# Patient Record
Sex: FEMALE | Race: WHITE | NOT HISPANIC OR LATINO | Employment: FULL TIME | URBAN - METROPOLITAN AREA
[De-identification: names, ages, dates, MRNs, and addresses within clinical notes are randomized per-mention and may not be internally consistent; named-entity substitution may affect disease eponyms.]

---

## 2017-01-30 DIAGNOSIS — I10 ESSENTIAL HYPERTENSION: ICD-10-CM

## 2017-01-30 RX ORDER — HYDROCHLOROTHIAZIDE 25 MG/1
25 TABLET ORAL DAILY
Qty: 90 TABLET | Refills: 1 | Status: SHIPPED | OUTPATIENT
Start: 2017-01-30 | End: 2017-03-06 | Stop reason: SDUPTHER

## 2017-01-30 RX ORDER — RAMIPRIL 10 MG/1
CAPSULE ORAL
Qty: 90 CAPSULE | Refills: 1 | Status: SHIPPED | OUTPATIENT
Start: 2017-01-30 | End: 2017-08-18 | Stop reason: SDUPTHER

## 2017-01-31 RX ORDER — LEVONORGESTREL AND ETHINYL ESTRADIOL 0.15-0.03
KIT ORAL
Qty: 28 TABLET | Refills: 1 | Status: SHIPPED | OUTPATIENT
Start: 2017-01-31 | End: 2018-05-17

## 2017-02-24 DIAGNOSIS — I10 ESSENTIAL HYPERTENSION: ICD-10-CM

## 2017-02-24 RX ORDER — HYDROCHLOROTHIAZIDE 25 MG/1
TABLET ORAL
Qty: 90 TABLET | Refills: 0 | OUTPATIENT
Start: 2017-02-24

## 2017-03-05 DIAGNOSIS — I10 ESSENTIAL HYPERTENSION: ICD-10-CM

## 2017-03-06 ENCOUNTER — OFFICE VISIT (OUTPATIENT)
Dept: INTERNAL MEDICINE | Age: 52
End: 2017-03-06

## 2017-03-06 VITALS
OXYGEN SATURATION: 99 % | TEMPERATURE: 98.7 F | SYSTOLIC BLOOD PRESSURE: 142 MMHG | WEIGHT: 186 LBS | BODY MASS INDEX: 30.99 KG/M2 | DIASTOLIC BLOOD PRESSURE: 90 MMHG | HEIGHT: 65 IN | HEART RATE: 118 BPM

## 2017-03-06 DIAGNOSIS — S96.912A LEFT ANKLE STRAIN, INITIAL ENCOUNTER: Primary | ICD-10-CM

## 2017-03-06 DIAGNOSIS — I10 ESSENTIAL HYPERTENSION: ICD-10-CM

## 2017-03-06 PROCEDURE — 99213 OFFICE O/P EST LOW 20 MIN: CPT | Performed by: NURSE PRACTITIONER

## 2017-03-06 RX ORDER — HYDROCHLOROTHIAZIDE 25 MG/1
TABLET ORAL
Qty: 90 TABLET | Refills: 0 | Status: SHIPPED | OUTPATIENT
Start: 2017-03-06 | End: 2017-07-25 | Stop reason: SDUPTHER

## 2017-03-06 RX ORDER — HYDROCHLOROTHIAZIDE 25 MG/1
25 TABLET ORAL DAILY
Qty: 90 TABLET | Refills: 1 | Status: SHIPPED | OUTPATIENT
Start: 2017-03-06 | End: 2017-07-25 | Stop reason: SDUPTHER

## 2017-03-06 NOTE — PROGRESS NOTES
Niesha Bui / 51 y.o. / female  03/06/2017      CC:  Main reason(s) for today's visit: Joint Swelling (left ankle swelling / sore )      HPI:   Patient presents to the office with complaints of left ankle swelling.  Patient states that she has been exercising a lot more recently and has been walking approximately 5 miles a day.  Patient states she can bear weight on the foot but does have pain in the outer portion near the left ankle.    The following portions of the patient's history were reviewed and updated as appropriate: past medical history and past surgical history.    ASSESSMENT & PLAN:    Problem List Items Addressed This Visit        Cardiovascular and Mediastinum    Essential hypertension    Relevant Medications    ramipril (ALTACE) 10 MG capsule    hydrochlorothiazide (HYDRODIURIL) 25 MG tablet      Other Visit Diagnoses     Left ankle strain, initial encounter    -  Primary        No orders of the defined types were placed in this encounter.      · Summary/Discussion:     1.  Left ankle strain: Patient with C/O pain, swelling and tenderness in the left ankle.  Upon physical inspection patient does have mild swelling at the left foot.  No redness, heat noted.  Patient does not have any pain upon flexion and extension of the left foot.  Patient was advised to continue ibuprofen to help with the inflammation, she was also advised to elevate and ice her left foot after activities.  Patient was instructed that for periods of activity she can wear a soft ankle brace to have additional support.  She was instructed to monitor for worsening of symptoms and if symptoms do not improve to contact the office for further evaluation.    2.  Essential hypertension: Patient with history of essential hypertension, reports to the office requesting medication refill.  I reviewed progress notes from previous visit as well as labs and medication.  Patient medication for HCTZ will be reordered at today's visit.  Follow-up:  "No Follow-up on file.     Future Appointments  Date Time Provider Department Center   3/21/2017 8:00 AM Niranjan Edwards MD [facility] None     ____________________________________________________________________    REVIEW OF SYSTEMS    Review of Systems   Musculoskeletal:        Left ankle pain and swelling.   All other systems reviewed and are negative.    Other: As noted per HPI      VITALS    Visit Vitals   • /90   • Pulse 118   • Temp 98.7 °F (37.1 °C)   • Ht 65\" (165.1 cm)   • Wt 186 lb (84.4 kg)   • SpO2 99%   • BMI 30.95 kg/m2     BP Readings from Last 3 Encounters:   03/06/17 142/90   11/23/16 160/99   10/21/16 140/80     Wt Readings from Last 3 Encounters:   03/06/17 186 lb (84.4 kg)   11/23/16 185 lb 6.4 oz (84.1 kg)   10/21/16 185 lb 12.8 oz (84.3 kg)      Body mass index is 30.95 kg/(m^2).    PHYSICAL EXAMINATION    Physical Exam   Constitutional: She is oriented to person, place, and time. She appears well-developed and well-nourished.   HENT:   Head: Normocephalic.   Eyes: Conjunctivae are normal. Pupils are equal, round, and reactive to light.   Neck: Normal range of motion.   Cardiovascular: Normal rate and regular rhythm.    Pulmonary/Chest: Effort normal and breath sounds normal.   Musculoskeletal: Normal range of motion. She exhibits edema and tenderness (Left ankle. ).   Patient has full, active range of motion.  Patient does have mild swelling on her left foot.  No bruising, redness, heat noted.  Patient did not have any difficulty with flexion and extension of her left foot.  Patient does have mild tenderness upon palpation of the left ankle.   Neurological: She is alert and oriented to person, place, and time.   Skin: Skin is warm and dry.   Psychiatric: She has a normal mood and affect. Her behavior is normal.   Vitals reviewed.      REVIEWED DATA:    Labs:   Lab Results   Component Value Date     08/09/2016    K 4.3 08/09/2016    AST 28 08/09/2016    ALT 21 08/09/2016    BUN 16 " 08/09/2016    CREATININE 0.77 08/09/2016    CREATININE 0.85 04/30/2015    CREATININE 0.74 05/16/2014    EGFRIFNONA 79 08/09/2016    EGFRIFAFRI 96 08/09/2016       Lab Results   Component Value Date    GLU 95 08/09/2016    GLU 92 04/30/2015       Lab Results   Component Value Date    LDL 77 08/09/2016    LDL 92 04/30/2015     (H) 05/16/2014    HDL 88 (H) 08/09/2016    TRIG 107 08/09/2016       No results found for: TSH, FREET4       Lab Results   Component Value Date    WBC 4.5 04/30/2015    HGB 14.5 04/30/2015     04/30/2015        Imaging:        Medical Tests:        Summary of old records / correspondence / consultant report:        Request outside records:          ALLERGIES:    Sulfa antibiotics    MEDICATIONS:  Current Outpatient Prescriptions   Medication Sig Dispense Refill   • hydrochlorothiazide (HYDRODIURIL) 25 MG tablet Take 1 tablet by mouth Daily. 90 tablet 1   • MARLISSA 0.15-30 MG-MCG per tablet TAKE 1 TABLET DAILY 28 tablet 1   • pravastatin (PRAVACHOL) 40 MG tablet Take 1 tablet by mouth Every Night. 90 tablet 1   • ramipril (ALTACE) 10 MG capsule TAKE 1 CAPSULE DAILY 90 capsule 1     No current facility-administered medications for this visit.        [facility]    The following portions of the patient's history were reviewed and updated as appropriate: Allergies / Current Medications / Past Medical History / Surgical History / Social History / Family History    PROBLEM LIST:    Patient Active Problem List   Diagnosis   • Essential hypertension   • Hyperlipidemia   • Obesity (BMI 30-39.9)   • Routine health maintenance       PAST MEDICAL HX:    Past Medical History   Diagnosis Date   • Essential hypertension    • Hyperlipidemia    • UTI (urinary tract infection)        PAST SURGICAL HX:    History reviewed. No pertinent past surgical history.    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

## 2017-05-18 DIAGNOSIS — E78.5 HYPERLIPIDEMIA, UNSPECIFIED HYPERLIPIDEMIA TYPE: ICD-10-CM

## 2017-05-18 RX ORDER — PRAVASTATIN SODIUM 40 MG
TABLET ORAL
Qty: 90 TABLET | Refills: 1 | Status: SHIPPED | OUTPATIENT
Start: 2017-05-18 | End: 2017-11-13 | Stop reason: SDUPTHER

## 2017-07-25 DIAGNOSIS — I10 ESSENTIAL HYPERTENSION: ICD-10-CM

## 2017-07-25 RX ORDER — HYDROCHLOROTHIAZIDE 25 MG/1
25 TABLET ORAL DAILY
Qty: 90 TABLET | Refills: 1 | Status: SHIPPED | OUTPATIENT
Start: 2017-07-25 | End: 2017-11-13 | Stop reason: SDUPTHER

## 2017-08-18 RX ORDER — RAMIPRIL 10 MG/1
CAPSULE ORAL
Qty: 90 CAPSULE | Refills: 1 | Status: SHIPPED | OUTPATIENT
Start: 2017-08-18 | End: 2017-11-13 | Stop reason: SDUPTHER

## 2017-11-13 ENCOUNTER — OFFICE VISIT (OUTPATIENT)
Dept: INTERNAL MEDICINE | Age: 52
End: 2017-11-13

## 2017-11-13 VITALS
DIASTOLIC BLOOD PRESSURE: 80 MMHG | HEART RATE: 120 BPM | BODY MASS INDEX: 28.99 KG/M2 | HEIGHT: 65 IN | TEMPERATURE: 98.5 F | WEIGHT: 174 LBS | SYSTOLIC BLOOD PRESSURE: 132 MMHG | OXYGEN SATURATION: 100 %

## 2017-11-13 DIAGNOSIS — Z00.00 ROUTINE HEALTH MAINTENANCE: Primary | ICD-10-CM

## 2017-11-13 DIAGNOSIS — I10 ESSENTIAL HYPERTENSION: ICD-10-CM

## 2017-11-13 DIAGNOSIS — E66.9 OBESITY (BMI 30-39.9): ICD-10-CM

## 2017-11-13 DIAGNOSIS — E78.5 HYPERLIPIDEMIA, UNSPECIFIED HYPERLIPIDEMIA TYPE: ICD-10-CM

## 2017-11-13 PROCEDURE — 99396 PREV VISIT EST AGE 40-64: CPT | Performed by: INTERNAL MEDICINE

## 2017-11-13 RX ORDER — PRAVASTATIN SODIUM 40 MG
40 TABLET ORAL DAILY
Qty: 90 TABLET | Refills: 3 | Status: SHIPPED | OUTPATIENT
Start: 2017-11-13 | End: 2018-10-18 | Stop reason: SDUPTHER

## 2017-11-13 RX ORDER — PRAVASTATIN SODIUM 40 MG
TABLET ORAL
Qty: 90 TABLET | Refills: 1 | Status: CANCELLED | OUTPATIENT
Start: 2017-11-13

## 2017-11-13 RX ORDER — HYDROCHLOROTHIAZIDE 25 MG/1
25 TABLET ORAL DAILY
Qty: 90 TABLET | Refills: 3 | Status: SHIPPED | OUTPATIENT
Start: 2017-11-13 | End: 2018-02-06 | Stop reason: SDUPTHER

## 2017-11-13 RX ORDER — RAMIPRIL 10 MG/1
10 CAPSULE ORAL DAILY
Qty: 90 CAPSULE | Refills: 3 | Status: SHIPPED | OUTPATIENT
Start: 2017-11-13 | End: 2018-02-06 | Stop reason: SDUPTHER

## 2017-11-13 RX ORDER — DIAPER,BRIEF,INFANT-TODD,DISP
EACH MISCELLANEOUS
COMMUNITY

## 2017-11-13 RX ORDER — FAMOTIDINE 20 MG
TABLET ORAL
COMMUNITY

## 2017-11-13 NOTE — PROGRESS NOTES
"      Niesha Bui / 52 y.o. / female  11/13/2017  VITALS    /80  Pulse 120  Temp 98.5 °F (36.9 °C)  Ht 65\" (165.1 cm)  Wt 174 lb (78.9 kg)  SpO2 100%  Breastfeeding? No  BMI 28.96 kg/m2    BP Readings from Last 3 Encounters:   11/13/17 132/80   03/06/17 142/90   11/23/16 160/99     Wt Readings from Last 3 Encounters:   11/13/17 174 lb (78.9 kg)   03/06/17 186 lb (84.4 kg)   11/23/16 185 lb 6.4 oz (84.1 kg)      Body mass index is 28.96 kg/(m^2).    CC:  Main reason(s) for today's visit: Annual Exam      HPI:     She presents today for annual physical.    Health maintenance: Ophthalmology August 2017, dentist May 2017.  Exercise daily walking and steps at home.  She has lost 11 pounds since last November.  Her goal weight is 155 pounds, I concur.    Patient Care Team:  Niranjan Edwards MD as PCP - General  ____________________________________________________________________    ASSESSMENT & PLAN:    Problem List Items Addressed This Visit        Unprioritized    Essential hypertension    Relevant Medications    ramipril (ALTACE) 10 MG capsule    hydrochlorothiazide (HYDRODIURIL) 25 MG tablet    Hyperlipidemia    Relevant Medications    pravastatin (PRAVACHOL) 40 MG tablet    Obesity (BMI 30-39.9)    Routine health maintenance - Primary    Relevant Orders    CBC & Differential    Comprehensive Metabolic Panel    Lipid Panel    Urinalysis With / Microscopic If Indicated - Urine, Clean Catch    Vitamin D 25 Hydroxy        Orders Placed This Encounter   Procedures   • Comprehensive Metabolic Panel   • Lipid Panel   • Urinalysis With / Microscopic If Indicated - Urine, Clean Catch   • Vitamin D 25 Hydroxy       Summary/Discussion:  · Number-one routine health maintenance, clinically stable female.  See comments above and below.  Lab as above, follow-up results online.  Recommend repeat exam one year.  Refill all meds ×1 year.  ·   · #2 hypertension stable on current medication.  Plan: Continue meds, blood " pressure check 4 months.  ·   · #3 weight control: Patient is actively working on weight loss and is doing good job over the past year.  Encourage her to continue same.  I agree with her goal weight loss target.  Right    Return in about 4 months (around 3/13/2018) for Blood pressure check.    No future appointments.    ______________________________________________________    REVIEW OF SYSTEMS    Review of Systems   Constitutional: Negative.    HENT: Negative.    Eyes: Negative.    Respiratory: Negative.    Cardiovascular: Negative.    Gastrointestinal: Negative.    Endocrine: Negative.    Genitourinary: Negative.    Musculoskeletal: Negative.    Skin: Negative.    Neurological: Negative.    Hematological: Negative.    Psychiatric/Behavioral: Negative.          PHYSICAL EXAMINATION    Physical Exam   Constitutional: She is oriented to person, place, and time. She appears well-developed. No distress.   o/w   HENT:   Head: Normocephalic and atraumatic.   Right Ear: Tympanic membrane, external ear and ear canal normal.   Left Ear: Tympanic membrane, external ear and ear canal normal.   Mouth/Throat: Uvula is midline, oropharynx is clear and moist and mucous membranes are normal.   Eyes: Conjunctivae and EOM are normal. Pupils are equal, round, and reactive to light.   Neck: Normal range of motion. Neck supple. No JVD present. Carotid bruit is not present. No thyromegaly present.   Cardiovascular: Normal rate, regular rhythm, normal heart sounds and intact distal pulses.  Exam reveals no gallop and no friction rub.    No murmur heard.  Pulmonary/Chest: Effort normal and breath sounds normal. She has no wheezes. She has no rales.   Abdominal: Soft. Bowel sounds are normal. There is no hepatosplenomegaly. There is no tenderness.   Genitourinary:   Genitourinary Comments: Patient has follow-up with gynecology schedule for later this month.   Musculoskeletal: Normal range of motion. She exhibits no edema or deformity.    Lymphadenopathy:     She has no cervical adenopathy.   Neurological: She is alert and oriented to person, place, and time. She has normal strength and normal reflexes. No cranial nerve deficit or sensory deficit. Coordination normal.   Skin: Skin is warm and dry. No rash noted.   Psychiatric: She has a normal mood and affect. Her speech is normal and behavior is normal. Judgment and thought content normal. Cognition and memory are normal.   Nursing note and vitals reviewed.      REVIEWED DATA:    Labs:     Lab Results   Component Value Date     08/09/2016    K 4.3 08/09/2016    AST 28 08/09/2016    ALT 21 08/09/2016    BUN 16 08/09/2016    CREATININE 0.77 08/09/2016    CREATININE 0.85 04/30/2015    CREATININE 0.74 05/16/2014    EGFRIFNONA 79 08/09/2016    EGFRIFAFRI 96 08/09/2016       Lab Results   Component Value Date    GLU 95 08/09/2016    GLU 92 04/30/2015       Lab Results   Component Value Date    LDL 77 08/09/2016    LDL 92 04/30/2015     (H) 05/16/2014    HDL 88 (H) 08/09/2016    TRIG 107 08/09/2016       No results found for: TSH, FREET4    Lab Results   Component Value Date    WBC 4.5 04/30/2015    HGB 14.5 04/30/2015     04/30/2015       Imaging:         Medical Tests:         Summary of old records / correspondence / consultant report:         Request outside records:         ALLERGIES  Allergies   Allergen Reactions   • Sulfa Antibiotics         MEDICATIONS  Current Outpatient Prescriptions   Medication Sig Dispense Refill   • aspirin 81 MG tablet Take 81 mg by mouth.     • hydrochlorothiazide (HYDRODIURIL) 25 MG tablet Take 1 tablet by mouth Daily. 90 tablet 3   • MARLISSA 0.15-30 MG-MCG per tablet TAKE 1 TABLET DAILY 28 tablet 1   • Multiple Minerals-Vitamins (CALCIUM CITRATE PLUS/MAGNESIUM) tablet Take  by mouth.     • pravastatin (PRAVACHOL) 40 MG tablet Take 1 tablet by mouth Daily. 90 tablet 3   • ramipril (ALTACE) 10 MG capsule Take 1 capsule by mouth Daily. 90 capsule 3    • Vitamin D, Cholecalciferol, 1000 units capsule Take  by mouth.       No current facility-administered medications for this visit.        PFSH:     The following portions of the patient's history were reviewed and updated as appropriate: Allergies / Current Medications / Past Medical History / Surgical History / Social History / Family History    PROBLEM LIST   Patient Active Problem List   Diagnosis   • Essential hypertension   • Hyperlipidemia   • Obesity (BMI 30-39.9)   • Routine health maintenance       PAST MEDICAL HISTORY  Past Medical History:   Diagnosis Date   • Essential hypertension    • Hyperlipidemia    • UTI (urinary tract infection)        SURGICAL HISTORY  History reviewed. No pertinent surgical history.    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 The electronic translation of spoken language may permit erroneous, or at times, nonsensical words or phrases to be inadvertently transcribed. Although I have reviewed the note for such errors, some may still exist.

## 2017-11-14 PROBLEM — R73.09 ELEVATED GLUCOSE: Status: ACTIVE | Noted: 2017-11-14

## 2017-11-14 LAB
25(OH)D3+25(OH)D2 SERPL-MCNC: 84.6 NG/ML (ref 30–100)
ALBUMIN SERPL-MCNC: 4.4 G/DL (ref 3.5–5.2)
ALBUMIN/GLOB SERPL: 1.5 G/DL
ALP SERPL-CCNC: 51 U/L (ref 39–117)
ALT SERPL-CCNC: 16 U/L (ref 1–33)
APPEARANCE UR: CLEAR
AST SERPL-CCNC: 21 U/L (ref 1–32)
BASOPHILS # BLD AUTO: 0.02 10*3/MM3 (ref 0–0.2)
BASOPHILS NFR BLD AUTO: 0.4 % (ref 0–1.5)
BILIRUB SERPL-MCNC: 0.3 MG/DL (ref 0.1–1.2)
BILIRUB UR QL STRIP: NEGATIVE
BUN SERPL-MCNC: 17 MG/DL (ref 6–20)
BUN/CREAT SERPL: 18.9 (ref 7–25)
CALCIUM SERPL-MCNC: 9.5 MG/DL (ref 8.6–10.5)
CHLORIDE SERPL-SCNC: 101 MMOL/L (ref 98–107)
CHOLEST SERPL-MCNC: 195 MG/DL (ref 0–200)
CO2 SERPL-SCNC: 24.9 MMOL/L (ref 22–29)
COLOR UR: YELLOW
CREAT SERPL-MCNC: 0.9 MG/DL (ref 0.57–1)
EOSINOPHIL # BLD AUTO: 0.11 10*3/MM3 (ref 0–0.7)
EOSINOPHIL NFR BLD AUTO: 2.1 % (ref 0.3–6.2)
ERYTHROCYTE [DISTWIDTH] IN BLOOD BY AUTOMATED COUNT: 14.2 % (ref 11.7–13)
GFR SERPLBLD CREATININE-BSD FMLA CKD-EPI: 66 ML/MIN/1.73
GFR SERPLBLD CREATININE-BSD FMLA CKD-EPI: 80 ML/MIN/1.73
GLOBULIN SER CALC-MCNC: 3 GM/DL
GLUCOSE SERPL-MCNC: 114 MG/DL (ref 65–99)
GLUCOSE UR QL: NEGATIVE
HCT VFR BLD AUTO: 45.9 % (ref 35.6–45.5)
HDLC SERPL-MCNC: 83 MG/DL (ref 40–60)
HGB BLD-MCNC: 14.8 G/DL (ref 11.9–15.5)
HGB UR QL STRIP: NEGATIVE
IMM GRANULOCYTES # BLD: 0 10*3/MM3 (ref 0–0.03)
IMM GRANULOCYTES NFR BLD: 0 % (ref 0–0.5)
KETONES UR QL STRIP: NEGATIVE
LDLC SERPL CALC-MCNC: 93 MG/DL (ref 0–100)
LEUKOCYTE ESTERASE UR QL STRIP: NEGATIVE
LYMPHOCYTES # BLD AUTO: 1.29 10*3/MM3 (ref 0.9–4.8)
LYMPHOCYTES NFR BLD AUTO: 24.7 % (ref 19.6–45.3)
MCH RBC QN AUTO: 32.1 PG (ref 26.9–32)
MCHC RBC AUTO-ENTMCNC: 32.2 G/DL (ref 32.4–36.3)
MCV RBC AUTO: 99.6 FL (ref 80.5–98.2)
MONOCYTES # BLD AUTO: 0.28 10*3/MM3 (ref 0.2–1.2)
MONOCYTES NFR BLD AUTO: 5.4 % (ref 5–12)
NEUTROPHILS # BLD AUTO: 3.53 10*3/MM3 (ref 1.9–8.1)
NEUTROPHILS NFR BLD AUTO: 67.4 % (ref 42.7–76)
NITRITE UR QL STRIP: NEGATIVE
PH UR STRIP: 7 [PH] (ref 5–8)
PLATELET # BLD AUTO: 312 10*3/MM3 (ref 140–500)
POTASSIUM SERPL-SCNC: 4.6 MMOL/L (ref 3.5–5.2)
PROT SERPL-MCNC: 7.4 G/DL (ref 6–8.5)
PROT UR QL STRIP: NEGATIVE
RBC # BLD AUTO: 4.61 10*6/MM3 (ref 3.9–5.2)
SODIUM SERPL-SCNC: 141 MMOL/L (ref 136–145)
SP GR UR: 1.02 (ref 1–1.03)
TRIGL SERPL-MCNC: 95 MG/DL (ref 0–150)
UROBILINOGEN UR STRIP-MCNC: NORMAL MG/DL
VLDLC SERPL CALC-MCNC: 19 MG/DL (ref 5–40)
WBC # BLD AUTO: 5.23 10*3/MM3 (ref 4.5–10.7)

## 2017-11-14 NOTE — PROGRESS NOTES
All laboratory values are acceptable with the exception of glucose 114 mg percent.  For your reference, the cut off for diabetes is a value greater than 126 mg percent on 2 separate occasions.  Weight loss is the treatment of choice for this issue, I recommend that you continue your excellent efforts.  We will recheck this glucose value at your next blood pressure appointment, please come fasting.

## 2018-02-05 ENCOUNTER — TELEPHONE (OUTPATIENT)
Dept: OBSTETRICS AND GYNECOLOGY | Facility: CLINIC | Age: 53
End: 2018-02-05

## 2018-02-05 NOTE — TELEPHONE ENCOUNTER
Dakota.    Let her know that we refilled her birth control pills but she will need to be seen to get more.  Please schedule.    Thanks    Jesse

## 2018-02-06 DIAGNOSIS — I10 ESSENTIAL HYPERTENSION: ICD-10-CM

## 2018-02-06 RX ORDER — RAMIPRIL 10 MG/1
10 CAPSULE ORAL DAILY
Qty: 90 CAPSULE | Refills: 3 | Status: SHIPPED | OUTPATIENT
Start: 2018-02-06 | End: 2018-03-13 | Stop reason: SDUPTHER

## 2018-02-06 RX ORDER — HYDROCHLOROTHIAZIDE 25 MG/1
25 TABLET ORAL DAILY
Qty: 90 TABLET | Refills: 3 | Status: SHIPPED | OUTPATIENT
Start: 2018-02-06 | End: 2018-12-27 | Stop reason: SDUPTHER

## 2018-03-13 ENCOUNTER — OFFICE VISIT (OUTPATIENT)
Dept: INTERNAL MEDICINE | Age: 53
End: 2018-03-13

## 2018-03-13 VITALS
DIASTOLIC BLOOD PRESSURE: 104 MMHG | TEMPERATURE: 97.9 F | SYSTOLIC BLOOD PRESSURE: 162 MMHG | WEIGHT: 177 LBS | OXYGEN SATURATION: 100 % | HEART RATE: 102 BPM | HEIGHT: 65 IN | BODY MASS INDEX: 29.49 KG/M2

## 2018-03-13 DIAGNOSIS — E78.5 HYPERLIPIDEMIA, UNSPECIFIED HYPERLIPIDEMIA TYPE: ICD-10-CM

## 2018-03-13 DIAGNOSIS — I10 ESSENTIAL HYPERTENSION: Primary | ICD-10-CM

## 2018-03-13 DIAGNOSIS — R73.09 ELEVATED GLUCOSE: ICD-10-CM

## 2018-03-13 DIAGNOSIS — E66.9 OBESITY (BMI 30-39.9): ICD-10-CM

## 2018-03-13 LAB — GLUCOSE SERPL-MCNC: 103 MG/DL (ref 65–99)

## 2018-03-13 PROCEDURE — 99214 OFFICE O/P EST MOD 30 MIN: CPT | Performed by: INTERNAL MEDICINE

## 2018-03-13 RX ORDER — RAMIPRIL 10 MG/1
20 CAPSULE ORAL DAILY
Qty: 180 CAPSULE | Refills: 3 | Status: SHIPPED | OUTPATIENT
Start: 2018-03-13 | End: 2018-12-26 | Stop reason: SDUPTHER

## 2018-03-13 RX ORDER — RAMIPRIL 10 MG/1
20 CAPSULE ORAL DAILY
Qty: 90 CAPSULE | Refills: 3
Start: 2018-03-13 | End: 2018-03-13 | Stop reason: SDUPTHER

## 2018-03-13 NOTE — PROGRESS NOTES
"    Niesha Bui / 52 y.o. / female  03/13/2018  VITALS    Visit Vitals  BP (!) 162/104   Pulse 102   Temp 97.9 °F (36.6 °C)   Ht 165.1 cm (65\")   Wt 80.3 kg (177 lb)   SpO2 100%   BMI 29.45 kg/m²       BP Readings from Last 3 Encounters:   03/13/18 (!) 162/104   11/13/17 132/80   03/06/17 142/90     Wt Readings from Last 3 Encounters:   03/13/18 80.3 kg (177 lb)   11/13/17 78.9 kg (174 lb)   03/06/17 84.4 kg (186 lb)      Body mass index is 29.45 kg/m².    CC:  Main reason(s) for today's visit: Hypertension      HPI:     Day for follow-up of hypertension.  When last seen by me, blood pressure is well-controlled 132/80.  Today she is compliant with medication, dietary salt restriction, walking 2 miles for exercise on a daily basis blood pressure at home in the 130s over 80s.    Weight control: Patient has gained approximately 3 pounds since our last visit but she is still down 9 pounds from last year at this time.    Hyperlipidemia on medication, we'll control see above for details.        Return review November 2017, CMP lipid, vitamin D, CBC normal except for glucose of 114..  See below for specific values.    Patient Care Team:  Niranjan Edwards MD as PCP - General  ____________________________________________________________________    ASSESSMENT & PLAN:    Problem List Items Addressed This Visit        Unprioritized    Essential hypertension - Primary    Relevant Medications    hydrochlorothiazide (HYDRODIURIL) 25 MG tablet    ramipril (ALTACE) 10 MG capsule    Hyperlipidemia    Relevant Medications    pravastatin (PRAVACHOL) 40 MG tablet    Obesity (BMI 30-39.9)      Other Visit Diagnoses    None.       No orders of the defined types were placed in this encounter.      Summary/Discussion:  · Number-one hypertension needs improved control.  Increase ramipril 20 mg per day, continue hydrochlorothiazide, blood pressure check in 6 weeks.  Patient is advised that if she becomes lightheaded or dizzy discontinue the " hydrochlorothiazide and let me know.  Refill her medication ×1 year.  ·   · #2 weight control, encouraged patient continue her walking efforts.  ·   · #3 cholesterol well-controlled on medication, continue same.  ·   · #4 history of elevated glucose, as no family history diabetes.  Recheck fasting blood sugar today, patient advised that weight loss would be helpful to control glucose.    No Follow-up on file.    Future Appointments  Date Time Provider Department Center   3/15/2018 2:40 PM MAMMOGRAM CATHERINE OSMAN [facility] None   3/15/2018 3:15 PM Kesha Ellison MD [facility] None       ______________________________________________________    REVIEW OF SYSTEMS    Review of Systems   Respiratory: Negative for chest tightness and shortness of breath.    Cardiovascular: Negative for chest pain and palpitations.   Neurological: Negative for dizziness, syncope, speech difficulty, weakness, light-headedness and headaches.         PHYSICAL EXAMINATION    Physical Exam   Constitutional: She is oriented to person, place, and time. She appears well-developed and well-nourished. No distress.   Cardiovascular: Normal rate, regular rhythm, normal heart sounds and intact distal pulses.  Exam reveals no gallop and no friction rub.    No murmur heard.  Pulmonary/Chest: Effort normal and breath sounds normal. She has no wheezes. She has no rales.   Musculoskeletal: She exhibits no edema.   Neurological: She is alert and oriented to person, place, and time.   Skin: Skin is warm and dry. No rash noted.   Psychiatric: She has a normal mood and affect. Her behavior is normal. Judgment and thought content normal.   Nursing note and vitals reviewed.      REVIEWED DATA:    Labs:     Lab Results   Component Value Date     11/13/2017    K 4.6 11/13/2017    AST 21 11/13/2017    ALT 16 11/13/2017    BUN 17 11/13/2017    CREATININE 0.90 11/13/2017    CREATININE 0.77 08/09/2016    CREATININE 0.85 04/30/2015    EGFRIFNONA 66 11/13/2017     EGFRIFAFRI 80 11/13/2017       Lab Results   Component Value Date    GLUCOSE 98 05/16/2014       Lab Results   Component Value Date    LDL 93 11/13/2017    LDL 77 08/09/2016    LDL 92 04/30/2015    HDL 83 (H) 11/13/2017    TRIG 95 11/13/2017       No results found for: TSH, FREET4    Lab Results   Component Value Date    WBC 5.23 11/13/2017    HGB 14.8 11/13/2017    HGB 14.5 04/30/2015     11/13/2017       Imaging:         Medical Tests:         Summary of old records / correspondence / consultant report:         Request outside records:         ALLERGIES  Allergies   Allergen Reactions   • Sulfa Antibiotics         MEDICATIONS  Current Outpatient Prescriptions   Medication Sig Dispense Refill   • aspirin 81 MG tablet Take 81 mg by mouth.     • hydrochlorothiazide (HYDRODIURIL) 25 MG tablet Take 1 tablet by mouth Daily. 90 tablet 3   • MARLISSA 0.15-30 MG-MCG per tablet TAKE 1 TABLET DAILY 28 tablet 1   • Multiple Minerals-Vitamins (CALCIUM CITRATE PLUS/MAGNESIUM) tablet Take  by mouth.     • pravastatin (PRAVACHOL) 40 MG tablet Take 1 tablet by mouth Daily. 90 tablet 3   • ramipril (ALTACE) 10 MG capsule Take 1 capsule by mouth Daily. 90 capsule 3   • Vitamin D, Cholecalciferol, 1000 units capsule Take  by mouth.       No current facility-administered medications for this visit.        PFSH:     The following portions of the patient's history were reviewed and updated as appropriate: Allergies / Current Medications / Past Medical History / Surgical History / Social History / Family History    PROBLEM LIST   Patient Active Problem List   Diagnosis   • Essential hypertension   • Hyperlipidemia   • Obesity (BMI 30-39.9)   • Routine health maintenance   • Elevated glucose       PAST MEDICAL HISTORY  Past Medical History:   Diagnosis Date   • Adiposity    • Allergic Sulfa   • Essential hypertension    • Hyperlipidemia    • Obesity (BMI 30-39.9)    • UTI (urinary tract infection)        SURGICAL HISTORY  Past  Surgical History:   Procedure Laterality Date   • COLONOSCOPY  2017    Good   • TONSILLECTOMY  1972       SOCIAL HISTORY  Social History     Social History   • Marital status:    • Number of children: 0     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[employer]      Social History Main Topics   • Smoking status: Never Smoker   • Smokeless tobacco: Never Used   • Alcohol use Yes     2 - 4 Glasses of wine per week      Comment: Occasional social drinks   • Drug use: No   • Sexual activity: Yes     Partners: Male     Birth control/ protection: OCP     Other Topics Concern   • Not on file     Social History Narrative     March 2016       FAMILY HISTORY  Family History   Problem Relation Age of Onset   • Coronary artery disease Other      MI   • Early death Sister    • Hypertension Mother          **Dragon Disclaimer:   Much of this encounter note is an electronic transcription/translation of spoken language to printed text. The electronic translation of spoken language may permit erroneous, or at times, nonsensical words or phrases to be inadvertently transcribed. Although I have reviewed the note for such errors, some may still exist.

## 2018-03-15 ENCOUNTER — PROCEDURE VISIT (OUTPATIENT)
Dept: OBSTETRICS AND GYNECOLOGY | Facility: CLINIC | Age: 53
End: 2018-03-15

## 2018-03-15 ENCOUNTER — OFFICE VISIT (OUTPATIENT)
Dept: OBSTETRICS AND GYNECOLOGY | Facility: CLINIC | Age: 53
End: 2018-03-15

## 2018-03-15 ENCOUNTER — APPOINTMENT (OUTPATIENT)
Dept: WOMENS IMAGING | Facility: HOSPITAL | Age: 53
End: 2018-03-15

## 2018-03-15 VITALS
HEART RATE: 106 BPM | BODY MASS INDEX: 29.32 KG/M2 | HEIGHT: 65 IN | DIASTOLIC BLOOD PRESSURE: 104 MMHG | WEIGHT: 176 LBS | SYSTOLIC BLOOD PRESSURE: 146 MMHG

## 2018-03-15 DIAGNOSIS — Z12.31 VISIT FOR SCREENING MAMMOGRAM: Primary | ICD-10-CM

## 2018-03-15 DIAGNOSIS — Z01.419 WELL WOMAN EXAM WITH ROUTINE GYNECOLOGICAL EXAM: Primary | ICD-10-CM

## 2018-03-15 PROCEDURE — 77067 SCR MAMMO BI INCL CAD: CPT | Performed by: OBSTETRICS & GYNECOLOGY

## 2018-03-15 PROCEDURE — 77067 SCR MAMMO BI INCL CAD: CPT | Performed by: RADIOLOGY

## 2018-03-15 PROCEDURE — 99396 PREV VISIT EST AGE 40-64: CPT | Performed by: OBSTETRICS & GYNECOLOGY

## 2018-03-15 NOTE — PROGRESS NOTES
Subjective   Niesha Bui is a 52 y.o. female   Chief Complaint   Patient presents with   • Annual Exam     17 last mammo 3/15/18     History of Present Illness    Niesha Bui is a 52 y.o.  who presents for an annual examination     Pap history:  Last pap: 16  Prior abnormal paps: yes, years and years ago  STDs  Sexually active: yes - newly   History of STDs: no  Contraception:  OCPs, is willing to come off and see how menses go    History of physical abuse: no, History of sexual abuse: no and History of verbal/emotional abuse: no    Screening for BRCA-   Is patient's family history significant for any of the following?   no  For non-Ashkenazi [employer]ae[employer] women:   Two first-degree relatives with breast cancer, one of whom was diagnosed at age 50 or younger   A combination of three or more first or second-degree relatives with breast cancer regardless of age at diagnosis   A combination of both breast and ovarian cancer among first and second-degree relatives   A first-degree relative with bilateral breast cancer   A combination of two or more first or second degree relatives with ovarian cancer, regardless of age at diagnosis   A first or second-degree relative with both breast and ovarian cancer at any age   History of breast cancer in a male relative   For women of Ashkenazi [employer]ae[employer] descent:   Any first-degree relative (or two second degree relatives on the same side of the family) with breast or ovarian cancer   Recommendations from the [place] Preventive Services Task Force on who should be offered genetic testing for BRCA mutations*     Past Medical History:   Diagnosis Date   • Adiposity    • Allergic Sulfa   • Essential hypertension    • Hyperlipidemia    • Obesity (BMI 30-39.9)    • UTI (urinary tract infection)      Past Surgical History:   Procedure Laterality Date   • COLONOSCOPY  2017    Good   • TONSILLECTOMY  1972     Social History   Substance Use Topics   • Smoking  status: Never Smoker   • Smokeless tobacco: Never Used   • Alcohol use Yes     2 - 4 Glasses of wine per week      Comment: Occasional social drinks     Family History   Problem Relation Age of Onset   • Coronary artery disease Other      MI   • Early death Sister    • Hypertension Mother    • Breast cancer Neg Hx    • Ovarian cancer Neg Hx    • Uterine cancer Neg Hx    • Colon cancer Neg Hx      Current Outpatient Prescriptions on File Prior to Visit   Medication Sig Dispense Refill   • aspirin 81 MG tablet Take 81 mg by mouth.     • hydrochlorothiazide (HYDRODIURIL) 25 MG tablet Take 1 tablet by mouth Daily. 90 tablet 3   • MARLISSA 0.15-30 MG-MCG per tablet TAKE 1 TABLET DAILY 28 tablet 1   • Multiple Minerals-Vitamins (CALCIUM CITRATE PLUS/MAGNESIUM) tablet Take  by mouth.     • pravastatin (PRAVACHOL) 40 MG tablet Take 1 tablet by mouth Daily. 90 tablet 3   • ramipril (ALTACE) 10 MG capsule Take 2 capsules by mouth Daily. 180 capsule 3   • Vitamin D, Cholecalciferol, 1000 units capsule Take  by mouth.       No current facility-administered medications on file prior to visit.      Allergies   Allergen Reactions   • Sulfa Antibiotics Hives        Review of Systems   Constitutional: Negative for chills, fever and unexpected weight change.   HENT: Negative for congestion, nosebleeds and sore throat.    Eyes: Negative for visual disturbance.   Respiratory: Negative for cough, chest tightness and shortness of breath.    Cardiovascular: Negative for chest pain and palpitations.   Gastrointestinal: Negative for abdominal pain, constipation, diarrhea, nausea and vomiting.   Endocrine: Negative for polyuria.   Genitourinary: Negative for difficulty urinating, dyspareunia, dysuria, frequency, genital sores, hematuria, menstrual problem, pelvic pain, urgency, vaginal bleeding, vaginal discharge and vaginal pain.   Musculoskeletal: Negative for arthralgias and joint swelling.   Skin: Negative for color change and rash.  "  Neurological: Negative for dizziness, light-headedness and headaches.   Hematological: Does not bruise/bleed easily.   Psychiatric/Behavioral: Negative for dysphoric mood. The patient is not nervous/anxious.        Objective    BP (!) 146/104   Pulse 106   Ht 165.1 cm (65\")   Wt 79.8 kg (176 lb)   LMP 03/07/2018 (Exact Date)   BMI 29.29 kg/m²    Physical Exam   Constitutional: She is oriented to person, place, and time. She appears well-developed and well-nourished. No distress.   HENT:   Head: Normocephalic and atraumatic.   Neck: No tracheal deviation present. No thyromegaly present.   Cardiovascular: Normal rate, regular rhythm and normal heart sounds.  Exam reveals no gallop and no friction rub.    No murmur heard.  Pulmonary/Chest: Effort normal and breath sounds normal. No respiratory distress. She has no wheezes. She has no rales. She exhibits no tenderness. Right breast exhibits no inverted nipple, no mass, no nipple discharge, no skin change and no tenderness. Left breast exhibits no inverted nipple, no mass, no nipple discharge, no skin change and no tenderness.   Abdominal: Soft. She exhibits no distension and no mass. There is no tenderness.   Genitourinary: Uterus normal. No labial fusion. There is no rash, lesion or injury on the right labia. There is no rash, lesion or injury on the left labia. Uterus is not deviated, not enlarged, not fixed and not tender. Cervix exhibits no motion tenderness, no discharge and no friability. Right adnexum displays no mass, no tenderness and no fullness. Left adnexum displays no mass, no tenderness and no fullness. No tenderness or bleeding in the vagina. No vaginal discharge found.   Musculoskeletal: Normal range of motion. She exhibits no edema or deformity.   Lymphadenopathy:     She has no cervical adenopathy.   Neurological: She is alert and oriented to person, place, and time.   Skin: Skin is warm and dry. No rash noted. She is not diaphoretic. "   Psychiatric: She has a normal mood and affect. Her behavior is normal. Judgment and thought content normal.         Assessment/Plan   There are no diagnoses linked to this encounter.    Well woman counseling/screening:    Cervical cancer screening:    Reports remote h/o cervical dysplasia  The patient is due for a pap in 2021.    Screening guidelines discussed with patient  Breast cancer screening:    Clinical breast exam recommended for age 20-39 years every 1-3 years   Mammogram recommended starting age 40, done today   Breast self awareness encouraged  STD Screening   Declined  Contraception :   Was on oral contraceptive pill but willing to stop to see how it goes.  Will call or come back if symptoms arise  Family history    does not demonstrate need for genetics referral   Healthy lifestyle counseling:   Body mass index is 29.29 kg/m².   Colonoscopy   2017    Hypertension:  Patient has no alarm symptoms and gets white coat hypertension and anxiety. Is monitoring BP at home and it is mostly normal (130's systolic, normal diastolic this AM)    BMI counseling  Her BMI is classified as BMI 25.0-29.9        Classification: overweight  Is Niesha Bui ready to make a healthy lifestyle change today? yes  Patient has tried walking, decreasing carbs  Today, the course of action is: goal weight 155lb

## 2018-05-17 ENCOUNTER — OFFICE VISIT (OUTPATIENT)
Dept: INTERNAL MEDICINE | Age: 53
End: 2018-05-17

## 2018-05-17 VITALS
HEART RATE: 103 BPM | TEMPERATURE: 98.4 F | SYSTOLIC BLOOD PRESSURE: 144 MMHG | HEIGHT: 65 IN | BODY MASS INDEX: 30.35 KG/M2 | WEIGHT: 182.2 LBS | OXYGEN SATURATION: 99 % | DIASTOLIC BLOOD PRESSURE: 80 MMHG

## 2018-05-17 DIAGNOSIS — I10 ESSENTIAL HYPERTENSION: Primary | ICD-10-CM

## 2018-05-17 DIAGNOSIS — E66.9 OBESITY (BMI 30-39.9): ICD-10-CM

## 2018-05-17 PROCEDURE — 99213 OFFICE O/P EST LOW 20 MIN: CPT | Performed by: INTERNAL MEDICINE

## 2018-05-17 NOTE — PROGRESS NOTES
"[facility]        Niesha Bui / 52 y.o. / female  05/17/2018      ASSESSMENT & PLAN:    Problem List Items Addressed This Visit        Unprioritized    Essential hypertension - Primary    Relevant Medications    hydrochlorothiazide (HYDRODIURIL) 25 MG tablet    ramipril (ALTACE) 10 MG capsule    Obesity (BMI 30-39.9)        No orders of the defined types were placed in this encounter.      Summary/Discussion:    Number-one hypertension improved control both here in the office as well as at home.  Recommend continue present medication, and blood pressure check in 4 months.    #2 weight control, patient is exercising currently.  Continue same.  Goal weight is 155 pounds.          Return in about 4 months (around 9/17/2018) for Blood pressure check.    ____________________________________________________________________    VITALS    Visit Vitals  /80   Pulse 103   Temp 98.4 °F (36.9 °C)   Ht 165.1 cm (65\")   Wt 82.6 kg (182 lb 3.2 oz)   SpO2 99%   BMI 30.32 kg/m²       BP Readings from Last 3 Encounters:   05/17/18 144/80   03/15/18 (!) 146/104   03/13/18 (!) 162/104     Wt Readings from Last 3 Encounters:   05/17/18 82.6 kg (182 lb 3.2 oz)   03/15/18 79.8 kg (176 lb)   03/13/18 80.3 kg (177 lb)      Body mass index is 30.32 kg/m².    CC:  Main reason(s) for today's visit: Hypertension      HPI:     Patient presents this afternoon for follow-up of hypertension.  When last seen by me in March on the 13th, Avapro was increased to 20 mg per day.  For the combination caused some dysphoric sensations, patient's split the medication taking hydrochlorothiazide the morning, and ramipril and evening.  She has been exercising, and monitoring blood pressure at home.  Pressure has ranged from a low of 126/90 to a high of 135/80.  With splitting of the medication the adverse symptoms have resolved.  She is exercising 5 days a week at this time.    Laboratory review November 2017 CMP normal, except for glucose " of 114 this was repeated in March 2018 and that value was 103 mg percent lipids were normal in November as was the CBC.  Annual physical was done during November 2017.    Patient Care Team:  Niranjan Edwards MD as PCP - General  ____________________________________________________________________    REVIEW OF SYSTEMS    Review of Systems   Respiratory: Negative for chest tightness and shortness of breath.    Cardiovascular: Negative for chest pain and palpitations.   Musculoskeletal: Negative for neck pain.   Neurological: Negative for dizziness, syncope, speech difficulty, weakness, light-headedness and headaches.         PHYSICAL EXAMINATION    Physical Exam   Constitutional: She is oriented to person, place, and time. She appears well-developed. No distress.   O/w     Cardiovascular: Normal rate, regular rhythm, normal heart sounds and intact distal pulses.  Exam reveals no gallop and no friction rub.    No murmur heard.  Pulmonary/Chest: Effort normal and breath sounds normal. She has no wheezes. She has no rales.   Musculoskeletal: She exhibits no edema.   Neurological: She is alert and oriented to person, place, and time.   Skin: Skin is warm and dry. No rash noted.   Psychiatric: She has a normal mood and affect. Her behavior is normal. Judgment and thought content normal.   Nursing note and vitals reviewed.      REVIEWED DATA:    Labs:     Lab Results   Component Value Date     11/13/2017    K 4.6 11/13/2017    AST 21 11/13/2017    ALT 16 11/13/2017    BUN 17 11/13/2017    CREATININE 0.90 11/13/2017    CREATININE 0.77 08/09/2016    CREATININE 0.85 04/30/2015    EGFRIFNONA 66 11/13/2017    EGFRIFAFRI 80 11/13/2017       Lab Results   Component Value Date    GLUCOSE 98 05/16/2014       Lab Results   Component Value Date    LDL 93 11/13/2017    LDL 77 08/09/2016    LDL 92 04/30/2015    HDL 83 (H) 11/13/2017    TRIG 95 11/13/2017       No results found for: TSH, FREET4    Lab Results   Component Value Date     WBC 5.23 11/13/2017    HGB 14.8 11/13/2017    HGB 14.5 04/30/2015     11/13/2017       Imaging:         Medical Tests:         Summary of old records / correspondence / consultant report:         Request outside records:         ALLERGIES  Allergies   Allergen Reactions   • Sulfa Antibiotics Hives        MEDICATIONS  Current Outpatient Prescriptions   Medication Sig Dispense Refill   • aspirin 81 MG tablet Take 81 mg by mouth.     • hydrochlorothiazide (HYDRODIURIL) 25 MG tablet Take 1 tablet by mouth Daily. 90 tablet 3   • Multiple Minerals-Vitamins (CALCIUM CITRATE PLUS/MAGNESIUM) tablet Take  by mouth.     • pravastatin (PRAVACHOL) 40 MG tablet Take 1 tablet by mouth Daily. 90 tablet 3   • ramipril (ALTACE) 10 MG capsule Take 2 capsules by mouth Daily. 180 capsule 3   • Vitamin D, Cholecalciferol, 1000 units capsule Take  by mouth.       No current facility-administered medications for this visit.        PFSH:     The following portions of the patient's history were reviewed and updated as appropriate: Allergies / Current Medications / Past Medical History / Surgical History / Social History / Family History    PROBLEM LIST   Patient Active Problem List   Diagnosis   • Essential hypertension   • Hyperlipidemia   • Obesity (BMI 30-39.9)   • Routine health maintenance   • Elevated glucose       PAST MEDICAL HISTORY  Past Medical History:   Diagnosis Date   • Adiposity    • Allergic Sulfa   • Essential hypertension    • Hyperlipidemia    • Obesity (BMI 30-39.9)    • UTI (urinary tract infection)        SURGICAL HISTORY  Past Surgical History:   Procedure Laterality Date   • COLONOSCOPY  2017    Good   • TONSILLECTOMY  1972       SOCIAL HISTORY  Social History     Social History   • Marital status:    • Number of children: 0     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[employer]      Social History Main Topics   • Smoking status: Never Smoker   • Smokeless tobacco: Never Used   • Alcohol use Yes     2 - 4  Glasses of wine per week      Comment: Occasional social drinks   • Drug use: No   • Sexual activity: Yes     Partners: Male     Birth control/ protection: OCP     Other Topics Concern   • Not on file     Social History Narrative     March 2016       FAMILY HISTORY  Family History   Problem Relation Age of Onset   • Coronary artery disease Other         MI   • Early death Sister    • Hypertension Mother    • Breast cancer Neg Hx    • Ovarian cancer Neg Hx    • Uterine cancer Neg Hx    • Colon cancer Neg Hx          **Dragon Disclaimer:   Much of this encounter note is an electronic transcription/translation of spoken language to printed text. The electronic translation of spoken language may permit erroneous, or at times, nonsensical words or phrases to be inadvertently transcribed. Although I have reviewed the note for such errors, some may still exist.   Answers for HPI/ROS submitted by the patient on 5/10/2018   Hypertension  Chronicity: recurrent  Onset: more than 1 year ago  Progression since onset: waxing and waning  Condition status: controlled  anxiety: Yes  blurred vision: No  malaise/fatigue: No  orthopnea: No  peripheral edema: No  PND: No  sweats: Yes  CAD risks: family history  Compliance problems: no compliance problems

## 2018-06-25 ENCOUNTER — OFFICE VISIT (OUTPATIENT)
Dept: OBSTETRICS AND GYNECOLOGY | Facility: CLINIC | Age: 53
End: 2018-06-25

## 2018-06-25 ENCOUNTER — TELEPHONE (OUTPATIENT)
Dept: OBSTETRICS AND GYNECOLOGY | Facility: CLINIC | Age: 53
End: 2018-06-25

## 2018-06-25 VITALS
SYSTOLIC BLOOD PRESSURE: 155 MMHG | HEIGHT: 65 IN | WEIGHT: 183 LBS | DIASTOLIC BLOOD PRESSURE: 112 MMHG | BODY MASS INDEX: 30.49 KG/M2 | HEART RATE: 101 BPM

## 2018-06-25 DIAGNOSIS — N95.1 VASOMOTOR SYMPTOMS DUE TO MENOPAUSE: Primary | ICD-10-CM

## 2018-06-25 PROCEDURE — 99213 OFFICE O/P EST LOW 20 MIN: CPT | Performed by: OBSTETRICS & GYNECOLOGY

## 2018-06-25 RX ORDER — ESCITALOPRAM OXALATE 10 MG/1
10 TABLET ORAL DAILY
Qty: 30 TABLET | Refills: 5 | Status: SHIPPED | OUTPATIENT
Start: 2018-06-25 | End: 2018-06-25 | Stop reason: SDUPTHER

## 2018-06-25 RX ORDER — ESCITALOPRAM OXALATE 10 MG/1
10 TABLET ORAL DAILY
Qty: 30 TABLET | Refills: 5 | Status: SHIPPED | OUTPATIENT
Start: 2018-06-25 | End: 2019-01-16 | Stop reason: SDUPTHER

## 2018-06-25 NOTE — PROGRESS NOTES
Subjective   Niesha Bui is a 52 y.o. female   Chief Complaint   Patient presents with   • hormones     patient reports hot flashes and she has not had a period since she stopped her birth control (3 mo ago)      History of Present Illness  Patient comes in to discuss menopausal symptoms.  She stopped her oral contraceptive pill at her annual exam in March.  Since that time she had a little light spotting right after stopping the pill.  She's had no bleeding since.  She reports worsening hot flashes and mood swings.  She reports anxiety.  She denies a decrease in sex drive or vaginal dryness.  She is controlled on antihypertensive but reports severe white coat hypertension.  She tried black cohash briefly but stopped when she saw the potential side effects of the medication.    Past Medical History:   Diagnosis Date   • Adiposity    • Allergic Sulfa   • Essential hypertension    • Hyperlipidemia    • Obesity (BMI 30-39.9)    • UTI (urinary tract infection)      Past Surgical History:   Procedure Laterality Date   • COLONOSCOPY  2017    Good   • TONSILLECTOMY  1972     Social History   Substance Use Topics   • Smoking status: Never Smoker   • Smokeless tobacco: Never Used   • Alcohol use Yes     2 - 4 Glasses of wine per week      Comment: Occasional social drinks     Family History   Problem Relation Age of Onset   • Coronary artery disease Other         MI   • Early death Sister    • Hypertension Mother    • Breast cancer Neg Hx    • Ovarian cancer Neg Hx    • Uterine cancer Neg Hx    • Colon cancer Neg Hx          Review of Systems   Constitutional: Negative for chills and fever.   Respiratory: Negative for shortness of breath.    Cardiovascular: Negative for chest pain.   Gastrointestinal: Negative for abdominal pain.   Endocrine: Positive for heat intolerance.   Genitourinary: Negative for difficulty urinating, dyspareunia, pelvic pain, vaginal bleeding and vaginal pain.   Musculoskeletal: Negative for  "myalgias.   Neurological: Negative for dizziness and light-headedness.   Psychiatric/Behavioral: Positive for sleep disturbance.       Objective    BP (!) 155/112   Pulse 101   Ht 165.1 cm (65\")   Wt 83 kg (183 lb)   BMI 30.45 kg/m²    Physical Exam   Constitutional: She is oriented to person, place, and time. She appears well-developed and well-nourished. No distress.   HENT:   Head: Normocephalic and atraumatic.   Eyes: EOM are normal. No scleral icterus.   Pulmonary/Chest: Effort normal and breath sounds normal.   Abdominal: There is no tenderness.   Neurological: She is alert and oriented to person, place, and time.   Skin: Skin is warm and dry. She is not diaphoretic.   Psychiatric: She has a normal mood and affect. Her behavior is normal. Judgment and thought content normal.         Assessment/Plan   Problems Addressed this Visit     None      Visit Diagnoses     Vasomotor symptoms due to menopause    -  Primary        Patient was counseled on the risks and benefits of HRT  Specifically, we reviewed the FDA indications for HRT in postmenopausal women for the treatment of vasomotor symptoms.  The appropriate, often lowest, effective dose of systemic ET consistent with treatment goals that provides benefits and minimizes risks for the individual woman should be the therapeutic goal. In women who have a uterus, they should take progestin to prevent development of endometrial hyperplasia/malignancy. She is understanding that HRT can be associated with some risks including but not limited to nausea, bloating, mood swings, bleeding, headaches, breast tenderness as well as more serious side effects such as increased risk of CVD, stroke, hypertension and rare risk of breast cancer.  Relative contraindications do exist for hormone therapy and include unexplained vaginal bleeding, severe active liver disease, prior estrogen-sensitive breast or endometrial cancer, coronary heart disease (CHD), stroke, dementia, " personal history or inherited high risk of thromboembolic disease, porphyria cutanea tarda, and hypertriglyceridemia.  There are also concerns that in women with a history of endometriosis, HT could result in a reactivation.  Also, migraine headaches may worsen, or leiomyomas may grow.  We reviewed nonhormonal alternatives to treat vasomotor symptoms.  Given her risk factors including hypertension and hyperlipidemia, patient would like to try and nonhormonal treatment for her vasomotor symptoms.  Given her anxiety, we discussed the options of Paxil, Effexor, Lexapro.  She would like to start with a low dose of Lexapro.  Explained to her that this medication can take 4-6 weeks to reach maximum affect.  We'll start with the lowest dose possible.  If she experiences any side effects on starting the medication including worsening depression or anxiety she should stop the medication and contact us immediately or seek immediate medical attention if thoughts of SI/HI arise.   All questions were answered to apparent satisfaction.  Return in about 6 months (around 12/25/2018).

## 2018-06-25 NOTE — TELEPHONE ENCOUNTER
Pt requested that her Rx be sent over to the [facility] pharmacy in her chart instead of the [facility] mail service pharmacy. Can this be resent?     Call back # [phone]    Sent to local [facility]

## 2018-08-30 ENCOUNTER — TELEPHONE (OUTPATIENT)
Dept: INTERNAL MEDICINE | Age: 53
End: 2018-08-30

## 2018-08-30 NOTE — TELEPHONE ENCOUNTER
Patient herd on the news that Hydrocholothyizide was recalled. She wanted to know if this affects her and if so, what does he want to change the medication to?

## 2018-10-18 DIAGNOSIS — E78.5 HYPERLIPIDEMIA, UNSPECIFIED HYPERLIPIDEMIA TYPE: ICD-10-CM

## 2018-10-18 RX ORDER — PRAVASTATIN SODIUM 40 MG
TABLET ORAL
Qty: 30 TABLET | Refills: 0 | Status: SHIPPED | OUTPATIENT
Start: 2018-10-18 | End: 2018-11-17 | Stop reason: SDUPTHER

## 2018-11-17 DIAGNOSIS — E78.5 HYPERLIPIDEMIA, UNSPECIFIED HYPERLIPIDEMIA TYPE: ICD-10-CM

## 2018-11-19 RX ORDER — PRAVASTATIN SODIUM 40 MG
TABLET ORAL
Qty: 15 TABLET | Refills: 0 | Status: SHIPPED | OUTPATIENT
Start: 2018-11-19 | End: 2018-12-05 | Stop reason: SDUPTHER

## 2018-12-05 DIAGNOSIS — E78.5 HYPERLIPIDEMIA, UNSPECIFIED HYPERLIPIDEMIA TYPE: ICD-10-CM

## 2018-12-05 RX ORDER — PRAVASTATIN SODIUM 40 MG
TABLET ORAL
Qty: 15 TABLET | Refills: 0 | Status: SHIPPED | OUTPATIENT
Start: 2018-12-05 | End: 2018-12-27 | Stop reason: SDUPTHER

## 2018-12-23 DIAGNOSIS — E78.5 HYPERLIPIDEMIA, UNSPECIFIED HYPERLIPIDEMIA TYPE: ICD-10-CM

## 2018-12-24 RX ORDER — PRAVASTATIN SODIUM 40 MG
TABLET ORAL
Qty: 15 TABLET | Refills: 0 | OUTPATIENT
Start: 2018-12-24

## 2018-12-26 DIAGNOSIS — I10 ESSENTIAL HYPERTENSION: ICD-10-CM

## 2018-12-26 RX ORDER — RAMIPRIL 10 MG/1
20 CAPSULE ORAL DAILY
Qty: 60 CAPSULE | Refills: 0 | Status: SHIPPED | OUTPATIENT
Start: 2018-12-26 | End: 2019-01-21 | Stop reason: SDUPTHER

## 2018-12-27 DIAGNOSIS — E78.5 HYPERLIPIDEMIA, UNSPECIFIED HYPERLIPIDEMIA TYPE: ICD-10-CM

## 2018-12-27 DIAGNOSIS — I10 ESSENTIAL HYPERTENSION: ICD-10-CM

## 2018-12-27 RX ORDER — PRAVASTATIN SODIUM 40 MG
40 TABLET ORAL DAILY
Qty: 30 TABLET | Refills: 1 | Status: SHIPPED | OUTPATIENT
Start: 2018-12-27 | End: 2019-01-21 | Stop reason: SDUPTHER

## 2018-12-27 RX ORDER — HYDROCHLOROTHIAZIDE 25 MG/1
25 TABLET ORAL DAILY
Qty: 30 TABLET | Refills: 1 | Status: SHIPPED | OUTPATIENT
Start: 2018-12-27 | End: 2019-04-23 | Stop reason: SDUPTHER

## 2019-01-16 ENCOUNTER — TELEPHONE (OUTPATIENT)
Dept: INTERNAL MEDICINE | Age: 54
End: 2019-01-16

## 2019-01-16 RX ORDER — SCOLOPAMINE TRANSDERMAL SYSTEM 1 MG/1
1 PATCH, EXTENDED RELEASE TRANSDERMAL
Qty: 6 EACH | Refills: 0 | Status: SHIPPED | OUTPATIENT
Start: 2019-01-16 | End: 2019-01-18 | Stop reason: SDUPTHER

## 2019-01-16 RX ORDER — ESCITALOPRAM OXALATE 10 MG/1
10 TABLET ORAL DAILY
Qty: 30 TABLET | Refills: 1 | Status: SHIPPED | OUTPATIENT
Start: 2019-01-16 | End: 2019-02-07 | Stop reason: SDUPTHER

## 2019-01-16 NOTE — TELEPHONE ENCOUNTER
Regarding: Prescription Question  Contact: [phone]  ----- Message from Mychart, Generic sent at 1/16/2019  8:38 AM EST -----    I am going on a cruise the end of January and would like to see if I can get a prescription for a motion sickness patch?    Thank you,  Niesha

## 2019-01-16 NOTE — TELEPHONE ENCOUNTER
Find out how many days the Cruise is and then prescribe appropriate number of patches for use at 1 patch every 3 days.

## 2019-01-21 DIAGNOSIS — I10 ESSENTIAL HYPERTENSION: ICD-10-CM

## 2019-01-21 DIAGNOSIS — E78.5 HYPERLIPIDEMIA, UNSPECIFIED HYPERLIPIDEMIA TYPE: ICD-10-CM

## 2019-01-21 RX ORDER — PRAVASTATIN SODIUM 40 MG
40 TABLET ORAL DAILY
Qty: 30 TABLET | Refills: 2 | Status: SHIPPED | OUTPATIENT
Start: 2019-01-21 | End: 2019-07-30 | Stop reason: SDUPTHER

## 2019-01-21 RX ORDER — RAMIPRIL 10 MG/1
CAPSULE ORAL
Qty: 60 CAPSULE | Refills: 0 | Status: SHIPPED | OUTPATIENT
Start: 2019-01-21 | End: 2019-02-07

## 2019-01-21 RX ORDER — RAMIPRIL 10 MG/1
CAPSULE ORAL
Qty: 90 CAPSULE | Refills: 0 | Status: SHIPPED | OUTPATIENT
Start: 2019-01-21 | End: 2019-03-07 | Stop reason: SDUPTHER

## 2019-01-21 RX ORDER — HYDROCHLOROTHIAZIDE 25 MG/1
TABLET ORAL
Qty: 90 TABLET | Refills: 0 | Status: SHIPPED | OUTPATIENT
Start: 2019-01-21 | End: 2019-02-07

## 2019-02-06 NOTE — PROGRESS NOTES
SUBJECTIVE:   Chief Complaint   Patient presents with   • Medication Reaction     pt states she is to check on the lexapro reaction        Niesha Bui is a 53 y.o.  who presents for follow up of vasomotor symptoms  On Lexapro, started 2018 for VMS  Doing great  No night sweats or hot flashes, feels that her anxiety has improved.  Has Fu with PCP next month    Past Medical History:   Diagnosis Date   • Adiposity    • Allergic Sulfa   • Essential hypertension    • Hyperlipidemia    • Obesity (BMI 30-39.9)    • UTI (urinary tract infection)      Past Surgical History:   Procedure Laterality Date   • COLONOSCOPY      Good   • TONSILLECTOMY       OB History    Para Term  AB Living   0 0 0 0 0 0   SAB TAB Ectopic Molar Multiple Live Births   0 0 0   0              Social History     Tobacco Use   • Smoking status: Never Smoker   • Smokeless tobacco: Never Used   Substance Use Topics   • Alcohol use: Yes     Types: 2 - 4 Glasses of wine per week     Comment: Occasional social drinks   • Drug use: No     Family History   Problem Relation Age of Onset   • Coronary artery disease Other         MI   • Early death Sister    • Hypertension Mother    • Breast cancer Neg Hx    • Ovarian cancer Neg Hx    • Uterine cancer Neg Hx    • Colon cancer Neg Hx      Current Outpatient Medications on File Prior to Visit   Medication Sig Dispense Refill   • aspirin 81 MG tablet Take 81 mg by mouth.     • hydrochlorothiazide (HYDRODIURIL) 25 MG tablet Take 1 tablet by mouth Daily. 30 tablet 1   • Multiple Minerals-Vitamins (CALCIUM CITRATE PLUS/MAGNESIUM) tablet Take  by mouth.     • pravastatin (PRAVACHOL) 40 MG tablet Take 1 tablet by mouth Daily. 30 tablet 2   • ramipril (ALTACE) 10 MG capsule TAKE 1 CAPSULE DAILY 90 capsule 0   • TRANSDERM-SCOP, 1.5 MG, 1.5 MG/3DAYS patch Place 1 patch on the skin as directed by provider Every 72 (Seventy-Two) Hours. Brand name only 6 each 0   • Vitamin D,  "Cholecalciferol, 1000 units capsule Take  by mouth.     • [DISCONTINUED] escitalopram (LEXAPRO) 10 MG tablet Take 1 tablet by mouth Daily. 30 tablet 1   • MAGNESIUM CHLORIDE PO Take  by mouth.     • [DISCONTINUED] hydrochlorothiazide (HYDRODIURIL) 25 MG tablet TAKE 1 TABLET DAILY 90 tablet 0   • [DISCONTINUED] ramipril (ALTACE) 10 MG capsule TAKE 2 CAPSULES BY MOUTH EVERY DAY 60 capsule 0     No current facility-administered medications on file prior to visit.      Allergies   Allergen Reactions   • Sulfa Antibiotics Hives        Review of Systems   Constitutional: Negative for chills and fever.   Respiratory: Negative for shortness of breath.    Cardiovascular: Negative for chest pain.   Gastrointestinal: Negative for abdominal pain.   Genitourinary: Negative for difficulty urinating, pelvic pain and vaginal bleeding.   Musculoskeletal: Negative for myalgias.   Neurological: Negative for dizziness and light-headedness.         OBJECTIVE:   Vitals:    02/07/19 1050   BP: 128/93   Pulse: 85   Weight: 84.4 kg (186 lb)   Height: 165.1 cm (65\")      Physical Exam   Constitutional: She is oriented to person, place, and time. She appears well-developed and well-nourished. No distress.   HENT:   Head: Normocephalic and atraumatic.   Eyes: EOM are normal. No scleral icterus.   Neck: Normal range of motion.   Cardiovascular: Normal rate and regular rhythm.   Pulmonary/Chest: Effort normal. No respiratory distress.   Abdominal: Soft. She exhibits no distension.   Musculoskeletal: Normal range of motion.   Neurological: She is alert and oriented to person, place, and time.   Skin: Skin is warm and dry. No rash noted. She is not diaphoretic. No erythema.   Psychiatric: She has a normal mood and affect. Her behavior is normal. Judgment and thought content normal.       ASSESSMENT/PLAN:     ICD-10-CM ICD-9-CM   1. Menopausal symptoms N95.1 627.2       Pt would like to continue Lexapro.  Doing well  Rx. Sent  Will schedule " mammogram and annual exam.    Return in about 5 weeks (around 3/15/2019) for mammogram, annual.

## 2019-02-07 ENCOUNTER — OFFICE VISIT (OUTPATIENT)
Dept: OBSTETRICS AND GYNECOLOGY | Facility: CLINIC | Age: 54
End: 2019-02-07

## 2019-02-07 VITALS
HEIGHT: 65 IN | SYSTOLIC BLOOD PRESSURE: 128 MMHG | DIASTOLIC BLOOD PRESSURE: 93 MMHG | WEIGHT: 186 LBS | HEART RATE: 85 BPM | BODY MASS INDEX: 30.99 KG/M2

## 2019-02-07 DIAGNOSIS — N95.1 MENOPAUSAL SYMPTOMS: Primary | ICD-10-CM

## 2019-02-07 PROCEDURE — 99213 OFFICE O/P EST LOW 20 MIN: CPT | Performed by: OBSTETRICS & GYNECOLOGY

## 2019-02-07 RX ORDER — ESCITALOPRAM OXALATE 10 MG/1
10 TABLET ORAL DAILY
Qty: 90 TABLET | Refills: 4 | Status: SHIPPED | OUTPATIENT
Start: 2019-02-07 | End: 2019-03-22 | Stop reason: SDUPTHER

## 2019-03-07 DIAGNOSIS — I10 ESSENTIAL HYPERTENSION: ICD-10-CM

## 2019-03-07 RX ORDER — RAMIPRIL 10 MG/1
10 CAPSULE ORAL DAILY
Qty: 30 CAPSULE | Refills: 0 | Status: SHIPPED | OUTPATIENT
Start: 2019-03-07 | End: 2019-03-14 | Stop reason: SDUPTHER

## 2019-03-14 ENCOUNTER — OFFICE VISIT (OUTPATIENT)
Dept: INTERNAL MEDICINE | Age: 54
End: 2019-03-14

## 2019-03-14 VITALS
WEIGHT: 187.4 LBS | DIASTOLIC BLOOD PRESSURE: 86 MMHG | SYSTOLIC BLOOD PRESSURE: 142 MMHG | HEART RATE: 82 BPM | TEMPERATURE: 97.9 F | OXYGEN SATURATION: 100 % | HEIGHT: 65 IN | BODY MASS INDEX: 31.22 KG/M2

## 2019-03-14 DIAGNOSIS — E78.01 FAMILIAL HYPERCHOLESTEROLEMIA: ICD-10-CM

## 2019-03-14 DIAGNOSIS — Z00.00 ROUTINE HEALTH MAINTENANCE: ICD-10-CM

## 2019-03-14 DIAGNOSIS — I10 ESSENTIAL HYPERTENSION: Primary | ICD-10-CM

## 2019-03-14 DIAGNOSIS — R73.09 ELEVATED GLUCOSE: ICD-10-CM

## 2019-03-14 PROCEDURE — 99214 OFFICE O/P EST MOD 30 MIN: CPT | Performed by: NURSE PRACTITIONER

## 2019-03-14 RX ORDER — RAMIPRIL 10 MG/1
20 CAPSULE ORAL DAILY
Qty: 180 CAPSULE | Refills: 0 | Status: SHIPPED | OUTPATIENT
Start: 2019-03-14 | End: 2019-05-07 | Stop reason: SDUPTHER

## 2019-03-14 NOTE — PROGRESS NOTES
[facility] INTERNAL MEDICINE  Trina Bui / 53 y.o. / female  03/14/2019      ASSESSMENT & PLAN:    Problem List Items Addressed This Visit        Cardiovascular and Mediastinum    Essential hypertension - Primary    Relevant Medications    hydrochlorothiazide (HYDRODIURIL) 25 MG tablet    ramipril (ALTACE) 10 MG capsule    Other Relevant Orders    Comprehensive Metabolic Panel    Hyperlipidemia    Relevant Medications    pravastatin (PRAVACHOL) 40 MG tablet    Other Relevant Orders    Comprehensive Metabolic Panel    Lipid Panel With / Chol / HDL Ratio       Other    Routine health maintenance    Relevant Orders    CBC & Differential    TSH Rfx On Abnormal To Free T4    Lipid Panel With / Chol / HDL Ratio    Hemoglobin A1c    Elevated glucose    Overview     November 14, 2017, 114 mg percent         Relevant Orders    Comprehensive Metabolic Panel    Hemoglobin A1c        Orders Placed This Encounter   Procedures   • Comprehensive Metabolic Panel   • TSH Rfx On Abnormal To Free T4   • Lipid Panel With / Chol / HDL Ratio   • Hemoglobin A1c   • CBC & Differential     New Medications Ordered This Visit   Medications   • ramipril (ALTACE) 10 MG capsule     Sig: Take 2 capsules by mouth Daily.     Dispense:  180 capsule     Refill:  0       Summary/Discussion:    1. Essential hypertension  Blood pressure slightly elevated today in office, patient reports history of whitecoat hypertension.  Blood pressures at home of been within acceptable limits.  Continue same, patient will return to office in approximately 1 week for fasting labs.  Recommended work on increasing physical activity, continue to monitor diet.     - ramipril (ALTACE) 10 MG capsule; Take 2 capsules by mouth Daily.  Dispense: 180 capsule; Refill: 0  - Comprehensive Metabolic Panel    2. Familial hypercholesterolemia  Will check FLP next week, adjust medication as necessary.     - Comprehensive Metabolic Panel  - Lipid Panel With / Chol  "/ HDL Ratio    3. Elevated glucose  History of elevated glucose, recheck fasting glucose next week in addition hemoglobin A1c.  Further evaluation and management pending results of labs.    - Comprehensive Metabolic Panel  - Hemoglobin A1c    4. Routine health maintenance    - CBC & Differential  - TSH Rfx On Abnormal To Free T4  - Lipid Panel With / Chol / HDL Ratio  - Hemoglobin A1c        Return in about 4 months (around 7/14/2019) for Next scheduled follow up.    ____________________________________________________________________    MEDICATIONS  Current Outpatient Medications   Medication Sig Dispense Refill   • escitalopram (LEXAPRO) 10 MG tablet Take 1 tablet by mouth Daily. 90 tablet 4   • hydrochlorothiazide (HYDRODIURIL) 25 MG tablet Take 1 tablet by mouth Daily. 30 tablet 1   • MAGNESIUM CHLORIDE PO Take  by mouth.     • Multiple Minerals-Vitamins (CALCIUM CITRATE PLUS/MAGNESIUM) tablet Take  by mouth.     • pravastatin (PRAVACHOL) 40 MG tablet Take 1 tablet by mouth Daily. 30 tablet 2   • ramipril (ALTACE) 10 MG capsule Take 2 capsules by mouth Daily. 180 capsule 0   • Vitamin D, Cholecalciferol, 1000 units capsule Take  by mouth.       No current facility-administered medications for this visit.           VITALS:    Visit Vitals  /86   Pulse 82   Temp 97.9 °F (36.6 °C)   Ht 165.1 cm (65\")   Wt 85 kg (187 lb 6.4 oz)   SpO2 100%   BMI 31.18 kg/m²       BP Readings from Last 3 Encounters:   03/14/19 142/86   02/07/19 128/93   06/25/18 (!) 155/112     Wt Readings from Last 3 Encounters:   03/14/19 85 kg (187 lb 6.4 oz)   02/07/19 84.4 kg (186 lb)   06/25/18 83 kg (183 lb)      Body mass index is 31.18 kg/m².    CC:  Main reason(s) for today's visit: Hypertension (f/u BP; transfer from Dr. Edwards; pt takes Ramipril 10mg BID, although Rx has been changed in chart for unknown reason)      HPI:     Hypertension: Patient here for follow-up of elevated blood pressure. She is not exercising and is adherent to " low salt diet. She reports diet overall is pretty good, but just returned back from a recent cruise with friends. Blood pressure is well controlled at home. Cardiac symptoms none. Patient denies chest pain, dyspnea, exertional chest pressure/discomfort, fatigue and lower extremity edema.  Cardiovascular risk factors: dyslipidemia, hypertension and obesity (BMI >= 30 kg/m2). Use of agents associated with hypertension: none. History of target organ damage: none.  Home BP readings have been 130-140/70s, patient reports history of white coat syndrome.     Hyperlipidemia: Currently on pravastatin 40mg nightly, tolerating well, no c/o adverse SE. Has not had fasting labwork since 11/2017.     Patient Care Team:  Trina Pruett APRN as PCP - General (Internal Medicine)  Kesha Ellison MD as Consulting Physician (Obstetrics and Gynecology)    ____________________________________________________________________    REVIEW OF SYSTEMS    Review of Systems   Constitutional: Negative for activity change, appetite change, fatigue and unexpected weight change.   Eyes: Negative for visual disturbance.   Respiratory: Negative for cough and shortness of breath.    Cardiovascular: Negative for chest pain, palpitations and leg swelling.   Neurological: Negative for light-headedness and headaches.         PHYSICAL EXAMINATION    Physical Exam   Constitutional: She is oriented to person, place, and time. Vital signs are normal. She appears well-developed and well-nourished. She is cooperative. She does not appear ill. No distress.   Cardiovascular: Normal rate, regular rhythm, S1 normal, S2 normal and normal heart sounds.   No murmur heard.  Pulmonary/Chest: Effort normal and breath sounds normal. She has no decreased breath sounds. She has no wheezes. She has no rhonchi. She has no rales.   Neurological: She is alert and oriented to person, place, and time.   Skin: Skin is warm, dry and intact.   Psychiatric: She has a normal mood  and affect. Her speech is normal and behavior is normal. Judgment and thought content normal. Cognition and memory are normal.   Nursing note and vitals reviewed.      REVIEWED DATA:    Labs:     Lab Results   Component Value Date     11/13/2017    K 4.6 11/13/2017    AST 21 11/13/2017    ALT 16 11/13/2017    BUN 17 11/13/2017    CREATININE 0.90 11/13/2017    CREATININE 0.77 08/09/2016    CREATININE 0.85 04/30/2015    EGFRIFNONA 66 11/13/2017    EGFRIFAFRI 80 11/13/2017       Lab Results   Component Value Date    GLUCOSE 98 05/16/2014       Lab Results   Component Value Date    LDL 93 11/13/2017    LDL 77 08/09/2016    LDL 92 04/30/2015    HDL 83 (H) 11/13/2017    HDL 88 (H) 08/09/2016    HDL 72 04/30/2015    TRIG 95 11/13/2017    TRIG 107 08/09/2016    TRIG 61 04/30/2015       No results found for: TSH, FREET4    Lab Results   Component Value Date    WBC 5.23 11/13/2017    HGB 14.8 11/13/2017    HGB 14.5 04/30/2015     11/13/2017       Lab Results   Component Value Date    PROTEIN Negative 11/13/2017    GLUCOSEU Negative 11/13/2017    BLOODU Negative 11/13/2017    NITRITEU Negative 11/13/2017    LEUKOCYTESUR Negative 11/13/2017       Imaging:         Medical Tests:         Summary of old records / correspondence / consultant report:         Request outside records:         ALLERGIES  Allergies   Allergen Reactions   • Sulfa Antibiotics Hives        PFSH:     The following portions of the patient's history were reviewed and updated as appropriate: Allergies / Current Medications / Past Medical History / Surgical History / Social History / Family History    PROBLEM LIST   Patient Active Problem List   Diagnosis   • Essential hypertension   • Hyperlipidemia   • Obesity (BMI 30-39.9)   • Routine health maintenance   • Elevated glucose   • White coat syndrome with diagnosis of hypertension       PAST MEDICAL HISTORY  Past Medical History:   Diagnosis Date   • Adiposity    • Allergic Sulfa   • Essential  hypertension    • Hyperlipidemia    • Obesity (BMI 30-39.9)    • UTI (urinary tract infection)        SURGICAL HISTORY  Past Surgical History:   Procedure Laterality Date   • COLONOSCOPY  2017    Good   • TONSILLECTOMY  1972       SOCIAL HISTORY  Social History     Socioeconomic History   • Marital status:      Spouse name: Not on file   • Number of children: 0   • Years of education: Not on file   • Highest education level: Not on file   Occupational History   • 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[employer]   Tobacco Use   • Smoking status: Never Smoker   • Smokeless tobacco: Never Used   Substance and Sexual Activity   • Alcohol use: Yes     Types: 2 - 4 Glasses of wine per week     Comment: Occasional social drinks   • Drug use: No   • Sexual activity: Yes     Partners: Male     Birth control/protection: OCP   Social History Narrative     March 2016       FAMILY HISTORY  Family History   Problem Relation Age of Onset   • Coronary artery disease Other         MI   • Early death Sister    • ALS Sister    • Hypertension Mother    • Heart disease Mother    • Heart attack Mother    • Breast cancer Neg Hx    • Ovarian cancer Neg Hx    • Uterine cancer Neg Hx    • Colon cancer Neg Hx          **[place] Disclaimer:   Much of this encounter note is an electronic transcription/translation of spoken language to printed text. The electronic translation of spoken language may permit erroneous, or at times, nonsensical words or phrases to be inadvertently transcribed. Although I have reviewed the note for such errors, some may still exist.

## 2019-03-22 ENCOUNTER — PROCEDURE VISIT (OUTPATIENT)
Dept: OBSTETRICS AND GYNECOLOGY | Facility: CLINIC | Age: 54
End: 2019-03-22

## 2019-03-22 ENCOUNTER — APPOINTMENT (OUTPATIENT)
Dept: WOMENS IMAGING | Facility: HOSPITAL | Age: 54
End: 2019-03-22

## 2019-03-22 ENCOUNTER — OFFICE VISIT (OUTPATIENT)
Dept: OBSTETRICS AND GYNECOLOGY | Facility: CLINIC | Age: 54
End: 2019-03-22

## 2019-03-22 VITALS
WEIGHT: 188 LBS | HEIGHT: 65 IN | SYSTOLIC BLOOD PRESSURE: 120 MMHG | BODY MASS INDEX: 31.32 KG/M2 | DIASTOLIC BLOOD PRESSURE: 78 MMHG

## 2019-03-22 DIAGNOSIS — Z01.419 WELL WOMAN EXAM WITH ROUTINE GYNECOLOGICAL EXAM: Primary | ICD-10-CM

## 2019-03-22 DIAGNOSIS — Z12.31 VISIT FOR SCREENING MAMMOGRAM: Primary | ICD-10-CM

## 2019-03-22 LAB
ALBUMIN SERPL-MCNC: 4.4 G/DL (ref 3.5–5.2)
ALBUMIN/GLOB SERPL: 1.8 G/DL
ALP SERPL-CCNC: 72 U/L (ref 39–117)
ALT SERPL-CCNC: 18 U/L (ref 1–33)
AST SERPL-CCNC: 23 U/L (ref 1–32)
BASOPHILS # BLD AUTO: 0.03 10*3/MM3 (ref 0–0.2)
BASOPHILS NFR BLD AUTO: 0.8 % (ref 0–1.5)
BILIRUB SERPL-MCNC: 0.3 MG/DL (ref 0.2–1.2)
BUN SERPL-MCNC: 17 MG/DL (ref 6–20)
BUN/CREAT SERPL: 27 (ref 7–25)
CALCIUM SERPL-MCNC: 9.5 MG/DL (ref 8.6–10.5)
CHLORIDE SERPL-SCNC: 100 MMOL/L (ref 98–107)
CHOLEST SERPL-MCNC: 209 MG/DL (ref 0–200)
CHOLEST/HDLC SERPL: 2.25 {RATIO}
CO2 SERPL-SCNC: 25.7 MMOL/L (ref 22–29)
CREAT SERPL-MCNC: 0.63 MG/DL (ref 0.57–1)
EOSINOPHIL # BLD AUTO: 0.12 10*3/MM3 (ref 0–0.4)
EOSINOPHIL NFR BLD AUTO: 3.3 % (ref 0.3–6.2)
ERYTHROCYTE [DISTWIDTH] IN BLOOD BY AUTOMATED COUNT: 14.2 % (ref 12.3–15.4)
GLOBULIN SER CALC-MCNC: 2.5 GM/DL
GLUCOSE SERPL-MCNC: 91 MG/DL (ref 65–99)
HBA1C MFR BLD: 5.45 % (ref 4.8–5.6)
HCT VFR BLD AUTO: 44 % (ref 34–46.6)
HDLC SERPL-MCNC: 93 MG/DL (ref 40–60)
HGB BLD-MCNC: 14.2 G/DL (ref 12–15.9)
IMM GRANULOCYTES # BLD AUTO: 0.01 10*3/MM3 (ref 0–0.05)
IMM GRANULOCYTES NFR BLD AUTO: 0.3 % (ref 0–0.5)
LDLC SERPL CALC-MCNC: 105 MG/DL (ref 0–100)
LYMPHOCYTES # BLD AUTO: 1.45 10*3/MM3 (ref 0.7–3.1)
LYMPHOCYTES NFR BLD AUTO: 39.4 % (ref 19.6–45.3)
MCH RBC QN AUTO: 31.2 PG (ref 26.6–33)
MCHC RBC AUTO-ENTMCNC: 32.3 G/DL (ref 31.5–35.7)
MCV RBC AUTO: 96.7 FL (ref 79–97)
MONOCYTES # BLD AUTO: 0.29 10*3/MM3 (ref 0.1–0.9)
MONOCYTES NFR BLD AUTO: 7.9 % (ref 5–12)
NEUTROPHILS # BLD AUTO: 1.78 10*3/MM3 (ref 1.4–7)
NEUTROPHILS NFR BLD AUTO: 48.3 % (ref 42.7–76)
NRBC BLD AUTO-RTO: 0 /100 WBC (ref 0–0)
PLATELET # BLD AUTO: 263 10*3/MM3 (ref 140–450)
POTASSIUM SERPL-SCNC: 4.7 MMOL/L (ref 3.5–5.2)
PROT SERPL-MCNC: 6.9 G/DL (ref 6–8.5)
RBC # BLD AUTO: 4.55 10*6/MM3 (ref 3.77–5.28)
SODIUM SERPL-SCNC: 140 MMOL/L (ref 136–145)
T4 FREE SERPL-MCNC: NORMAL NG/DL
TRIGL SERPL-MCNC: 53 MG/DL (ref 0–150)
TSH SERPL DL<=0.005 MIU/L-ACNC: 2.5 MIU/ML (ref 0.27–4.2)
VLDLC SERPL CALC-MCNC: 10.6 MG/DL (ref 5–40)
WBC # BLD AUTO: 3.68 10*3/MM3 (ref 3.4–10.8)

## 2019-03-22 PROCEDURE — 77067 SCR MAMMO BI INCL CAD: CPT | Performed by: RADIOLOGY

## 2019-03-22 PROCEDURE — 99396 PREV VISIT EST AGE 40-64: CPT | Performed by: OBSTETRICS & GYNECOLOGY

## 2019-03-22 PROCEDURE — 77067 SCR MAMMO BI INCL CAD: CPT | Performed by: OBSTETRICS & GYNECOLOGY

## 2019-03-22 RX ORDER — ESCITALOPRAM OXALATE 10 MG/1
10 TABLET ORAL DAILY
Qty: 90 TABLET | Refills: 4 | Status: SHIPPED | OUTPATIENT
Start: 2019-03-22 | End: 2020-02-29

## 2019-03-22 NOTE — PROGRESS NOTES
Subjective   Niesha Bui is a 53 y.o. female   Chief Complaint   Patient presents with   • Gynecologic Exam     AE       Niesha Bui is a 53 y.o.  who presents for an annual examination     Pap history:  Last pap: 16 NIL, HPV negative  Prior abnormal paps: yes, years and years ago  STDs  Sexually active: yes - newly   History of STDs: no  Contraception:  No menses since coming off oral contraceptive pills in 2018  Colonoscopy: 1.5 years ago, normal  Mammogram: 20   Menopause: around 51yo, after coming off oral contraceptive pill  VMS: yes, better on lexapro    History of physical abuse: no, History of sexual abuse: no and History of verbal/emotional abuse: no    Screening for BRCA-   Is patient's family history significant for any of the following?   no  For non-[employer]azi [employer] women:   Two first-degree relatives with breast cancer, one of whom was diagnosed at age 50 or younger   A combination of three or more first or second-degree relatives with breast cancer regardless of age at diagnosis   A combination of both breast and ovarian cancer among first and second-degree relatives   A first-degree relative with bilateral breast cancer   A combination of two or more first or second degree relatives with ovarian cancer, regardless of age at diagnosis   A first or second-degree relative with both breast and ovarian cancer at any age   History of breast cancer in a male relative   For women of Ashkenazi [employer] descent:   Any first-degree relative (or two second degree relatives on the same side of the family) with breast or ovarian cancer   Recommendations from the [place] Preventive Services Task Force on who should be offered genetic testing for BRCA mutations*     Past Medical History:   Diagnosis Date   • Adiposity    • Allergic Sulfa   • Essential hypertension    • Hyperlipidemia    • Obesity (BMI 30-39.9)    • UTI (urinary tract infection)      Past Surgical History:    Procedure Laterality Date   • COLONOSCOPY  2017    Good   • TONSILLECTOMY  1972     Social History     Tobacco Use   • Smoking status: Never Smoker   • Smokeless tobacco: Never Used   Substance Use Topics   • Alcohol use: Yes     Types: 2 - 4 Glasses of wine per week     Comment: Occasional social drinks   • Drug use: No     Family History   Problem Relation Age of Onset   • Coronary artery disease Other         MI   • Early death Sister    • ALS Sister    • Hypertension Mother    • Heart disease Mother    • Heart attack Mother    • Breast cancer Neg Hx    • Ovarian cancer Neg Hx    • Uterine cancer Neg Hx    • Colon cancer Neg Hx      Current Outpatient Medications on File Prior to Visit   Medication Sig Dispense Refill   • hydrochlorothiazide (HYDRODIURIL) 25 MG tablet Take 1 tablet by mouth Daily. 30 tablet 1   • MAGNESIUM CHLORIDE PO Take  by mouth.     • Multiple Minerals-Vitamins (CALCIUM CITRATE PLUS/MAGNESIUM) tablet Take  by mouth.     • pravastatin (PRAVACHOL) 40 MG tablet Take 1 tablet by mouth Daily. 30 tablet 2   • ramipril (ALTACE) 10 MG capsule Take 2 capsules by mouth Daily. 180 capsule 0   • Vitamin D, Cholecalciferol, 1000 units capsule Take  by mouth.     • [DISCONTINUED] escitalopram (LEXAPRO) 10 MG tablet Take 1 tablet by mouth Daily. 90 tablet 4     No current facility-administered medications on file prior to visit.      Allergies   Allergen Reactions   • Sulfa Antibiotics Hives        Review of Systems   Constitutional: Negative for chills, fever and unexpected weight change.   HENT: Negative for congestion, nosebleeds and sore throat.    Eyes: Negative for visual disturbance.   Respiratory: Negative for cough, chest tightness and shortness of breath.    Cardiovascular: Negative for chest pain and palpitations.   Gastrointestinal: Negative for abdominal pain, constipation, diarrhea, nausea and vomiting.   Endocrine: Negative for polyuria.   Genitourinary: Negative for difficulty urinating,  "dyspareunia, dysuria, frequency, genital sores, hematuria, menstrual problem, pelvic pain, urgency, vaginal bleeding, vaginal discharge and vaginal pain.   Musculoskeletal: Negative for arthralgias and joint swelling.   Skin: Negative for color change and rash.   Neurological: Negative for dizziness, light-headedness and headaches.   Hematological: Does not bruise/bleed easily.   Psychiatric/Behavioral: Negative for dysphoric mood. The patient is not nervous/anxious.        Objective    /78   Ht 165.1 cm (65\")   Wt 85.3 kg (188 lb)   LMP 03/07/2018 (Exact Date)   BMI 31.28 kg/m²    Physical Exam   Constitutional: She is oriented to person, place, and time. She appears well-developed and well-nourished. No distress.   HENT:   Head: Normocephalic and atraumatic.   Neck: No tracheal deviation present. No thyromegaly present.   Cardiovascular: Normal rate, regular rhythm and normal heart sounds. Exam reveals no gallop and no friction rub.   No murmur heard.  Pulmonary/Chest: Effort normal and breath sounds normal. No respiratory distress. She has no wheezes. She has no rales. She exhibits no tenderness. Right breast exhibits no inverted nipple, no mass, no nipple discharge, no skin change and no tenderness. Left breast exhibits no inverted nipple, no mass, no nipple discharge, no skin change and no tenderness.   Abdominal: Soft. She exhibits no distension and no mass. There is no tenderness.   Genitourinary: Uterus normal. No labial fusion. There is no rash, lesion or injury on the right labia. There is no rash, lesion or injury on the left labia. Uterus is not deviated, not enlarged, not fixed and not tender. Cervix exhibits no motion tenderness, no discharge and no friability. Right adnexum displays no mass, no tenderness and no fullness. Left adnexum displays no mass, no tenderness and no fullness. No tenderness or bleeding in the vagina. No vaginal discharge found.   Musculoskeletal: Normal range of " motion. She exhibits no edema or deformity.   Lymphadenopathy:     She has no cervical adenopathy.   Neurological: She is alert and oriented to person, place, and time.   Skin: Skin is warm and dry. No rash noted. She is not diaphoretic.   Psychiatric: She has a normal mood and affect. Her behavior is normal. Judgment and thought content normal.         Assessment/Plan   Niesha was seen today for gynecologic exam.    Diagnoses and all orders for this visit:    Well woman exam with routine gynecological exam    Other orders  -     escitalopram (LEXAPRO) 10 MG tablet; Take 1 tablet by mouth Daily.        Well woman counseling/screening:    Cervical cancer screening:    Reports remote h/o cervical dysplasia  The patient is due for a pap in 2021.    Screening guidelines discussed with patient  Breast cancer screening:    Clinical breast exam recommended for age 20-39 years every 1-3 years   Mammogram recommended starting age 40, done today   Breast self awareness encouraged  STD Screening   Declined  VMS of menopause:   Would like to continue Lexapro, Rx sent  Family history    does not demonstrate need for genetics referral   Healthy lifestyle counseling:   Body mass index is 31.28 kg/m².   Colonoscopy   2017    Hypertension:  Patient had elevation last annual but this year is much improved    BMI counseling  Her BMI is classified as Obese  Is Niesha Bui ready to make a healthy lifestyle change today? yes  Patient has tried walking, decreasing carbs  Today, the course of action is: goal weight 155lb, gained 5lb since June 2018 but attributes to being on a lot of vacations

## 2019-04-23 DIAGNOSIS — I10 ESSENTIAL HYPERTENSION: ICD-10-CM

## 2019-04-23 RX ORDER — HYDROCHLOROTHIAZIDE 25 MG/1
25 TABLET ORAL DAILY
Qty: 30 TABLET | Refills: 2 | Status: SHIPPED | OUTPATIENT
Start: 2019-04-23 | End: 2019-07-05 | Stop reason: SDUPTHER

## 2019-05-07 DIAGNOSIS — I10 ESSENTIAL HYPERTENSION: ICD-10-CM

## 2019-05-07 RX ORDER — RAMIPRIL 10 MG/1
20 CAPSULE ORAL DAILY
Qty: 180 CAPSULE | Refills: 0 | Status: SHIPPED | OUTPATIENT
Start: 2019-05-07 | End: 2019-08-08 | Stop reason: SDUPTHER

## 2019-06-04 DIAGNOSIS — I10 ESSENTIAL HYPERTENSION: ICD-10-CM

## 2019-06-04 RX ORDER — RAMIPRIL 10 MG/1
CAPSULE ORAL
Qty: 180 CAPSULE | Refills: 0 | OUTPATIENT
Start: 2019-06-04

## 2019-07-05 DIAGNOSIS — I10 ESSENTIAL HYPERTENSION: ICD-10-CM

## 2019-07-05 RX ORDER — HYDROCHLOROTHIAZIDE 25 MG/1
TABLET ORAL
Qty: 30 TABLET | Refills: 2 | Status: SHIPPED | OUTPATIENT
Start: 2019-07-05 | End: 2019-10-08 | Stop reason: SDUPTHER

## 2019-07-18 ENCOUNTER — OFFICE VISIT (OUTPATIENT)
Dept: INTERNAL MEDICINE | Age: 54
End: 2019-07-18

## 2019-07-18 VITALS
DIASTOLIC BLOOD PRESSURE: 84 MMHG | OXYGEN SATURATION: 99 % | HEIGHT: 65 IN | TEMPERATURE: 98.1 F | HEART RATE: 75 BPM | SYSTOLIC BLOOD PRESSURE: 136 MMHG | BODY MASS INDEX: 31.42 KG/M2 | WEIGHT: 188.6 LBS

## 2019-07-18 DIAGNOSIS — N95.1 MENOPAUSAL HOT FLUSHES: ICD-10-CM

## 2019-07-18 DIAGNOSIS — I10 ESSENTIAL HYPERTENSION: Primary | ICD-10-CM

## 2019-07-18 DIAGNOSIS — E78.01 FAMILIAL HYPERCHOLESTEROLEMIA: ICD-10-CM

## 2019-07-18 PROCEDURE — 99214 OFFICE O/P EST MOD 30 MIN: CPT | Performed by: NURSE PRACTITIONER

## 2019-07-18 RX ORDER — AMLODIPINE BESYLATE 2.5 MG/1
2.5 TABLET ORAL DAILY
Qty: 30 TABLET | Refills: 5 | Status: SHIPPED | OUTPATIENT
Start: 2019-07-18 | End: 2020-01-09

## 2019-07-18 NOTE — PROGRESS NOTES
[facility] [place] [facility]  Trina Bui / 54 y.o. / female  07/18/2019      ASSESSMENT & PLAN:    Problem List Items Addressed This Visit        Cardiovascular and Mediastinum    Essential hypertension - Primary    Relevant Medications    ramipril (ALTACE) 10 MG capsule    hydrochlorothiazide (HYDRODIURIL) 25 MG tablet    amLODIPine (NORVASC) 2.5 MG tablet    Hyperlipidemia    Relevant Medications    pravastatin (PRAVACHOL) 40 MG tablet       Genitourinary    Menopausal hot flushes        No orders of the defined types were placed in this encounter.    New Medications Ordered This Visit   Medications   • amLODIPine (NORVASC) 2.5 MG tablet     Sig: Take 1 tablet by mouth Daily.     Dispense:  30 tablet     Refill:  5       Summary/Discussion:    1. Essential hypertension  Blood pressure still suboptimally controlled, readings have been slightly elevated at home as well.  Discussed with patient will add low-dose of amlodipine daily to current regimen.  Continue to monitor blood pressure at home and notify me if consistently still greater than 1 30-1 40 over 80s to 90s.  Otherwise, we will follow-up in office in 4 months for recheck.  Continue lifestyle modifications for high blood pressure, high cholesterol, and increased weight. Decrease/eliminate soda, caffeine, alcohol and overall caloric intake. Reduce carbohydrates and sweets in diet.  Continue to improve dietary habits with lean proteins, fresh vegetables, fruits, and nuts. Improve aerobic exercise: walking/biking/swimming daily as tolerated, recommend 30 minutes/day at least 5 days/week.     - amLODIPine (NORVASC) 2.5 MG tablet; Take 1 tablet by mouth Daily.  Dispense: 30 tablet; Refill: 5    2. Familial hypercholesterolemia  Labs are stable on current dose of pravastatin, continue same.    3. Menopausal hot flushes  On Lexapro was prescribed by GYN, tolerating well.  Continue same.         Return in about 4 months (around 11/18/2019)  "for Next scheduled follow up.    ____________________________________________________________________    MEDICATIONS  Current Outpatient Medications   Medication Sig Dispense Refill   • escitalopram (LEXAPRO) 10 MG tablet Take 1 tablet by mouth Daily. 90 tablet 4   • hydrochlorothiazide (HYDRODIURIL) 25 MG tablet TAKE 1 TABLET DAILY 30 tablet 2   • MAGNESIUM CHLORIDE PO Take  by mouth.     • Multiple Minerals-Vitamins (CALCIUM CITRATE PLUS/MAGNESIUM) tablet Take  by mouth.     • pravastatin (PRAVACHOL) 40 MG tablet Take 1 tablet by mouth Daily. 30 tablet 2   • ramipril (ALTACE) 10 MG capsule Take 2 capsules by mouth Daily. 180 capsule 0   • Vitamin D, Cholecalciferol, 1000 units capsule Take  by mouth.     • amLODIPine (NORVASC) 2.5 MG tablet Take 1 tablet by mouth Daily. 30 tablet 5     No current facility-administered medications for this visit.           VITALS:    Visit Vitals  /84   Pulse 75   Temp 98.1 °F (36.7 °C)   Ht 165.1 cm (65\")   Wt 85.5 kg (188 lb 9.6 oz)   LMP 03/07/2018 (Exact Date)   SpO2 99%   BMI 31.38 kg/m²       BP Readings from Last 3 Encounters:   07/18/19 136/84   03/22/19 120/78   03/14/19 142/86     Wt Readings from Last 3 Encounters:   07/18/19 85.5 kg (188 lb 9.6 oz)   03/22/19 85.3 kg (188 lb)   03/14/19 85 kg (187 lb 6.4 oz)      Body mass index is 31.38 kg/m².    CC:  Main reason(s) for today's visit: Hypertension (4 mo f/u)      HPI:   Patient here for four-month chronic medical follow-up.    Hypertension: Patient here for follow-up of elevated blood pressure. She is exercising and is adherent to low salt diet.  Blood pressure is not well controlled at home, reports BP rarely below 130-140s/ 80s. Cardiac symptoms none. Patient denies chest pain, chest pressure/discomfort, dyspnea, fatigue and lower extremity edema.  Cardiovascular risk factors: hypertension. Use of agents associated with hypertension: none. History of target organ damage: none. She does reports being currently " under increased stress at home r/t 's recent health issues as well as daughter and 1 year old grandson have recently moved in with her.      Hyperlipidemia: Patient currently on pravastatin 40 mg daily and tolerating without any adverse side effects.  Most recent fasting lipid panel done in March 2019 and values were within acceptable range.     She was also recently started on Lexapro by her GYN in March for postmenopausal hot flashes and reports this is also been effective in helping symptoms of anxiety.  She is tolerating medication without any adverse side effects.     Patient Care Team:  Trina Pruett APRN as PCP - General (Internal Medicine)  Kesha Ellison MD as Consulting Physician (Obstetrics and Gynecology)    ____________________________________________________________________    REVIEW OF SYSTEMS    Review of Systems   Constitutional: Negative for activity change, appetite change and unexpected weight change.   HENT: Negative for tinnitus.    Eyes: Negative for visual disturbance.   Respiratory: Negative for cough, chest tightness and shortness of breath.    Cardiovascular: Negative for chest pain, palpitations and leg swelling.   Neurological: Negative for dizziness, light-headedness and headaches.   Psychiatric/Behavioral: The patient is nervous/anxious.          PHYSICAL EXAMINATION    Physical Exam   Constitutional: She is oriented to person, place, and time. Vital signs are normal. She appears well-developed and well-nourished. She is cooperative. She does not appear ill. No distress.   Cardiovascular: Normal rate, regular rhythm, S1 normal, S2 normal and normal heart sounds.   No murmur heard.  Pulmonary/Chest: Effort normal and breath sounds normal. She has no decreased breath sounds. She has no wheezes. She has no rhonchi. She has no rales.   Neurological: She is alert and oriented to person, place, and time.   Skin: Skin is warm, dry and intact.   Psychiatric: She has a normal mood  and affect. Her speech is normal and behavior is normal. Judgment and thought content normal. Cognition and memory are normal.   Nursing note and vitals reviewed.      REVIEWED DATA:    Labs:     Lab Results   Component Value Date     03/21/2019    K 4.7 03/21/2019    AST 23 03/21/2019    ALT 18 03/21/2019    BUN 17 03/21/2019    CREATININE 0.63 03/21/2019    CREATININE 0.90 11/13/2017    CREATININE 0.77 08/09/2016    EGFRIFNONA 99 03/21/2019    EGFRIFAFRI 120 03/21/2019       Lab Results   Component Value Date    HGBA1C 5.45 03/21/2019    GLUCOSE 98 05/16/2014       Lab Results   Component Value Date     (H) 03/21/2019    LDL 93 11/13/2017    LDL 77 08/09/2016    HDL 93 (H) 03/21/2019    HDL 83 (H) 11/13/2017    HDL 88 (H) 08/09/2016    TRIG 53 03/21/2019    TRIG 95 11/13/2017    TRIG 107 08/09/2016    CHOLHDLRATIO 2.25 03/21/2019       Lab Results   Component Value Date    TSH 2.50 03/21/2019    FREET4 CANCELED 03/21/2019       Lab Results   Component Value Date    WBC 3.68 03/21/2019    HGB 14.2 03/21/2019    HGB 14.8 11/13/2017    HGB 14.5 04/30/2015     03/21/2019       Lab Results   Component Value Date    PROTEIN Negative 11/13/2017    GLUCOSEU Negative 11/13/2017    BLOODU Negative 11/13/2017    NITRITEU Negative 11/13/2017    LEUKOCYTESUR Negative 11/13/2017       Imaging:         Medical Tests:         Summary of old records / correspondence / consultant report:         Request outside records:         ALLERGIES  Allergies   Allergen Reactions   • Sulfa Antibiotics Hives        PFSH:     The following portions of the patient's history were reviewed and updated as appropriate: Allergies / Current Medications / Past Medical History / Surgical History / Social History / Family History    PROBLEM LIST   Patient Active Problem List   Diagnosis   • Essential hypertension   • Hyperlipidemia   • Obesity (BMI 30-39.9)   • Routine health maintenance   • Elevated glucose   • White coat syndrome  with diagnosis of hypertension   • Menopausal hot flushes       PAST MEDICAL HISTORY  Past Medical History:   Diagnosis Date   • Adiposity    • Allergic Sulfa   • Essential hypertension    • Hyperlipidemia    • Obesity (BMI 30-39.9)    • UTI (urinary tract infection)        SURGICAL HISTORY  Past Surgical History:   Procedure Laterality Date   • COLONOSCOPY  2017    Good   • TONSILLECTOMY  1972       SOCIAL HISTORY  Social History     Socioeconomic History   • Marital status:      Spouse name: Not on file   • Number of children: 0   • Years of education: Not on file   • Highest education level: Not on file   Occupational History   • 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[employer]   Tobacco Use   • Smoking status: Never Smoker   • Smokeless tobacco: Never Used   Substance and Sexual Activity   • Alcohol use: Yes     Types: 2 - 4 Glasses of wine per week     Comment: Occasional social drinks   • Drug use: No   • Sexual activity: Yes     Partners: Male     Birth control/protection: OCP   Social History Narrative     March 2016       FAMILY HISTORY  Family History   Problem Relation Age of Onset   • Coronary artery disease Other         MI   • Early death Sister    • ALS Sister    • Hypertension Mother    • Heart disease Mother    • Heart attack Mother    • Breast cancer Neg Hx    • Ovarian cancer Neg Hx    • Uterine cancer Neg Hx    • Colon cancer Neg Hx          **Dragon Disclaimer:   Much of this encounter note is an electronic transcription/translation of spoken language to printed text. The electronic translation of spoken language may permit erroneous, or at times, nonsensical words or phrases to be inadvertently transcribed. Although I have reviewed the note for such errors, some may still exist.         Answers for HPI/ROS submitted by the patient on 7/11/2019   Hypertension  Chronicity: chronic  Onset: more than 1 year ago  Progression since onset: waxing and waning  Condition status: resistant  anxiety: Yes  Agents  associated with hypertension: no associated agents  CAD risks: family history, obesity  Compliance problems: no compliance problems

## 2019-07-30 DIAGNOSIS — E78.5 HYPERLIPIDEMIA, UNSPECIFIED HYPERLIPIDEMIA TYPE: ICD-10-CM

## 2019-07-30 RX ORDER — PRAVASTATIN SODIUM 40 MG
40 TABLET ORAL DAILY
Qty: 30 TABLET | Refills: 5 | Status: SHIPPED | OUTPATIENT
Start: 2019-07-30 | End: 2020-01-23

## 2019-08-08 DIAGNOSIS — I10 ESSENTIAL HYPERTENSION: ICD-10-CM

## 2019-08-08 RX ORDER — RAMIPRIL 10 MG/1
CAPSULE ORAL
Qty: 180 CAPSULE | Refills: 0 | Status: SHIPPED | OUTPATIENT
Start: 2019-08-08 | End: 2019-11-06 | Stop reason: SDUPTHER

## 2019-10-08 DIAGNOSIS — I10 ESSENTIAL HYPERTENSION: ICD-10-CM

## 2019-10-08 RX ORDER — HYDROCHLOROTHIAZIDE 25 MG/1
TABLET ORAL
Qty: 30 TABLET | Refills: 2 | Status: SHIPPED | OUTPATIENT
Start: 2019-10-08 | End: 2019-10-09 | Stop reason: SDUPTHER

## 2019-10-09 DIAGNOSIS — I10 ESSENTIAL HYPERTENSION: ICD-10-CM

## 2019-10-09 RX ORDER — HYDROCHLOROTHIAZIDE 25 MG/1
25 TABLET ORAL DAILY
Qty: 90 TABLET | Refills: 0 | Status: SHIPPED | OUTPATIENT
Start: 2019-10-09 | End: 2020-01-06

## 2019-11-06 DIAGNOSIS — I10 ESSENTIAL HYPERTENSION: ICD-10-CM

## 2019-11-07 RX ORDER — RAMIPRIL 10 MG/1
CAPSULE ORAL
Qty: 180 CAPSULE | Refills: 0 | Status: SHIPPED | OUTPATIENT
Start: 2019-11-07 | End: 2020-02-04

## 2020-01-05 DIAGNOSIS — I10 ESSENTIAL HYPERTENSION: ICD-10-CM

## 2020-01-06 RX ORDER — HYDROCHLOROTHIAZIDE 25 MG/1
TABLET ORAL
Qty: 30 TABLET | Refills: 0 | Status: SHIPPED | OUTPATIENT
Start: 2020-01-06 | End: 2020-02-04

## 2020-01-09 DIAGNOSIS — I10 ESSENTIAL HYPERTENSION: ICD-10-CM

## 2020-01-09 RX ORDER — AMLODIPINE BESYLATE 2.5 MG/1
TABLET ORAL
Qty: 30 TABLET | Refills: 1 | Status: SHIPPED | OUTPATIENT
Start: 2020-01-09 | End: 2020-02-03

## 2020-01-23 DIAGNOSIS — E78.5 HYPERLIPIDEMIA, UNSPECIFIED HYPERLIPIDEMIA TYPE: ICD-10-CM

## 2020-01-23 RX ORDER — PRAVASTATIN SODIUM 40 MG
40 TABLET ORAL DAILY
Qty: 90 TABLET | Refills: 0 | Status: SHIPPED | OUTPATIENT
Start: 2020-01-23 | End: 2020-04-15

## 2020-02-01 DIAGNOSIS — I10 ESSENTIAL HYPERTENSION: ICD-10-CM

## 2020-02-03 RX ORDER — AMLODIPINE BESYLATE 2.5 MG/1
TABLET ORAL
Qty: 30 TABLET | Refills: 1 | Status: SHIPPED | OUTPATIENT
Start: 2020-02-03 | End: 2020-03-02

## 2020-02-04 DIAGNOSIS — I10 ESSENTIAL HYPERTENSION: ICD-10-CM

## 2020-02-04 RX ORDER — RAMIPRIL 10 MG/1
CAPSULE ORAL
Qty: 180 CAPSULE | Refills: 1 | Status: SHIPPED | OUTPATIENT
Start: 2020-02-04 | End: 2020-08-03

## 2020-02-04 RX ORDER — HYDROCHLOROTHIAZIDE 25 MG/1
25 TABLET ORAL DAILY
Qty: 90 TABLET | Refills: 1 | Status: SHIPPED | OUTPATIENT
Start: 2020-02-04 | End: 2020-08-02

## 2020-02-21 ENCOUNTER — OFFICE VISIT (OUTPATIENT)
Dept: INTERNAL MEDICINE | Age: 55
End: 2020-02-21

## 2020-02-21 VITALS
HEART RATE: 88 BPM | OXYGEN SATURATION: 99 % | HEIGHT: 65 IN | DIASTOLIC BLOOD PRESSURE: 90 MMHG | TEMPERATURE: 99 F | BODY MASS INDEX: 33.09 KG/M2 | WEIGHT: 198.6 LBS | SYSTOLIC BLOOD PRESSURE: 150 MMHG

## 2020-02-21 DIAGNOSIS — E66.9 OBESITY (BMI 30-39.9): ICD-10-CM

## 2020-02-21 DIAGNOSIS — I10 ESSENTIAL HYPERTENSION: Primary | ICD-10-CM

## 2020-02-21 DIAGNOSIS — E78.01 FAMILIAL HYPERCHOLESTEROLEMIA: ICD-10-CM

## 2020-02-21 DIAGNOSIS — R73.09 ELEVATED GLUCOSE: ICD-10-CM

## 2020-02-21 DIAGNOSIS — I10 WHITE COAT SYNDROME WITH DIAGNOSIS OF HYPERTENSION: ICD-10-CM

## 2020-02-21 PROBLEM — N39.0 UTI (URINARY TRACT INFECTION): Status: RESOLVED | Noted: 2020-02-21 | Resolved: 2020-02-21

## 2020-02-21 LAB
ALBUMIN SERPL-MCNC: 4.6 G/DL (ref 3.5–5.2)
ALBUMIN/GLOB SERPL: 1.6 G/DL
ALP SERPL-CCNC: 80 U/L (ref 39–117)
ALT SERPL-CCNC: 16 U/L (ref 1–33)
AST SERPL-CCNC: 17 U/L (ref 1–32)
BILIRUB SERPL-MCNC: 0.3 MG/DL (ref 0.2–1.2)
BUN SERPL-MCNC: 11 MG/DL (ref 6–20)
BUN/CREAT SERPL: 17.2 (ref 7–25)
CALCIUM SERPL-MCNC: 9.6 MG/DL (ref 8.6–10.5)
CHLORIDE SERPL-SCNC: 96 MMOL/L (ref 98–107)
CHOLEST SERPL-MCNC: 230 MG/DL (ref 0–200)
CHOLEST/HDLC SERPL: 2.3 {RATIO}
CO2 SERPL-SCNC: 29.2 MMOL/L (ref 22–29)
CREAT SERPL-MCNC: 0.64 MG/DL (ref 0.57–1)
GLOBULIN SER CALC-MCNC: 2.9 GM/DL
GLUCOSE SERPL-MCNC: 95 MG/DL (ref 65–99)
HBA1C MFR BLD: 5.7 % (ref 4.8–5.6)
HDLC SERPL-MCNC: 100 MG/DL (ref 40–60)
LDLC SERPL CALC-MCNC: 121 MG/DL (ref 0–100)
POTASSIUM SERPL-SCNC: 4.5 MMOL/L (ref 3.5–5.2)
PROT SERPL-MCNC: 7.5 G/DL (ref 6–8.5)
SODIUM SERPL-SCNC: 135 MMOL/L (ref 136–145)
TRIGL SERPL-MCNC: 47 MG/DL (ref 0–150)
TSH SERPL DL<=0.005 MIU/L-ACNC: 1.86 UIU/ML (ref 0.27–4.2)
VLDLC SERPL CALC-MCNC: 9.4 MG/DL

## 2020-02-21 PROCEDURE — 99214 OFFICE O/P EST MOD 30 MIN: CPT | Performed by: NURSE PRACTITIONER

## 2020-02-21 NOTE — PROGRESS NOTES
[facility] – [place] [facility] MED[facility]  Trina Bui / 54 y.o. / female  02/21/2020      ASSESSMENT & PLAN:    Problem List Items Addressed This Visit        Cardiovascular and Mediastinum    Essential hypertension - Primary    Relevant Medications    amLODIPine (NORVASC) 2.5 MG tablet    hydroCHLOROthiazide (HYDRODIURIL) 25 MG tablet    ramipril (ALTACE) 10 MG capsule    Other Relevant Orders    Comprehensive Metabolic Panel    Hemoglobin A1c    TSH Rfx On Abnormal To Free T4    Hyperlipidemia    Relevant Medications    pravastatin (PRAVACHOL) 40 MG tablet    Other Relevant Orders    Lipid Panel With / Chol / HDL Ratio    White coat syndrome with diagnosis of hypertension    Relevant Medications    amLODIPine (NORVASC) 2.5 MG tablet    hydroCHLOROthiazide (HYDRODIURIL) 25 MG tablet    ramipril (ALTACE) 10 MG capsule       Digestive    Obesity (BMI 30-39.9)    Relevant Medications    naltrexone-bupropion ER (CONTRAVE) 8-90 MG tablet    Other Relevant Orders    TSH Rfx On Abnormal To Free T4       Other    Elevated glucose    Overview     November 14, 2017, 114 mg percent         Relevant Orders    Hemoglobin A1c        Orders Placed This Encounter   Procedures   • Comprehensive Metabolic Panel   • Lipid Panel With / Chol / HDL Ratio   • Hemoglobin A1c   • TSH Rfx On Abnormal To Free T4     New Medications Ordered This Visit   Medications   • naltrexone-bupropion ER (CONTRAVE) 8-90 MG tablet     Sig: Start 1 tablet daily in AM x 7 days, then 1 tab BID x 7 days. Increase to 2 tabs in AM, 1 in PM x 7 days, then 2 tabs BID (maintenance dose).     Dispense:  120 tablet     Refill:  5       Summary/Discussion:    1. Essential hypertension  Blood pressure elevated in office today.  Patient does have some whitecoat hypertension, reports home readings have been good and blood pressure cuff has been calibrated previously.  We will continue current dosages of antihypertensives for now.    Continue lifestyle  modifications for high blood pressure, high cholesterol, and increased weight. Decrease/eliminate soda, caffeine, alcohol and overall caloric intake. Reduce carbohydrates and sweets in diet.  Continue to improve dietary habits with lean proteins, fresh vegetables, fruits, and nuts. Improve aerobic exercise: walking/biking/swimming daily as tolerated, recommend 30 minutes/day at least 5 days/week.     - Comprehensive Metabolic Panel  - Hemoglobin A1c  - TSH Rfx On Abnormal To Free T4    2. White coat syndrome with diagnosis of hypertension      3. Familial hypercholesterolemia    - Lipid Panel With / Chol / HDL Ratio    4. Elevated glucose    - Hemoglobin A1c    5. Obesity (BMI 30-39.9)  Patient would like to pursue pharmacologic management of obesity.  Discussed with patient would not be a candidate for phentermine related to stable issues with hypertension.  Will start patient on Contrave, discussed how to titrate medication up to maintenance dosage.  Discussed with patient that she would need to return to the office for monthly weight checks for a period of a few months and to evaluate for side effects and blood pressure. She is agreeable to treatment.     - naltrexone-bupropion ER (CONTRAVE) 8-90 MG tablet; Start 1 tablet daily in AM x 7 days, then 1 tab BID x 7 days. Increase to 2 tabs in AM, 1 in PM x 7 days, then 2 tabs BID (maintenance dose).  Dispense: 120 tablet; Refill: 5  - TSH Rfx On Abnormal To Free T4        Return in about 1 month (around 3/21/2020) for Recheck (weight loss follow up, HTN) .  ____________________________________________________________________    MEDICATIONS  Current Outpatient Medications   Medication Sig Dispense Refill   • amLODIPine (NORVASC) 2.5 MG tablet TAKE 1 TABLET BY MOUTH EVERY DAY 30 tablet 1   • escitalopram (LEXAPRO) 10 MG tablet Take 1 tablet by mouth Daily. 90 tablet 4   • hydroCHLOROthiazide (HYDRODIURIL) 25 MG tablet Take 1 tablet by mouth Daily. 90 tablet 1   •  "MAGNESIUM CHLORIDE PO Take  by mouth.     • Multiple Minerals-Vitamins (CALCIUM CITRATE PLUS/MAGNESIUM) tablet Take  by mouth.     • pravastatin (PRAVACHOL) 40 MG tablet Take 1 tablet by mouth Daily. PT NEEDS APPT FOR FURTHER REFILLS 90 tablet 0   • ramipril (ALTACE) 10 MG capsule TAKE 2 CAPSULES DAILY 180 capsule 1   • Vitamin D, Cholecalciferol, 1000 units capsule Take  by mouth.     • naltrexone-bupropion ER (CONTRAVE) 8-90 MG tablet Start 1 tablet daily in AM x 7 days, then 1 tab BID x 7 days. Increase to 2 tabs in AM, 1 in PM x 7 days, then 2 tabs BID (maintenance dose). 120 tablet 5     No current facility-administered medications for this visit.        VITALS    Visit Vitals  /90   Pulse 88   Temp 99 °F (37.2 °C)   Ht 165.1 cm (65\")   Wt 90.1 kg (198 lb 9.6 oz)   LMP 03/07/2018 (Exact Date)   SpO2 99%   BMI 33.05 kg/m²       BP Readings from Last 3 Encounters:   02/21/20 150/90   07/18/19 136/84   03/22/19 120/78     Wt Readings from Last 3 Encounters:   02/21/20 90.1 kg (198 lb 9.6 oz)   07/18/19 85.5 kg (188 lb 9.6 oz)   03/22/19 85.3 kg (188 lb)      Body mass index is 33.05 kg/m².    CC:  Main reason(s) for today's visit: Follow-up for hypertension and hyperlipidemia    HPI:   Chronic essential hypertension:  Since prior visit: compliant with medication(s), checks blood pressure regularly, denies significant problems with medication(s) and SBP < 130. Currently taking amlodipine (Norvase), hydrochlorothiazide (HCTZ) and ramipril. She is exercising, but is adherent to low sodium diet. BP readings at home are stable. She denies symptoms of chest pain/pressure, dyspnea, swelling, vision impairment. CVD risk factors include:  HTN, obesity (BMI>=30 kg/m2). Use of agents associated with HTN: no alcohol use . Most recent in-office blood pressure readings:   BP Readings from Last 3 Encounters:   02/21/20 150/90   07/18/19 136/84   03/22/19 120/78       Chronic hyperlipidemia:  Current therapy include no " prior medication.    Most recent labs:   Lab Results   Component Value Date     (H) 03/21/2019    LDL 93 11/13/2017    LDL 77 08/09/2016    HDL 93 (H) 03/21/2019    HDL 83 (H) 11/13/2017    HDL 88 (H) 08/09/2016    TRIG 53 03/21/2019    CHOLHDLRATIO 2.25 03/21/2019        Obesity: Patient complains of obesity. Patient cites health, self-image as reasons for wanting to lose weight.    Current Exercise Habits cardiovascular workout on exercise equipment    Current Eating Habits    Has been following weight watchers since November 2019.  She reports adherence to the diet most of the time. She also has been practicing some intermittent fasting, but continues to have significant issues with losing weight. She also recently joined a gym 2 weeks ago and is going 3-4 times/weekly.     Patient Care Team:  Trina Pruett APRN as PCP - General (Internal Medicine)  Kesha Ellison MD as Consulting Physician (Obstetrics and Gynecology)  ____________________________________________________________________      REVIEW OF SYSTEMS    Review of Systems   Constitutional: Negative for activity change, appetite change and unexpected weight change.   HENT: Negative for tinnitus.    Eyes: Negative for visual disturbance.   Respiratory: Negative for cough, chest tightness and shortness of breath.    Cardiovascular: Negative for chest pain, palpitations and leg swelling.   Neurological: Negative for dizziness, light-headedness and headaches.         PHYSICAL EXAMINATION    Physical Exam   Constitutional: She is oriented to person, place, and time. She appears well-developed and well-nourished. She is cooperative. She does not appear ill. No distress.   Cardiovascular: Normal rate, regular rhythm, S1 normal, S2 normal and normal heart sounds.   No murmur heard.  Pulmonary/Chest: Effort normal and breath sounds normal. She has no decreased breath sounds. She has no wheezes. She has no rhonchi. She has no rales.   Neurological: She  is alert and oriented to person, place, and time.   Skin: Skin is warm, dry and intact.   Psychiatric: Her speech is normal and behavior is normal. Judgment and thought content normal. Her mood appears anxious. Cognition and memory are normal.   Nursing note and vitals reviewed.      REVIEWED DATA:    Labs:   Lab Results   Component Value Date     03/21/2019    K 4.7 03/21/2019    AST 23 03/21/2019    ALT 18 03/21/2019    BUN 17 03/21/2019    CREATININE 0.63 03/21/2019    CREATININE 0.90 11/13/2017    CREATININE 0.77 08/09/2016    EGFRIFNONA 99 03/21/2019    EGFRIFAFRI 120 03/21/2019       Lab Results   Component Value Date    HGBA1C 5.45 03/21/2019    GLUCOSE 98 05/16/2014       Lab Results   Component Value Date     (H) 03/21/2019    LDL 93 11/13/2017    LDL 77 08/09/2016    HDL 93 (H) 03/21/2019    HDL 83 (H) 11/13/2017    HDL 88 (H) 08/09/2016    TRIG 53 03/21/2019    TRIG 95 11/13/2017    TRIG 107 08/09/2016    CHOLHDLRATIO 2.25 03/21/2019       Lab Results   Component Value Date    TSH 2.50 03/21/2019    FREET4 CANCELED 03/21/2019          Lab Results   Component Value Date    WBC 3.68 03/21/2019    HGB 14.2 03/21/2019    HGB 14.8 11/13/2017    HGB 14.5 04/30/2015     03/21/2019       Lab Results   Component Value Date    PROTEIN Negative 11/13/2017    GLUCOSEU Negative 11/13/2017    BLOODU Negative 11/13/2017    NITRITEU Negative 11/13/2017    LEUKOCYTESUR Negative 11/13/2017       Imaging:        Medical Tests:        Summary of old records / correspondence / consultant report:        Request outside records:        ALLERGIES  Allergies   Allergen Reactions   • Sulfa Antibiotics Hives        PFSH:     The following portions of the patient's history were reviewed and updated as appropriate: Allergies / Current Medications / Past Medical History / Surgical History / Social History / Family History    PROBLEM LIST   Patient Active Problem List   Diagnosis   • Essential hypertension   •  Hyperlipidemia   • Obesity (BMI 30-39.9)   • Routine health maintenance   • Elevated glucose   • White coat syndrome with diagnosis of hypertension   • Menopausal hot flushes       PAST MEDICAL HISTORY  Past Medical History:   Diagnosis Date   • Adiposity    • Allergic Sulfa   • Essential hypertension    • Hyperlipidemia    • Obesity (BMI 30-39.9)    • UTI (urinary tract infection)        SURGICAL HISTORY  Past Surgical History:   Procedure Laterality Date   • COLONOSCOPY  2017    Good   • TONSILLECTOMY  1972       SOCIAL HISTORY  Social History     Socioeconomic History   • Marital status:      Spouse name: Not on file   • Number of children: 0   • Years of education: Not on file   • Highest education level: Not on file   Occupational History   • 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[employer]   Tobacco Use   • Smoking status: Never Smoker   • Smokeless tobacco: Never Used   Substance and Sexual Activity   • Alcohol use: Yes     Types: 2 - 4 Glasses of wine per week     Comment: Occasional social drinks   • Drug use: No   • Sexual activity: Yes     Partners: Male     Birth control/protection: OCP   Social History Narrative     March 2016       FAMILY HISTORY  Family History   Problem Relation Age of Onset   • Coronary artery disease Other         MI   • Early death Sister    • ALS Sister    • Hypertension Mother    • Heart disease Mother    • Heart attack Mother    • Breast cancer Neg Hx    • Ovarian cancer Neg Hx    • Uterine cancer Neg Hx    • Colon cancer Neg Hx

## 2020-02-24 ENCOUNTER — TELEPHONE (OUTPATIENT)
Dept: INTERNAL MEDICINE | Age: 55
End: 2020-02-24

## 2020-02-24 NOTE — TELEPHONE ENCOUNTER
Contact patient.     Let her know that her insurance is refusing to pay for Contrave.     If she is agreeable, we can start her on Saxenda for weight loss. This is a once daily injection given under the skin. It is also useful for helping improve elevated blood sugar.     If she would like to try this, we certainly can. She may need to come in for injection teaching appointment if she is not familiar or comfortable with giving herself injection.     Please let me know what she would like to do.

## 2020-02-25 ENCOUNTER — OFFICE VISIT (OUTPATIENT)
Dept: INTERNAL MEDICINE | Age: 55
End: 2020-02-25

## 2020-02-25 VITALS
SYSTOLIC BLOOD PRESSURE: 134 MMHG | DIASTOLIC BLOOD PRESSURE: 90 MMHG | OXYGEN SATURATION: 98 % | HEART RATE: 78 BPM | HEIGHT: 65 IN | WEIGHT: 199 LBS | TEMPERATURE: 98.2 F | BODY MASS INDEX: 33.15 KG/M2

## 2020-02-25 DIAGNOSIS — Z71.89 ENCOUNTER FOR INJECTION EDUCATION: ICD-10-CM

## 2020-02-25 DIAGNOSIS — R73.09 ELEVATED GLUCOSE: ICD-10-CM

## 2020-02-25 DIAGNOSIS — E66.9 OBESITY (BMI 30-39.9): Primary | ICD-10-CM

## 2020-02-25 PROCEDURE — 99213 OFFICE O/P EST LOW 20 MIN: CPT | Performed by: NURSE PRACTITIONER

## 2020-02-25 NOTE — PATIENT INSTRUCTIONS
Liraglutide injection (Weight Management)  What is this medicine?  LIRAGLUTIDE (LIR a GLOO tide) is used to help people lose weight and maintain weight loss. It is used with a reduced-calorie diet and exercise.  This medicine may be used for other purposes; ask your health care provider or pharmacist if you have questions.  COMMON BRAND NAME(S): Zena  What should I tell my health care provider before I take this medicine?  They need to know if you have any of these conditions:  -endocrine tumors (MEN 2) or if someone in your family had these tumors  -gallbladder disease  -high cholesterol  -history of alcohol abuse problem  -history of pancreatitis  -kidney disease or if you are on dialysis  -liver disease  -previous swelling of the tongue, face, or lips with difficulty breathing, difficulty swallowing, hoarseness, or tightening of the throat  -stomach problems  -suicidal thoughts, plans, or attempt; a previous suicide attempt by you or a family member  -thyroid cancer or if someone in your family had thyroid cancer  -an unusual or allergic reaction to liraglutide, other medicines, foods, dyes, or preservatives  -pregnant or trying to get pregnant  -breast-feeding  How should I use this medicine?  This medicine is for injection under the skin of your upper leg, stomach area, or upper arm. You will be taught how to prepare and give this medicine. Use exactly as directed. Take your medicine at regular intervals. Do not take it more often than directed.  It is important that you put your used needles and syringes in a special sharps container. Do not put them in a trash can. If you do not have a sharps container, call your pharmacist or healthcare provider to get one.  A special MedGuide will be given to you by the pharmacist with each prescription and refill. Be sure to read this information carefully each time.  Talk to your pediatrician regarding the use of this medicine in children. Special care may be  needed.  Overdosage: If you think you have taken too much of this medicine contact a poison control center or emergency room at once.  NOTE: This medicine is only for you. Do not share this medicine with others.  What if I miss a dose?  If you miss a dose, take it as soon as you can. If it is almost time for your next dose, take only that dose. Do not take double or extra doses. If you miss your dose for 3 days or more, call your doctor or health care professional to talk about how to restart this medicine.  What may interact with this medicine?  -insulin and other medicines for diabetes  This list may not describe all possible interactions. Give your health care provider a list of all the medicines, herbs, non-prescription drugs, or dietary supplements you use. Also tell them if you smoke, drink alcohol, or use illegal drugs. Some items may interact with your medicine.  What should I watch for while using this medicine?  Visit your doctor or health care professional for regular checks on your progress. This medicine is intended to be used in addition to a healthy diet and appropriate exercise. The best results are achieved this way. Do not increase or in any way change your dose without consulting your doctor or health care professional.  Drink plenty of fluids while taking this medicine. Check with your doctor or health care professional if you get an attack of severe diarrhea, nausea, and vomiting. The loss of too much body fluid can make it dangerous for you to take this medicine.  This medicine may affect blood sugar levels. If you have diabetes, check with your doctor or health care professional before you change your diet or the dose of your diabetic medicine.  Patients and their families should watch out for worsening depression or thoughts of suicide. Also watch out for sudden changes in feelings such as feeling anxious, agitated, panicky, irritable, hostile, aggressive, impulsive, severely restless, overly  excited and hyperactive, or not being able to sleep. If this happens, especially at the beginning of treatment or after a change in dose, call your health care professional.  What side effects may I notice from receiving this medicine?  Side effects that you should report to your doctor or health care professional as soon as possible:  -allergic reactions like skin rash, itching or hives, swelling of the face, lips, or tongue  -breathing problems  -diarrhea that continues or is severe  -lump or swelling on the neck  -severe nausea  -signs and symptoms of infection like fever or chills; cough; sore throat; pain or trouble passing urine  -signs and symptoms of low blood sugar such as feeling anxious, confusion, dizziness, increased hunger, unusually weak or tired, sweating, shakiness, cold, irritable, headache, blurred vision, fast heartbeat, loss of consciousness  -signs and symptoms of kidney injury like trouble passing urine or change in the amount of urine  -trouble swallowing  -unusual stomach upset or pain  -vomiting  Side effects that usually do not require medical attention (report to your doctor or health care professional if they continue or are bothersome):  -constipation  -decreased appetite  -diarrhea  -fatigue  -headache  -nausea  -pain, redness, or irritation at site where injected  -stomach upset  -stuffy or runny nose  This list may not describe all possible side effects. Call your doctor for medical advice about side effects. You may report side effects to FDA at 6-972-FDA-5787.  Where should I keep my medicine?  Keep out of the reach of children.  Store unopened pen in a refrigerator between 2 and 8 degrees C (36 and 46 degrees F). Do not freeze or use if the medicine has been frozen. Protect from light and excessive heat. After you first use the pen, it can be stored at room temperature between 15 and 30 degrees C (59 and 86 degrees F) or in a refrigerator. Throw away your used pen after 30 days or  after the expiration date, whichever comes first.  Do not store your pen with the needle attached. If the needle is left on, medicine may leak from the pen.  NOTE: This sheet is a summary. It may not cover all possible information. If you have questions about this medicine, talk to your doctor, pharmacist, or health care provider.  © 2020 [employer]/Gold [employer] (2020-01-24 17:10:35)

## 2020-02-25 NOTE — PROGRESS NOTES
[facility] – [place] [facility]NAL MEDICINE  Trina Bui / 54 y.o. / female  02/25/2020      ASSESSMENT & PLAN:    Problem List Items Addressed This Visit        Digestive    Obesity (BMI 30-39.9) - Primary    Relevant Medications    Liraglutide -Weight Management 18 MG/3ML solution pen-injector       Other    Elevated glucose    Overview     November 14, 2017, 114 mg percent         Relevant Medications    Liraglutide -Weight Management 18 MG/3ML solution pen-injector      Other Visit Diagnoses     Encounter for injection education            No orders of the defined types were placed in this encounter.    New Medications Ordered This Visit   Medications   • Liraglutide -Weight Management 18 MG/3ML solution pen-injector     Sig: Give 0.6mg SC daily x 1 week, then increase to 1.2mg SC daily.     Dispense:  3 mL     Refill:  11       Summary/Discussion:    1. Obesity (BMI 30-39.9)  Discussed MOA of medication with patient, possible side effects, proper storage and handling. Discussed and demonstrated proper injection technique and usage of medication.   Discussed titration/dosage adjustment of medication to desired dosage. Patient expresses understanding.     Will have her follow up as scheduled in one month for weight check, medication check. Titrate dosage as needed up to 3mg/day maximum.     - Liraglutide -Weight Management 18 MG/3ML solution pen-injector; Give 0.6mg SC daily x 1 week, then increase to 1.2mg SC daily.  Dispense: 3 mL; Refill: 11    2. Encounter for injection education      3. Elevated glucose    - Liraglutide -Weight Management 18 MG/3ML solution pen-injector; Give 0.6mg SC daily x 1 week, then increase to 1.2mg SC daily.  Dispense: 3 mL; Refill: 11        No follow-ups on file.    ____________________________________________________________________    MEDICATIONS  Current Outpatient Medications   Medication Sig Dispense Refill   • amLODIPine (NORVASC) 2.5 MG tablet TAKE 1 TABLET BY MOUTH  "EVERY DAY 30 tablet 1   • escitalopram (LEXAPRO) 10 MG tablet Take 1 tablet by mouth Daily. 90 tablet 4   • hydroCHLOROthiazide (HYDRODIURIL) 25 MG tablet Take 1 tablet by mouth Daily. 90 tablet 1   • MAGNESIUM CHLORIDE PO Take  by mouth.     • Multiple Minerals-Vitamins (CALCIUM CITRATE PLUS/MAGNESIUM) tablet Take  by mouth.     • pravastatin (PRAVACHOL) 40 MG tablet Take 1 tablet by mouth Daily. PT NEEDS APPT FOR FURTHER REFILLS 90 tablet 0   • ramipril (ALTACE) 10 MG capsule TAKE 2 CAPSULES DAILY 180 capsule 1   • Vitamin D, Cholecalciferol, 1000 units capsule Take  by mouth.     • Liraglutide -Weight Management 18 MG/3ML solution pen-injector Give 0.6mg SC daily x 1 week, then increase to 1.2mg SC daily. 3 mL 11     No current facility-administered medications for this visit.           VITALS:    Visit Vitals  /90   Pulse 78   Temp 98.2 °F (36.8 °C)   Ht 165.1 cm (65\")   Wt 90.3 kg (199 lb)   LMP 03/07/2018 (Exact Date)   SpO2 98%   BMI 33.12 kg/m²       BP Readings from Last 3 Encounters:   02/25/20 134/90   02/21/20 150/90   07/18/19 136/84     Wt Readings from Last 3 Encounters:   02/25/20 90.3 kg (199 lb)   02/21/20 90.1 kg (198 lb 9.6 oz)   07/18/19 85.5 kg (188 lb 9.6 oz)      Body mass index is 33.12 kg/m².    CC:  Main reason(s) for today's visit: Obesity and Patient Education      HPI:     Patient here for injection teaching for Saxenda for obesity.   She was seen in office last week and we discussed possible pharmacologic therapy for obesity treatment.  At that time, I felt that she was a good candidate for Contrave, although her insurance would not cover this.  Saxenda has been selected as an alternative and patient is agreeable to injection administration and teaching.     Most recent HgbA1c at 5.7%.     No previous history pancreatitis, GI issues/ motility issues. No known family history MEN.     Patient Care Team:  Trina Pruett APRN as PCP - General (Internal Medicine)  Kesha Ellison, " MD as Consulting Physician (Obstetrics and Gynecology)    ____________________________________________________________________    REVIEW OF SYSTEMS    Review of Systems   Constitutional: Negative for activity change, appetite change and unexpected weight change.   HENT: Negative for tinnitus.    Eyes: Negative for visual disturbance.   Respiratory: Negative for cough, chest tightness and shortness of breath.    Cardiovascular: Negative for chest pain, palpitations and leg swelling.   Neurological: Negative for dizziness, light-headedness and headaches.         PHYSICAL EXAMINATION    Physical Exam   Constitutional: She is oriented to person, place, and time. Vital signs are normal. She appears well-developed and well-nourished. She is cooperative.   Neurological: She is alert and oriented to person, place, and time. She is not disoriented.   Psychiatric: She has a normal mood and affect. Her speech is normal and behavior is normal. Judgment and thought content normal. She is not actively hallucinating. Cognition and memory are normal. She is attentive.   Nursing note and vitals reviewed.      REVIEWED DATA:    Labs:     Lab Results   Component Value Date     (L) 02/21/2020    K 4.5 02/21/2020    AST 17 02/21/2020    ALT 16 02/21/2020    BUN 11 02/21/2020    CREATININE 0.64 02/21/2020    CREATININE 0.63 03/21/2019    CREATININE 0.90 11/13/2017    EGFRIFNONA 97 02/21/2020    EGFRIFAFRI 118 02/21/2020       Lab Results   Component Value Date    HGBA1C 5.70 (H) 02/21/2020    HGBA1C 5.45 03/21/2019    GLUCOSE 98 05/16/2014       Lab Results   Component Value Date     (H) 02/21/2020     (H) 03/21/2019    LDL 93 11/13/2017     (H) 02/21/2020    HDL 93 (H) 03/21/2019    HDL 83 (H) 11/13/2017    TRIG 47 02/21/2020    TRIG 53 03/21/2019    TRIG 95 11/13/2017    CHOLHDLRATIO 2.30 02/21/2020    CHOLHDLRATIO 2.25 03/21/2019       Lab Results   Component Value Date    TSH 1.860 02/21/2020    FREET4  CANCELED 03/21/2019       Lab Results   Component Value Date    WBC 3.68 03/21/2019    HGB 14.2 03/21/2019    HGB 14.8 11/13/2017    HGB 14.5 04/30/2015     03/21/2019       Lab Results   Component Value Date    PROTEIN Negative 11/13/2017    GLUCOSEU Negative 11/13/2017    BLOODU Negative 11/13/2017    NITRITEU Negative 11/13/2017    LEUKOCYTESUR Negative 11/13/2017       Imaging:         Medical Tests:         Summary of old records / correspondence / consultant report:         Request outside records:         ALLERGIES  Allergies   Allergen Reactions   • Sulfa Antibiotics Hives        PFSH:     The following portions of the patient's history were reviewed and updated as appropriate: Allergies / Current Medications / Past Medical History / Surgical History / Social History / Family History    PROBLEM LIST   Patient Active Problem List   Diagnosis   • Essential hypertension   • Hyperlipidemia   • Obesity (BMI 30-39.9)   • Routine health maintenance   • Elevated glucose   • White coat syndrome with diagnosis of hypertension   • Menopausal hot flushes       PAST MEDICAL HISTORY  Past Medical History:   Diagnosis Date   • Adiposity    • Allergic Sulfa   • Essential hypertension    • Hyperlipidemia    • Obesity (BMI 30-39.9)    • UTI (urinary tract infection)        SURGICAL HISTORY  Past Surgical History:   Procedure Laterality Date   • COLONOSCOPY  2017    Good   • TONSILLECTOMY  1972       SOCIAL HISTORY  Social History     Socioeconomic History   • Marital status:      Spouse name: Not on file   • Number of children: 0   • Years of education: Not on file   • Highest education level: Not on file   Occupational History   • 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[employer]   Tobacco Use   • Smoking status: Never Smoker   • Smokeless tobacco: Never Used   Substance and Sexual Activity   • Alcohol use: Yes     Types: 2 - 4 Glasses of wine per week     Comment: Occasional social drinks   • Drug use: No   • Sexual activity: Yes      Partners: Male     Birth control/protection: OCP   Social History Narrative     March 2016       FAMILY HISTORY  Family History   Problem Relation Age of Onset   • Coronary artery disease Other         MI   • Early death Sister    • ALS Sister    • Hypertension Mother    • Heart disease Mother    • Heart attack Mother    • Breast cancer Neg Hx    • Ovarian cancer Neg Hx    • Uterine cancer Neg Hx    • Colon cancer Neg Hx          **[place] Disclaimer:   Much of this encounter note is an electronic transcription/translation of spoken language to printed text. The electronic translation of spoken language may permit erroneous, or at times, nonsensical words or phrases to be inadvertently transcribed. Although I have reviewed the note for such errors, some may still exist.

## 2020-02-29 DIAGNOSIS — I10 ESSENTIAL HYPERTENSION: ICD-10-CM

## 2020-02-29 RX ORDER — ESCITALOPRAM OXALATE 10 MG/1
TABLET ORAL
Qty: 90 TABLET | Refills: 1 | Status: SHIPPED | OUTPATIENT
Start: 2020-02-29 | End: 2020-09-11

## 2020-03-02 RX ORDER — AMLODIPINE BESYLATE 2.5 MG/1
TABLET ORAL
Qty: 30 TABLET | Refills: 5 | Status: SHIPPED | OUTPATIENT
Start: 2020-03-02 | End: 2020-07-07 | Stop reason: DRUGHIGH

## 2020-03-18 ENCOUNTER — TELEPHONE (OUTPATIENT)
Dept: INTERNAL MEDICINE | Age: 55
End: 2020-03-18

## 2020-03-18 NOTE — TELEPHONE ENCOUNTER
----- Message from Niesha Bui sent at 3/18/2020  9:28 AM EDT -----  Regarding: Non-Urgent Medical Question  Contact: [phone]  Michael Mena.  I have been giving myself the daily injections with no issue until this Past Sunday.  I am now having a red half dollar size itchy spots around my belly where I have been alternating the shots.  I did not take the shot this morning due to this reaction.   What do you suggest?

## 2020-03-19 NOTE — TELEPHONE ENCOUNTER
Contact patient.       I would suggest holding off on the injections for a few days (until the spots resolve) and then restart again.     If this continues, we may have to stop the medication.     Update me via [employer] in a week or so.

## 2020-04-14 ENCOUNTER — TELEMEDICINE (OUTPATIENT)
Dept: INTERNAL MEDICINE | Age: 55
End: 2020-04-14

## 2020-04-14 VITALS — SYSTOLIC BLOOD PRESSURE: 128 MMHG | WEIGHT: 196 LBS | DIASTOLIC BLOOD PRESSURE: 78 MMHG | BODY MASS INDEX: 32.62 KG/M2

## 2020-04-14 DIAGNOSIS — T88.7XXA HYPERSENSITIVITY REACTION AT INJECTION SITE: ICD-10-CM

## 2020-04-14 DIAGNOSIS — I10 ESSENTIAL HYPERTENSION: ICD-10-CM

## 2020-04-14 DIAGNOSIS — T80.89XA HYPERSENSITIVITY REACTION AT INJECTION SITE: ICD-10-CM

## 2020-04-14 DIAGNOSIS — E66.9 OBESITY (BMI 30-39.9): Primary | ICD-10-CM

## 2020-04-14 PROCEDURE — 99214 OFFICE O/P EST MOD 30 MIN: CPT | Performed by: NURSE PRACTITIONER

## 2020-04-14 NOTE — PROGRESS NOTES
[facility] INTERNAL MEDICINE  Trina Bui / 54 y.o. / female  04/14/2020      ASSESSMENT & PLAN:    Problem List Items Addressed This Visit        Cardiovascular and Mediastinum    Essential hypertension    Relevant Medications    hydroCHLOROthiazide (HYDRODIURIL) 25 MG tablet    ramipril (ALTACE) 10 MG capsule    amLODIPine (NORVASC) 2.5 MG tablet       Digestive    Obesity (BMI 30-39.9) - Primary    Relevant Medications    naltrexone-bupropion ER (CONTRAVE) 8-90 MG tablet      Other Visit Diagnoses     Hypersensitivity reaction at injection site        Relevant Medications    naltrexone-bupropion ER (CONTRAVE) 8-90 MG tablet        No orders of the defined types were placed in this encounter.    New Medications Ordered This Visit   Medications   • naltrexone-bupropion ER (CONTRAVE) 8-90 MG tablet     Sig: Wk 1: 1 tab daily, Wk 2: 1 tab twice a day, Wk 3: 2 tabs in AM, 1 tab in PM, Wk 4: 2 tabs twice a day, Maintenance dose: 2 tabs twice daily.     Dispense:  120 tablet     Refill:  5       Summary/Discussion:    This was an audio and video enabled telemedicine encounter. Patient had difficulties using Zoom platform, visit enabled through [employer] .     · I did spend 20 minutes in face-to-face video interaction with patient, greater than 50% spent in counseling.        1. Obesity (BMI 30-39.9)  D/c Saxenda r/t localized allergic reaction.   Start Contrave as previously discussed, will need PA completed.   Will follow up for medication f/u and weight check via video visit in one month.     - naltrexone-bupropion ER (CONTRAVE) 8-90 MG tablet; Wk 1: 1 tab daily, Wk 2: 1 tab twice a day, Wk 3: 2 tabs in AM, 1 tab in PM, Wk 4: 2 tabs twice a day, Maintenance dose: 2 tabs twice daily.  Dispense: 120 tablet; Refill: 5    2. Essential hypertension  Blood pressure stable on current therapy, continue same.    Continue lifestyle modifications for high blood pressure, high cholesterol, and increased  weight. Decrease/eliminate soda, caffeine, alcohol and overall caloric intake. Reduce carbohydrates and sweets in diet.  Continue to improve dietary habits with lean proteins, fresh vegetables, fruits, and nuts. Improve aerobic exercise: walking/biking/swimming daily as tolerated, recommend 30 minutes/day at least 5 days/week.       3. Hypersensitivity reaction at injection site    - naltrexone-bupropion ER (CONTRAVE) 8-90 MG tablet; Wk 1: 1 tab daily, Wk 2: 1 tab twice a day, Wk 3: 2 tabs in AM, 1 tab in PM, Wk 4: 2 tabs twice a day, Maintenance dose: 2 tabs twice daily.  Dispense: 120 tablet; Refill: 5          No follow-ups on file.  ____________________________________________________________________    MEDICATIONS  Current Outpatient Medications   Medication Sig Dispense Refill   • amLODIPine (NORVASC) 2.5 MG tablet TAKE 1 TABLET BY MOUTH EVERY DAY 30 tablet 5   • escitalopram (LEXAPRO) 10 MG tablet TAKE 1 TABLET DAILY 90 tablet 1   • hydroCHLOROthiazide (HYDRODIURIL) 25 MG tablet Take 1 tablet by mouth Daily. 90 tablet 1   • MAGNESIUM CHLORIDE PO Take  by mouth.     • Multiple Minerals-Vitamins (CALCIUM CITRATE PLUS/MAGNESIUM) tablet Take  by mouth.     • pravastatin (PRAVACHOL) 40 MG tablet Take 1 tablet by mouth Daily. PT NEEDS APPT FOR FURTHER REFILLS 90 tablet 0   • ramipril (ALTACE) 10 MG capsule TAKE 2 CAPSULES DAILY 180 capsule 1   • Vitamin D, Cholecalciferol, 1000 units capsule Take  by mouth.     • naltrexone-bupropion ER (CONTRAVE) 8-90 MG tablet Wk 1: 1 tab daily, Wk 2: 1 tab twice a day, Wk 3: 2 tabs in AM, 1 tab in PM, Wk 4: 2 tabs twice a day, Maintenance dose: 2 tabs twice daily. 120 tablet 5     No current facility-administered medications for this visit.        VITALS    Visit Vitals  /78   Wt 88.9 kg (196 lb)   LMP 03/07/2018 (Exact Date)   BMI 32.62 kg/m²       BP Readings from Last 3 Encounters:   04/14/20 128/78   02/25/20 134/90   02/21/20 150/90     Wt Readings from Last 3  Encounters:   04/14/20 88.9 kg (196 lb)   02/25/20 90.3 kg (199 lb)   02/21/20 90.1 kg (198 lb 9.6 oz)      Body mass index is 32.62 kg/m².    CC:  Main reason(s) for today's visit: Follow-up for hypertension and obesity     HPI:   She was started on Saxenda at last visit for obesity treatment. She reports did well with the first week dose of 0.6mg, lost 3 lbs. However, she then started having large hives at the injections sites. She stopped the medication x 1 week, and started injections again at 0.6mg in her leg and started having hives again. She has subsequently stopped the medication.     Chronic essential hypertension:  Since prior visit: compliant with medication(s), checks blood pressure regularly, denies significant problems with medication(s) and SBP < 130. Currently taking amlodipine (Norvase), hydrochlorothiazide (HCTZ) and ramipril. She is not exercising, but is adherent to low sodium diet. BP readings at home are stable. She denies symptoms of chest pain/pressure, dyspnea, swelling, vision impairment. CVD risk factors include:dyslipidemia, HTN, obesity (BMI>=30 kg/m2), and sedentary lifestyle. Use of agents associated with HTN: no alcohol use and no tobacco use . Most recent in-office blood pressure readings:   BP Readings from Last 3 Encounters:   04/14/20 128/78   02/25/20 134/90   02/21/20 150/90       Chronic hyperlipidemia:  Current therapy include pravastatin.    Most recent labs:   Lab Results   Component Value Date     (H) 02/21/2020     (H) 03/21/2019    LDL 93 11/13/2017     (H) 02/21/2020    HDL 93 (H) 03/21/2019    HDL 83 (H) 11/13/2017    TRIG 47 02/21/2020    CHOLHDLRATIO 2.30 02/21/2020    CHOLHDLRATIO 2.25 03/21/2019         Patient Care Team:  Trina Pruett APRN as PCP - General (Internal Medicine)  Kesha Ellison MD as Consulting Physician (Obstetrics and Gynecology)  ____________________________________________________________________      REVIEW OF  SYSTEMS    Review of Systems   Constitutional: Negative for activity change, appetite change and unexpected weight change.   HENT: Negative for tinnitus.    Eyes: Negative for visual disturbance.   Respiratory: Negative for cough, chest tightness and shortness of breath.    Cardiovascular: Negative for chest pain, palpitations and leg swelling.   Neurological: Negative for dizziness, light-headedness and headaches.         PHYSICAL EXAMINATION    Physical Exam   Constitutional: She is oriented to person, place, and time. Vital signs are normal. She appears well-developed and well-nourished. She is cooperative.   Neurological: She is alert and oriented to person, place, and time. She is not disoriented.   Psychiatric: She has a normal mood and affect. Her speech is normal and behavior is normal. Judgment and thought content normal. She is not actively hallucinating. Cognition and memory are normal. She is attentive.   Vitals reviewed.      REVIEWED DATA:    Labs:   Lab Results   Component Value Date     (L) 02/21/2020    K 4.5 02/21/2020    AST 17 02/21/2020    ALT 16 02/21/2020    BUN 11 02/21/2020    CREATININE 0.64 02/21/2020    CREATININE 0.63 03/21/2019    CREATININE 0.90 11/13/2017    EGFRIFNONA 97 02/21/2020    EGFRIFAFRI 118 02/21/2020       Lab Results   Component Value Date    HGBA1C 5.70 (H) 02/21/2020    HGBA1C 5.45 03/21/2019    GLUCOSE 98 05/16/2014       Lab Results   Component Value Date     (H) 02/21/2020     (H) 03/21/2019    LDL 93 11/13/2017     (H) 02/21/2020    HDL 93 (H) 03/21/2019    HDL 83 (H) 11/13/2017    TRIG 47 02/21/2020    TRIG 53 03/21/2019    TRIG 95 11/13/2017    CHOLHDLRATIO 2.30 02/21/2020    CHOLHDLRATIO 2.25 03/21/2019       Lab Results   Component Value Date    TSH 1.860 02/21/2020    FREET4 CANCELED 03/21/2019          Lab Results   Component Value Date    WBC 3.68 03/21/2019    HGB 14.2 03/21/2019    HGB 14.8 11/13/2017    HGB 14.5 04/30/2015    PLT  263 03/21/2019       Lab Results   Component Value Date    PROTEIN Negative 11/13/2017    GLUCOSEU Negative 11/13/2017    BLOODU Negative 11/13/2017    NITRITEU Negative 11/13/2017    LEUKOCYTESUR Negative 11/13/2017       Imaging:        Medical Tests:        Summary of old records / correspondence / consultant report:        Request outside records:        ALLERGIES  Allergies   Allergen Reactions   • Sulfa Antibiotics Hives        PFSH:     The following portions of the patient's history were reviewed and updated as appropriate: Allergies / Current Medications / Past Medical History / Surgical History / Social History / Family History    PROBLEM LIST   Patient Active Problem List   Diagnosis   • Essential hypertension   • Hyperlipidemia   • Obesity (BMI 30-39.9)   • Routine health maintenance   • Elevated glucose   • White coat syndrome with diagnosis of hypertension   • Menopausal hot flushes       PAST MEDICAL HISTORY  Past Medical History:   Diagnosis Date   • Adiposity    • Allergic Sulfa   • Essential hypertension    • Hyperlipidemia    • Obesity (BMI 30-39.9)    • UTI (urinary tract infection)        SURGICAL HISTORY  Past Surgical History:   Procedure Laterality Date   • COLONOSCOPY  2017    Good   • TONSILLECTOMY  1972       SOCIAL HISTORY  Social History     Socioeconomic History   • Marital status:      Spouse name: Not on file   • Number of children: 0   • Years of education: Not on file   • Highest education level: Not on file   Occupational History   • 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[employer]   Tobacco Use   • Smoking status: Never Smoker   • Smokeless tobacco: Never Used   Substance and Sexual Activity   • Alcohol use: Yes     Types: 2 - 4 Glasses of wine per week     Comment: Occasional social drinks   • Drug use: No   • Sexual activity: Yes     Partners: Male     Birth control/protection: OCP   Social History Narrative     March 2016       FAMILY HISTORY  Family History   Problem Relation Age of  Onset   • Coronary artery disease Other         MI   • Early death Sister    • ALS Sister    • Hypertension Mother    • Heart disease Mother    • Heart attack Mother    • Breast cancer Neg Hx    • Ovarian cancer Neg Hx    • Uterine cancer Neg Hx    • Colon cancer Neg Hx

## 2020-04-14 NOTE — PATIENT INSTRUCTIONS
Bupropion; Naltrexone extended-release tablets  What is this medicine?  BUPROPION; NALTREXONE (byoo PROE pee on; nal TREX one) is a combination product used to promote and maintain weight loss in obese adults or overweight adults who also have weight related medical problems. This medicine should be used with a reduced calorie diet and increased physical activity.  This medicine may be used for other purposes; ask your health care provider or pharmacist if you have questions.  COMMON BRAND NAME(S): Contrave  What should I tell my health care provider before I take this medicine?  They need to know if you have any of these conditions:  -an eating disorder, such as anorexia or bulimia  -bipolar disorder  -diabetes  -depression  -drug abuse or addiction  -glaucoma  -head injury  -heart disease  -high blood pressure  -history of a tumor or infection of your brain or spine  -history of stroke  -history of irregular heartbeat  -if you often drink alcohol  -kidney disease  -liver disease  -schizophrenia  -seizures  -suicidal thoughts, plans, or attempt; a previous suicide attempt by you or a family member  -an unusual or allergic reaction to bupropion, naltrexone, other medicines, foods, dyes, or preservatives  -breast-feeding  -pregnant or trying to become pregnant  How should I use this medicine?  Take this medicine by mouth with a glass of water. Follow the directions on the prescription label. Take this medicine in the morning and in the evenings as directed by your healthcare professional. You can take it with or without food. Do not take with high-fat meals as this may increase your risk of seizures. Do not crush, chew, or cut these tablets. Do not take your medicine more often than directed. Do not stop taking this medicine suddenly except upon the advice of your doctor.  A special MedGuide will be given to you by the pharmacist with each prescription and refill. Be sure to read this information carefully each  time.  Talk to your pediatrician regarding the use of this medicine in children. Special care may be needed.  Overdosage: If you think you have taken too much of this medicine contact a poison control center or emergency room at once.  NOTE: This medicine is only for you. Do not share this medicine with others.  What if I miss a dose?  If you miss a dose, skip the missed dose and take your next tablet at the regular time. Do not take double or extra doses.  What may interact with this medicine?  Do not take this medicine with any of the following medications:  -any prescription or street opioid drug like codeine, heroin, methadone  -linezolid  -MAOIs like Carbex, Eldepryl, Marplan, Nardil, and Parnate  -methylene blue (injected into a vein)  -other medicines that contain bupropion like Zyban or Wellbutrin  This medicine may also interact with the following medications:  -alcohol  -certain medicines for anxiety or sleep  -certain medicines for blood pressure like metoprolol, propranolol  -certain medicines for depression or psychotic disturbances  -certain medicines for HIV or AIDS like efavirenz, lopinavir, nelfinavir, ritonavir  -certain medicines for irregular heart beat like propafenone, flecainide  -certain medicines for Parkinson's disease like amantadine, levodopa  -certain medicines for seizures like carbamazepine, phenytoin, phenobarbital  -cimetidine  -clopidogrel  -cyclophosphamide  -digoxin  -disulfiram  -furazolidone  -isoniazid  -nicotine  -orphenadrine  -procarbazine  -steroid medicines like prednisone or cortisone  -stimulant medicines for attention disorders, weight loss, or to stay awake  -tamoxifen  -theophylline  -thioridazine  -thiotepa  -ticlopidine  -tramadol  -warfarin  This list may not describe all possible interactions. Give your health care provider a list of all the medicines, herbs, non-prescription drugs, or dietary supplements you use. Also tell them if you smoke, drink alcohol, or use  illegal drugs. Some items may interact with your medicine.  What should I watch for while using this medicine?  This medicine is intended to be used in addition to a healthy diet and appropriate exercise. The best results are achieved this way. Do not increase or in any way change your dose without consulting your doctor or health care professional. Do not take this medicine with other prescription or over-the-counter weight loss products without consulting your doctor or health care professional. Your doctor should tell you to stop taking this medicine if you do not lose a certain amount of weight within the first 12 weeks of treatment.  Visit your doctor or health care professional for regular checkups. Your doctor may order blood tests or other tests to see how you are doing.  This medicine may affect blood sugar levels. If you have diabetes, check with your doctor or health care professional before you change your diet or the dose of your diabetic medicine.  Patients and their families should watch out for new or worsening depression or thoughts of suicide. Also watch out for sudden changes in feelings such as feeling anxious, agitated, panicky, irritable, hostile, aggressive, impulsive, severely restless, overly excited and hyperactive, or not being able to sleep. If this happens, especially at the beginning of treatment or after a change in dose, call your health care professional.  Avoid alcoholic drinks while taking this medicine. Drinking large amounts of alcoholic beverages, using sleeping or anxiety medicines, or quickly stopping the use of these agents while taking this medicine may increase your risk for a seizure.  What side effects may I notice from receiving this medicine?  Side effects that you should report to your doctor or health care professional as soon as possible:  -allergic reactions like skin rash, itching or hives, swelling of the face, lips, or tongue  -breathing problems  -changes in  vision  -confusion  -elevated mood, decreased need for sleep, racing thoughts, impulsive behavior  -fast or irregular heartbeat  -hallucinations, loss of contact with reality  -increased blood pressure  -redness, blistering, peeling or loosening of the skin, including inside the mouth  -seizures  -signs and symptoms of liver injury like dark yellow or brown urine; general ill feeling or flu-like symptoms; light-colored stools; loss of appetite; nausea; right upper belly pain; unusually weak or tired; yellowing of the eyes or skin  -suicidal thoughts or other mood changes  -vomiting  Side effects that usually do not require medical attention (report to your doctor or health care professional if they continue or are bothersome):  -constipation  -headache  -loss of appetite  -indigestion, stomach upset  -tremors  This list may not describe all possible side effects. Call your doctor for medical advice about side effects. You may report side effects to FDA at 9-845-FDA-2330.  Where should I keep my medicine?  Keep out of the reach of children.  Store at room temperature between 15 and 30 degrees C (59 and 86 degrees F). Throw away any unused medicine after the expiration date.  NOTE: This sheet is a summary. It may not cover all possible information. If you have questions about this medicine, talk to your doctor, pharmacist, or health care provider.  © 2020 [employer] (2017-06-09 13:42:58)

## 2020-04-15 DIAGNOSIS — E78.5 HYPERLIPIDEMIA, UNSPECIFIED HYPERLIPIDEMIA TYPE: ICD-10-CM

## 2020-04-15 RX ORDER — PRAVASTATIN SODIUM 40 MG
40 TABLET ORAL DAILY
Qty: 90 TABLET | Refills: 1 | Status: SHIPPED | OUTPATIENT
Start: 2020-04-15 | End: 2020-10-20

## 2020-05-21 ENCOUNTER — TELEMEDICINE (OUTPATIENT)
Dept: INTERNAL MEDICINE | Age: 55
End: 2020-05-21

## 2020-05-21 VITALS — DIASTOLIC BLOOD PRESSURE: 86 MMHG | WEIGHT: 194 LBS | BODY MASS INDEX: 32.28 KG/M2 | SYSTOLIC BLOOD PRESSURE: 137 MMHG

## 2020-05-21 DIAGNOSIS — I10 ESSENTIAL HYPERTENSION: ICD-10-CM

## 2020-05-21 DIAGNOSIS — E66.9 OBESITY (BMI 30-39.9): Primary | ICD-10-CM

## 2020-05-21 PROCEDURE — 99214 OFFICE O/P EST MOD 30 MIN: CPT | Performed by: NURSE PRACTITIONER

## 2020-05-21 NOTE — PROGRESS NOTES
[facility] [place] INTERNAL MEDICINE  Trina Bui / 54 y.o. / female  05/21/2020      ASSESSMENT & PLAN:    Problem List Items Addressed This Visit        Cardiovascular and Mediastinum    Essential hypertension    Relevant Medications    hydroCHLOROthiazide (HYDRODIURIL) 25 MG tablet    ramipril (ALTACE) 10 MG capsule    amLODIPine (NORVASC) 2.5 MG tablet       Digestive    Obesity (BMI 30-39.9) - Primary    Relevant Medications    naltrexone-bupropion ER (CONTRAVE) 8-90 MG tablet        No orders of the defined types were placed in this encounter.    No orders of the defined types were placed in this encounter.      Summary/Discussion:    1. Obesity (BMI 30-39.9)  Patient has lost approximately 8 pounds from starting weight of 202 in the past month.  She has been on the contrary for approximately 1 month, and maximum dosage for 2 weeks.  She denies any current adverse side effects to the medication.  We will continue current dose for now, encouraged increased activity, monitoring diet and reduction of high fat and high carb foods.   Follow up in office 6 weeks for weight and BP check.     2. Essential hypertension  BP borderline high, but better than it has been. BP is tolerating Contrave. Continue current therapy, continue to monitor BP. BP should continue to improve with further weight loss.     Continue lifestyle modifications for high blood pressure, high cholesterol, and increased weight. Decrease/eliminate soda, caffeine, alcohol and overall caloric intake. Reduce carbohydrates and sweets in diet.  Continue to improve dietary habits with lean proteins, fresh vegetables, fruits, and nuts. Improve aerobic exercise: walking/biking/swimming daily as tolerated, recommend 30 minutes/day at least 5 days/week.           Return in about 6 weeks (around 7/2/2020) for Next scheduled follow up.    ____________________________________________________________________    MEDICATIONS  Current Outpatient  Medications   Medication Sig Dispense Refill   • amLODIPine (NORVASC) 2.5 MG tablet TAKE 1 TABLET BY MOUTH EVERY DAY 30 tablet 5   • escitalopram (LEXAPRO) 10 MG tablet TAKE 1 TABLET DAILY 90 tablet 1   • hydroCHLOROthiazide (HYDRODIURIL) 25 MG tablet Take 1 tablet by mouth Daily. 90 tablet 1   • Multiple Minerals-Vitamins (CALCIUM CITRATE PLUS/MAGNESIUM) tablet Take  by mouth.     • naltrexone-bupropion ER (CONTRAVE) 8-90 MG tablet Wk 1: 1 tab daily, Wk 2: 1 tab twice a day, Wk 3: 2 tabs in AM, 1 tab in PM, Wk 4: 2 tabs twice a day, Maintenance dose: 2 tabs twice daily. 120 tablet 5   • pravastatin (PRAVACHOL) 40 MG tablet TAKE 1 TABLET BY MOUTH DAILY. PT NEEDS APPT FOR FURTHER REFILLS 90 tablet 1   • ramipril (ALTACE) 10 MG capsule TAKE 2 CAPSULES DAILY 180 capsule 1   • Vitamin D, Cholecalciferol, 1000 units capsule Take  by mouth.       No current facility-administered medications for this visit.           VITALS:    Visit Vitals  /86   Wt 88 kg (194 lb)   LMP 03/07/2018 (Exact Date)   BMI 32.28 kg/m²       BP Readings from Last 3 Encounters:   05/21/20 137/86   04/14/20 128/78   02/25/20 134/90     Wt Readings from Last 3 Encounters:   05/21/20 88 kg (194 lb)   04/14/20 88.9 kg (196 lb)   02/25/20 90.3 kg (199 lb)      Body mass index is 32.28 kg/m².    CC:  Main reason(s) for today's visit: Obesity and Hypertension      HPI:     Patient here for follow up obesity. Started Contrave about 4 weeks ago, has been on the maintenance dosage for 2 weeks now.  Denies any current adverse side effects, although she did experience some increased anxiety when first starting the medication, this is since subsided.  She has lost approximately 8 pounds since starting the medication on April 13. Reports appetite decreased.     BP has been consistently 120-130s/ 70-80s. Denies any palpitations, chest pain.     Patient Care Team:  Trina Pruett APRN as PCP - General (Internal Medicine)  Kesha Ellison MD as Consulting  Physician (Obstetrics and Gynecology)    ____________________________________________________________________    REVIEW OF SYSTEMS    Review of Systems   Constitutional: Negative for activity change, appetite change and unexpected weight change.   HENT: Negative for tinnitus.    Eyes: Negative for visual disturbance.   Respiratory: Negative for cough, chest tightness and shortness of breath.    Cardiovascular: Negative for chest pain, palpitations and leg swelling.   Neurological: Negative for dizziness, light-headedness and headaches.         PHYSICAL EXAMINATION    Physical Exam   Constitutional: She is oriented to person, place, and time. Vital signs are normal. She appears well-developed and well-nourished. She is cooperative.   Neurological: She is alert and oriented to person, place, and time. She is not disoriented.   Psychiatric: She has a normal mood and affect. Her speech is normal and behavior is normal. Judgment and thought content normal. She is not actively hallucinating. Cognition and memory are normal. She is attentive.   Vitals reviewed.      REVIEWED DATA:    Labs:     Lab Results   Component Value Date     (L) 02/21/2020    K 4.5 02/21/2020    AST 17 02/21/2020    ALT 16 02/21/2020    BUN 11 02/21/2020    CREATININE 0.64 02/21/2020    CREATININE 0.63 03/21/2019    CREATININE 0.90 11/13/2017    EGFRIFNONA 97 02/21/2020    EGFRIFAFRI 118 02/21/2020       Lab Results   Component Value Date    HGBA1C 5.70 (H) 02/21/2020    HGBA1C 5.45 03/21/2019    GLUCOSE 98 05/16/2014       Lab Results   Component Value Date     (H) 02/21/2020     (H) 03/21/2019    LDL 93 11/13/2017     (H) 02/21/2020    HDL 93 (H) 03/21/2019    HDL 83 (H) 11/13/2017    TRIG 47 02/21/2020    TRIG 53 03/21/2019    TRIG 95 11/13/2017    CHOLHDLRATIO 2.30 02/21/2020    CHOLHDLRATIO 2.25 03/21/2019       Lab Results   Component Value Date    TSH 1.860 02/21/2020    FREET4 CANCELED 03/21/2019       Lab Results    Component Value Date    WBC 3.68 03/21/2019    HGB 14.2 03/21/2019    HGB 14.8 11/13/2017    HGB 14.5 04/30/2015     03/21/2019       Lab Results   Component Value Date    PROTEIN Negative 11/13/2017    GLUCOSEU Negative 11/13/2017    BLOODU Negative 11/13/2017    NITRITEU Negative 11/13/2017    LEUKOCYTESUR Negative 11/13/2017       Imaging:         Medical Tests:         Summary of old records / correspondence / consultant report:         Request outside records:         ALLERGIES  Allergies   Allergen Reactions   • Sulfa Antibiotics Hives        PFSH:     The following portions of the patient's history were reviewed and updated as appropriate: Allergies / Current Medications / Past Medical History / Surgical History / Social History / Family History    PROBLEM LIST   Patient Active Problem List   Diagnosis   • Essential hypertension   • Hyperlipidemia   • Obesity (BMI 30-39.9)   • Routine health maintenance   • Elevated glucose   • White coat syndrome with diagnosis of hypertension   • Menopausal hot flushes       PAST MEDICAL HISTORY  Past Medical History:   Diagnosis Date   • Adiposity    • Allergic Sulfa   • Essential hypertension    • Hyperlipidemia    • Obesity (BMI 30-39.9)    • UTI (urinary tract infection)        SURGICAL HISTORY  Past Surgical History:   Procedure Laterality Date   • COLONOSCOPY  2017    Good   • TONSILLECTOMY  1972       SOCIAL HISTORY  Social History     Socioeconomic History   • Marital status:      Spouse name: Not on file   • Number of children: 0   • Years of education: Not on file   • Highest education level: Not on file   Occupational History   • 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[employer]   Tobacco Use   • Smoking status: Never Smoker   • Smokeless tobacco: Never Used   Substance and Sexual Activity   • Alcohol use: Yes     Types: 2 - 4 Glasses of wine per week     Comment: Occasional social drinks   • Drug use: No   • Sexual activity: Yes     Partners: Male     Birth  control/protection: OCP   Social History Narrative     March 2016       FAMILY HISTORY  Family History   Problem Relation Age of Onset   • Coronary artery disease Other         MI   • Early death Sister    • ALS Sister    • Hypertension Mother    • Heart disease Mother    • Heart attack Mother    • Breast cancer Neg Hx    • Ovarian cancer Neg Hx    • Uterine cancer Neg Hx    • Colon cancer Neg Hx          **[place] Disclaimer:   Much of this encounter note is an electronic transcription/translation of spoken language to printed text. The electronic translation of spoken language may permit erroneous, or at times, nonsensical words or phrases to be inadvertently transcribed. Although I have reviewed the note for such errors, some may still exist.

## 2020-07-02 DIAGNOSIS — Z78.0 POST-MENOPAUSAL: Primary | ICD-10-CM

## 2020-07-07 ENCOUNTER — OFFICE VISIT (OUTPATIENT)
Dept: INTERNAL MEDICINE | Age: 55
End: 2020-07-07

## 2020-07-07 VITALS
TEMPERATURE: 98.2 F | OXYGEN SATURATION: 99 % | HEIGHT: 65 IN | DIASTOLIC BLOOD PRESSURE: 80 MMHG | HEART RATE: 107 BPM | SYSTOLIC BLOOD PRESSURE: 126 MMHG | BODY MASS INDEX: 34.16 KG/M2 | WEIGHT: 205 LBS

## 2020-07-07 DIAGNOSIS — R30.0 DYSURIA: ICD-10-CM

## 2020-07-07 DIAGNOSIS — S46.812A STRAIN OF LEFT DELTOID MUSCLE, INITIAL ENCOUNTER: ICD-10-CM

## 2020-07-07 DIAGNOSIS — E66.9 OBESITY (BMI 30-39.9): ICD-10-CM

## 2020-07-07 DIAGNOSIS — N39.0 ACUTE UTI: ICD-10-CM

## 2020-07-07 DIAGNOSIS — I10 ESSENTIAL HYPERTENSION: Primary | ICD-10-CM

## 2020-07-07 LAB
BILIRUB BLD-MCNC: NEGATIVE MG/DL
CLARITY, POC: CLEAR
COLOR UR: YELLOW
GLUCOSE UR STRIP-MCNC: NEGATIVE MG/DL
KETONES UR QL: NEGATIVE
LEUKOCYTE EST, POC: ABNORMAL
NITRITE UR-MCNC: NEGATIVE MG/ML
PH UR: 6.5 [PH] (ref 5–8)
PROT UR STRIP-MCNC: ABNORMAL MG/DL
RBC # UR STRIP: ABNORMAL /UL
SP GR UR: 1.01 (ref 1–1.03)
UROBILINOGEN UR QL: NORMAL

## 2020-07-07 PROCEDURE — 99214 OFFICE O/P EST MOD 30 MIN: CPT | Performed by: NURSE PRACTITIONER

## 2020-07-07 PROCEDURE — 81003 URINALYSIS AUTO W/O SCOPE: CPT | Performed by: NURSE PRACTITIONER

## 2020-07-07 RX ORDER — AMLODIPINE BESYLATE 5 MG/1
5 TABLET ORAL DAILY
Qty: 90 TABLET | Refills: 1 | Status: SHIPPED | OUTPATIENT
Start: 2020-07-07 | End: 2021-05-04

## 2020-07-07 RX ORDER — NITROFURANTOIN 25; 75 MG/1; MG/1
100 CAPSULE ORAL 2 TIMES DAILY
Qty: 14 CAPSULE | Refills: 0 | Status: SHIPPED | OUTPATIENT
Start: 2020-07-07 | End: 2020-07-10 | Stop reason: ALTCHOICE

## 2020-07-07 NOTE — PROGRESS NOTES
[facility] [place] [facility] MEDICINE  Trina Bui / 55 y.o. / female  07/07/2020      ASSESSMENT & PLAN:    Problem List Items Addressed This Visit        Cardiovascular and Mediastinum    Essential hypertension - Primary    Relevant Medications    hydroCHLOROthiazide (HYDRODIURIL) 25 MG tablet    ramipril (ALTACE) 10 MG capsule    amLODIPine (NORVASC) 5 MG tablet       Digestive    Obesity (BMI 30-39.9)    Relevant Medications    naltrexone-bupropion ER (CONTRAVE) 8-90 MG tablet      Other Visit Diagnoses     Dysuria        Relevant Orders    POC Urinalysis Dipstick, Automated (Completed)    Acute UTI        Relevant Medications    nitrofurantoin, macrocrystal-monohydrate, (Macrobid) 100 MG capsule    Other Relevant Orders    Urine Culture - Urine, Urine, Clean Catch    Strain of left deltoid muscle, initial encounter            Orders Placed This Encounter   Procedures   • Urine Culture - Urine, Urine, Clean Catch   • POC Urinalysis Dipstick, Automated     New Medications Ordered This Visit   Medications   • amLODIPine (NORVASC) 5 MG tablet     Sig: Take 1 tablet by mouth Daily.     Dispense:  90 tablet     Refill:  1   • naltrexone-bupropion ER (CONTRAVE) 8-90 MG tablet     Sig: Wk 1: 1 tab daily, Wk 2: 1 tab twice a day, Wk 3: 2 tabs in AM, 1 tab in PM, Wk 4: 2 tabs twice a day, Maintenance dose: 2 tabs twice daily.     Dispense:  120 tablet     Refill:  5   • nitrofurantoin, macrocrystal-monohydrate, (Macrobid) 100 MG capsule     Sig: Take 1 capsule by mouth 2 (Two) Times a Day for 7 days.     Dispense:  14 capsule     Refill:  0       Summary/Discussion:    1. Essential hypertension  Blood pressure suboptimally controlled.  Recommend increasing amlodipine to 5 mg daily.  Continue to monitor blood pressure at home, notify me if consistently greater than 1 30-1 40 over 80s.  Follow-up in office in 2 months.      - amLODIPine (NORVASC) 5 MG tablet; Take 1 tablet by mouth Daily.  Dispense: 90 tablet;  Refill: 1    2. Obesity (BMI 30-39.9)  Restart Contrave as previously prescribed.  She was doing well in the medication and other than the slightly elevated blood pressure did not have any adverse effects.  Will increase her amlodipine.  If blood pressure continues to elevate despite change in medication, will need to consider discontinuation.  Follow-up 2 months for weight check and follow-up.    - naltrexone-bupropion ER (CONTRAVE) 8-90 MG tablet; Wk 1: 1 tab daily, Wk 2: 1 tab twice a day, Wk 3: 2 tabs in AM, 1 tab in PM, Wk 4: 2 tabs twice a day, Maintenance dose: 2 tabs twice daily.  Dispense: 120 tablet; Refill: 5    3. Dysuria    - POC Urinalysis Dipstick, Automated    4. Acute UTI  Will start patient on PO antibiotics for treatment of lower UTI. Instructed patient to increase PO Fluid intake, may use OTC pyridium for up to 3 days for symptom relief. Discussed with patient to monitor for worsening or new symptoms, such as fever, severe flank pain, vomiting and to notify office immediately should these symptoms arise.       - Urine Culture - Urine, Urine, Clean Catch  - nitrofurantoin, macrocrystal-monohydrate, (Macrobid) 100 MG capsule; Take 1 capsule by mouth 2 (Two) Times a Day for 7 days.  Dispense: 14 capsule; Refill: 0    5. Strain of left deltoid muscle, initial encounter  Recommend use of Voltaren gel, heat, avoidance of heavy lifting left arm.   Follow up for any worsening symptoms or no improvement in the next 2-4 weeks.       Return in about 2 months (around 9/7/2020) for Next scheduled follow up.    ____________________________________________________________________    MEDICATIONS  Current Outpatient Medications   Medication Sig Dispense Refill   • escitalopram (LEXAPRO) 10 MG tablet TAKE 1 TABLET DAILY 90 tablet 1   • hydroCHLOROthiazide (HYDRODIURIL) 25 MG tablet Take 1 tablet by mouth Daily. 90 tablet 1   • Multiple Minerals-Vitamins (CALCIUM CITRATE PLUS/MAGNESIUM) tablet Take  by mouth.     •  "naltrexone-bupropion ER (CONTRAVE) 8-90 MG tablet Wk 1: 1 tab daily, Wk 2: 1 tab twice a day, Wk 3: 2 tabs in AM, 1 tab in PM, Wk 4: 2 tabs twice a day, Maintenance dose: 2 tabs twice daily. 120 tablet 5   • pravastatin (PRAVACHOL) 40 MG tablet TAKE 1 TABLET BY MOUTH DAILY. PT NEEDS APPT FOR FURTHER REFILLS 90 tablet 1   • ramipril (ALTACE) 10 MG capsule TAKE 2 CAPSULES DAILY 180 capsule 1   • Vitamin D, Cholecalciferol, 1000 units capsule Take  by mouth.     • amLODIPine (NORVASC) 5 MG tablet Take 1 tablet by mouth Daily. 90 tablet 1   • nitrofurantoin, macrocrystal-monohydrate, (Macrobid) 100 MG capsule Take 1 capsule by mouth 2 (Two) Times a Day for 7 days. 14 capsule 0     No current facility-administered medications for this visit.           VITALS:    Visit Vitals  /80   Pulse 107   Temp 98.2 °F (36.8 °C) (Temporal)   Ht 165.1 cm (65\")   Wt 93 kg (205 lb)   LMP 03/07/2018 (Exact Date)   SpO2 99%   BMI 34.11 kg/m²       BP Readings from Last 3 Encounters:   07/07/20 126/80   05/21/20 137/86   04/14/20 128/78     Wt Readings from Last 3 Encounters:   07/07/20 93 kg (205 lb)   05/21/20 88 kg (194 lb)   04/14/20 88.9 kg (196 lb)      Body mass index is 34.11 kg/m².    CC:  Main reason(s) for today's visit: Urinary Tract Infection; Shoulder Pain; and Weight Gain      HPI:     Patient initially scheduled today for weight check.  She had been on Contrave since February and had been tolerating well.  However, she reports of the last 2 weeks she had been monitoring her blood pressure and it was increasing into the 140s to 150s systolic.  She subsequently weaned herself from the Contrave as she was concerned that this was the cause of her blood pressure elevation.  Blood pressures have since normalized.  She had been successful on the contrary with weight loss, and was down approximately 15 lbs before stopping.     Urinary Tract Infection: Patient complains of dysuria and frequency She has had symptoms for 3 " days.Patient denies fever. Patient does have a history of recurrent UTI.  Patient does not have a history of pyelonephritis.     Shoulder Pain: Patient complaints of left shoulder pain. This is evaluated as a personal injury. The pain is described as aching.  The onset of the pain was sudden, not related to any specific activity.  The pain occurs continuously.  Location is lateral. No history of dislocation. Symptoms are aggravated by lifting, difficulty sleeping on affected side. Symptoms are diminished by  rest.   Limited activities include: no limitations. No stiffness is reported. 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           Patient Care Team:  Trina Pruett APRN as PCP - General (Internal Medicine)  Kesha Ellison MD as Consulting Physician (Obstetrics and Gynecology)    ____________________________________________________________________    REVIEW OF SYSTEMS    Review of Systems   Constitutional: Negative for activity change, appetite change, chills, fever and unexpected weight change.   HENT: Negative for tinnitus.    Eyes: Negative for visual disturbance.   Respiratory: Negative for cough, chest tightness and shortness of breath.    Cardiovascular: Negative for chest pain, palpitations and leg swelling.   Gastrointestinal: Negative for abdominal pain, nausea and vomiting.   Genitourinary: Positive for dysuria and frequency. Negative for decreased urine volume, difficulty urinating, flank pain, hematuria, pelvic pain, urgency, vaginal bleeding, vaginal discharge and vaginal pain.   Musculoskeletal: Positive for myalgias. Negative for back pain.   Neurological: Negative for dizziness, light-headedness and headaches.         PHYSICAL EXAMINATION    Physical Exam   Constitutional: She is oriented to person, place, and time. Vital signs are normal. She appears well-developed and well-nourished. She is cooperative. She does not appear ill. No distress.   Cardiovascular: Normal rate,  regular rhythm, S1 normal, S2 normal and normal heart sounds.   No murmur heard.  Pulmonary/Chest: Effort normal and breath sounds normal. She has no decreased breath sounds. She has no wheezes. She has no rhonchi. She has no rales.   Musculoskeletal:        Left shoulder: She exhibits decreased range of motion (pain with internal rotation. Negative empty can test. ).   Neurological: She is alert and oriented to person, place, and time.   Skin: Skin is warm, dry and intact.   Psychiatric: She has a normal mood and affect. Her speech is normal and behavior is normal. Judgment and thought content normal. Cognition and memory are normal.   Nursing note and vitals reviewed.      REVIEWED DATA:    Labs:     Lab Results   Component Value Date     (L) 02/21/2020    K 4.5 02/21/2020    AST 17 02/21/2020    ALT 16 02/21/2020    BUN 11 02/21/2020    CREATININE 0.64 02/21/2020    CREATININE 0.63 03/21/2019    CREATININE 0.90 11/13/2017    EGFRIFNONA 97 02/21/2020    EGFRIFAFRI 118 02/21/2020       Lab Results   Component Value Date    HGBA1C 5.70 (H) 02/21/2020    HGBA1C 5.45 03/21/2019    GLUCOSE 98 05/16/2014       Lab Results   Component Value Date     (H) 02/21/2020     (H) 03/21/2019    LDL 93 11/13/2017     (H) 02/21/2020    HDL 93 (H) 03/21/2019    HDL 83 (H) 11/13/2017    TRIG 47 02/21/2020    TRIG 53 03/21/2019    TRIG 95 11/13/2017    CHOLHDLRATIO 2.30 02/21/2020    CHOLHDLRATIO 2.25 03/21/2019       Lab Results   Component Value Date    TSH 1.860 02/21/2020    FREET4 CANCELED 03/21/2019       Lab Results   Component Value Date    WBC 3.68 03/21/2019    HGB 14.2 03/21/2019    HGB 14.8 11/13/2017    HGB 14.5 04/30/2015     03/21/2019       Lab Results   Component Value Date    PROTEIN Negative 11/13/2017    GLUCOSEU Negative 11/13/2017    BLOODU Negative 11/13/2017    NITRITEU Negative 11/13/2017    LEUKOCYTESUR Trace (A) 07/07/2020       Imaging:         Medical Tests:          Summary of old records / correspondence / consultant report:         Request outside records:         ALLERGIES  Allergies   Allergen Reactions   • Sulfa Antibiotics Hives        PFSH:     The following portions of the patient's history were reviewed and updated as appropriate: Allergies / Current Medications / Past Medical History / Surgical History / Social History / Family History    PROBLEM LIST   Patient Active Problem List   Diagnosis   • Essential hypertension   • Hyperlipidemia   • Obesity (BMI 30-39.9)   • Routine health maintenance   • Elevated glucose   • White coat syndrome with diagnosis of hypertension   • Menopausal hot flushes       PAST MEDICAL HISTORY  Past Medical History:   Diagnosis Date   • Adiposity    • Allergic Sulfa   • Essential hypertension    • Hyperlipidemia    • Obesity (BMI 30-39.9)    • UTI (urinary tract infection)        SURGICAL HISTORY  Past Surgical History:   Procedure Laterality Date   • COLONOSCOPY  2017    Good   • TONSILLECTOMY  1972       SOCIAL HISTORY  Social History     Socioeconomic History   • Marital status:      Spouse name: Not on file   • Number of children: 0   • Years of education: Not on file   • Highest education level: Not on file   Occupational History   • 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[employer]   Tobacco Use   • Smoking status: Never Smoker   • Smokeless tobacco: Never Used   Substance and Sexual Activity   • Alcohol use: Yes     Types: 2 - 4 Glasses of wine per week     Comment: Occasional social drinks   • Drug use: No   • Sexual activity: Yes     Partners: Male     Birth control/protection: OCP   Social History Narrative     March 2016       FAMILY HISTORY  Family History   Problem Relation Age of Onset   • Coronary artery disease Other         MI   • Early death Sister    • ALS Sister    • Hypertension Mother    • Heart disease Mother    • Heart attack Mother    • Breast cancer Neg Hx    • Ovarian cancer Neg Hx    • Uterine cancer Neg Hx    •  Colon cancer Neg Hx          **[place] Disclaimer:   Much of this encounter note is an electronic transcription/translation of spoken language to printed text. The electronic translation of spoken language may permit erroneous, or at times, nonsensical words or phrases to be inadvertently transcribed. Although I have reviewed the note for such errors, some may still exist.

## 2020-07-10 DIAGNOSIS — N39.0 ACUTE UTI: Primary | ICD-10-CM

## 2020-07-10 LAB
BACTERIA UR CULT: ABNORMAL
BACTERIA UR CULT: ABNORMAL
OTHER ANTIBIOTIC SUSC ISLT: ABNORMAL

## 2020-07-10 RX ORDER — AMOXICILLIN AND CLAVULANATE POTASSIUM 500; 125 MG/1; MG/1
1 TABLET, FILM COATED ORAL 2 TIMES DAILY
Qty: 10 TABLET | Refills: 0 | Status: SHIPPED | OUTPATIENT
Start: 2020-07-10 | End: 2020-07-15

## 2020-08-02 DIAGNOSIS — I10 ESSENTIAL HYPERTENSION: ICD-10-CM

## 2020-08-02 RX ORDER — HYDROCHLOROTHIAZIDE 25 MG/1
TABLET ORAL
Qty: 90 TABLET | Refills: 1 | Status: SHIPPED | OUTPATIENT
Start: 2020-08-02 | End: 2020-09-10 | Stop reason: ALTCHOICE

## 2020-08-03 RX ORDER — RAMIPRIL 10 MG/1
CAPSULE ORAL
Qty: 180 CAPSULE | Refills: 1 | Status: SHIPPED | OUTPATIENT
Start: 2020-08-03 | End: 2021-01-18

## 2020-08-06 ENCOUNTER — TELEPHONE (OUTPATIENT)
Dept: INTERNAL MEDICINE | Age: 55
End: 2020-08-06

## 2020-08-06 NOTE — TELEPHONE ENCOUNTER
Contact patient.     Have her increase amlodipine to 10 mg daily.  I would like her to send me some updated blood pressure readings in about a week.

## 2020-08-06 NOTE — TELEPHONE ENCOUNTER
----- Message from Niesha Bui sent at 8/5/2020  5:21 PM EDT -----  Regarding: RE: Visit Follow-Up Question  Contact: [phone]  Yes  ----- Message -----  From: ALPHONSO Vasquez  Sent: 8/5/2020  3:40 PM EDT  To: Niesha Bui  Subject: RE: Visit Follow-Up Question  Niesha:    You are currently taking amlodipine 5mg daily right now correct?         ----- Message -----     From: Niesha Bui     Sent: 8/5/2020  1:57 PM EDT       To: ALPHONSO Vasquez  Subject: Visit Follow-Up Question    Michael Mena.  I have lost 6 pounds in the past 4 weeks.  My BP has been getting worse since I started taking 2 Contrave  pills in the morning and 2 in the evening.   Last week it was 137/86 up to 137/101 today.  I have been walking this past week.    What do you suggest?  We added an extra amlodipine.    Thank you, Niesha

## 2020-08-23 DIAGNOSIS — I10 ESSENTIAL HYPERTENSION: ICD-10-CM

## 2020-08-24 RX ORDER — AMLODIPINE BESYLATE 2.5 MG/1
TABLET ORAL
Qty: 90 TABLET | Refills: 1 | OUTPATIENT
Start: 2020-08-24

## 2020-09-10 ENCOUNTER — OFFICE VISIT (OUTPATIENT)
Dept: INTERNAL MEDICINE | Age: 55
End: 2020-09-10

## 2020-09-10 VITALS
OXYGEN SATURATION: 99 % | SYSTOLIC BLOOD PRESSURE: 140 MMHG | HEIGHT: 65 IN | HEART RATE: 107 BPM | TEMPERATURE: 97.4 F | BODY MASS INDEX: 33.15 KG/M2 | WEIGHT: 199 LBS | DIASTOLIC BLOOD PRESSURE: 80 MMHG

## 2020-09-10 DIAGNOSIS — I10 ESSENTIAL HYPERTENSION: Primary | ICD-10-CM

## 2020-09-10 DIAGNOSIS — E66.9 OBESITY (BMI 30-39.9): ICD-10-CM

## 2020-09-10 PROCEDURE — 99214 OFFICE O/P EST MOD 30 MIN: CPT | Performed by: NURSE PRACTITIONER

## 2020-09-10 RX ORDER — CHLORTHALIDONE 50 MG/1
50 TABLET ORAL DAILY
Qty: 90 TABLET | Refills: 0 | Status: SHIPPED | OUTPATIENT
Start: 2020-09-10 | End: 2020-11-05

## 2020-09-11 RX ORDER — ESCITALOPRAM OXALATE 10 MG/1
TABLET ORAL
Qty: 90 TABLET | Refills: 1 | Status: SHIPPED | OUTPATIENT
Start: 2020-09-11 | End: 2020-12-10 | Stop reason: SDUPTHER

## 2020-09-11 NOTE — PROGRESS NOTES
[facility] [place] [facility]  Trina Bui / 55 y.o. / female  09/10/2020      ASSESSMENT & PLAN:    Problem List Items Addressed This Visit        Cardiovascular and Mediastinum    Essential hypertension - Primary    Relevant Medications    amLODIPine (NORVASC) 5 MG tablet    ramipril (ALTACE) 10 MG capsule    chlorthalidone (HYGROTEN) 50 MG tablet       Digestive    Obesity (BMI 30-39.9)        No orders of the defined types were placed in this encounter.    New Medications Ordered This Visit   Medications   • chlorthalidone (HYGROTEN) 50 MG tablet     Sig: Take 1 tablet by mouth Daily.     Dispense:  90 tablet     Refill:  0       Summary/Discussion:    1. Essential hypertension  Stop HCTZ, start chlorthalidone. Continue current dose ramipril and amlodipine.   Continue lifestyle modifications for high blood pressure, high cholesterol, and increased weight. Decrease/eliminate soda, caffeine, alcohol and overall caloric intake. Reduce carbohydrates and sweets in diet.  Continue to improve dietary habits with lean proteins, fresh vegetables, fruits, and nuts. Improve aerobic exercise: walking/biking/swimming daily as tolerated, recommend 30 minutes/day at least 5 days/week.   Follow up 6 weeks for BP recheck, BMP.     - chlorthalidone (HYGROTEN) 50 MG tablet; Take 1 tablet by mouth Daily.  Dispense: 90 tablet; Refill: 0    2. Obesity (BMI 30-39.9)  Has had no weight loss on Contrave. Likely contributing to elevated BP.   Discussed weaning over 4 week period.   Not a candidate for phentermine/Qsymia due to HTN.   Continue healthy diet, exercise. Follow up 6 weeks.         Return in about 6 weeks (around 10/22/2020).  ____________________________________________________________________    MEDICATIONS  Current Outpatient Medications   Medication Sig Dispense Refill   • amLODIPine (NORVASC) 5 MG tablet Take 1 tablet by mouth Daily. 90 tablet 1   • escitalopram (LEXAPRO) 10 MG tablet TAKE 1 TABLET  "DAILY 90 tablet 1   • Multiple Minerals-Vitamins (CALCIUM CITRATE PLUS/MAGNESIUM) tablet Take  by mouth.     • pravastatin (PRAVACHOL) 40 MG tablet TAKE 1 TABLET BY MOUTH DAILY. PT NEEDS APPT FOR FURTHER REFILLS 90 tablet 1   • ramipril (ALTACE) 10 MG capsule TAKE 2 CAPSULES DAILY 180 capsule 1   • Vitamin D, Cholecalciferol, 1000 units capsule Take  by mouth.     • chlorthalidone (HYGROTEN) 50 MG tablet Take 1 tablet by mouth Daily. 90 tablet 0     No current facility-administered medications for this visit.        VITALS    Visit Vitals  /80   Pulse 107   Temp 97.4 °F (36.3 °C) (Temporal)   Ht 165.1 cm (65\")   Wt 90.3 kg (199 lb)   LMP 03/07/2018 (Exact Date)   SpO2 99%   BMI 33.12 kg/m²       BP Readings from Last 3 Encounters:   09/10/20 140/80   07/07/20 126/80   05/21/20 137/86     Wt Readings from Last 3 Encounters:   09/10/20 90.3 kg (199 lb)   07/07/20 93 kg (205 lb)   05/21/20 88 kg (194 lb)      Body mass index is 33.12 kg/m².    CC:  Main reason(s) for today's visit: Follow-up for hypertension and obesity     HPI:   Chronic essential hypertension:  Since prior visit: compliant with medication(s), checks blood pressure regularly, c/o excessive swelling, SBP > 130, SBP > 140, DBP > 80 and DBP > 90. Currently taking amlodipine (Norvase) and ramipril, chlorthalidone. She is not exercising, but is adherent to low sodium diet. BP readings at home are not stable. She denies symptoms of chest pain/pressure, dyspnea, vision impairment. CVD risk factors include:  dyslipidemia, HTN, obesity (BMI>=30 kg/m2).  Most recent in-office blood pressure readings:     BP Readings from Last 3 Encounters:   09/10/20 140/80   07/07/20 126/80   05/21/20 137/86       Chronic hyperlipidemia:  Current therapy include pravastatin.    Most recent labs:   Lab Results   Component Value Date     (H) 02/21/2020     (H) 03/21/2019    LDL 93 11/13/2017     (H) 02/21/2020    HDL 93 (H) 03/21/2019    HDL 83 (H) " 11/13/2017    TRIG 47 02/21/2020    CHOLHDLRATIO 2.30 02/21/2020    CHOLHDLRATIO 2.25 03/21/2019        Patient has been on Contrave since April, had a brief period where she stopped taking it in July and restarted it. She initially lost some 14 lbs but has been unable to lose weight again. She reports nausea with medication. Blood pressure has also consistently been increasing since starting medication. She has also been unable to tolerate Saxenda due to localized urticaria at injection sites on multiple occasions.     Patient Care Team:  Trina Pruett APRN as PCP - General (Internal Medicine)  Kesha Ellison MD as Consulting Physician (Obstetrics and Gynecology)  ____________________________________________________________________      REVIEW OF SYSTEMS    Review of Systems   Constitutional: Negative for activity change, appetite change and unexpected weight change.   HENT: Negative for tinnitus.    Eyes: Negative for visual disturbance.   Respiratory: Negative for cough, chest tightness and shortness of breath.    Cardiovascular: Positive for leg swelling. Negative for chest pain and palpitations.   Neurological: Negative for dizziness, light-headedness and headaches.         PHYSICAL EXAMINATION    Physical Exam   Constitutional: She is oriented to person, place, and time. She appears well-developed and well-nourished. She is cooperative. She does not appear ill. No distress.   Cardiovascular: Normal rate, regular rhythm, S1 normal, S2 normal and normal heart sounds.   No murmur heard.  Pulmonary/Chest: Effort normal and breath sounds normal. She has no decreased breath sounds. She has no wheezes. She has no rhonchi. She has no rales.   Neurological: She is alert and oriented to person, place, and time.   Skin: Skin is warm, dry and intact.   Psychiatric: She has a normal mood and affect. Her speech is normal and behavior is normal. Judgment and thought content normal. Cognition and memory are normal.    Nursing note and vitals reviewed.      REVIEWED DATA:    Labs:   Lab Results   Component Value Date     (L) 02/21/2020    K 4.5 02/21/2020    AST 17 02/21/2020    ALT 16 02/21/2020    BUN 11 02/21/2020    CREATININE 0.64 02/21/2020    CREATININE 0.63 03/21/2019    CREATININE 0.90 11/13/2017    EGFRIFNONA 97 02/21/2020    EGFRIFAFRI 118 02/21/2020       Lab Results   Component Value Date    HGBA1C 5.70 (H) 02/21/2020    HGBA1C 5.45 03/21/2019    GLUCOSE 98 05/16/2014       Lab Results   Component Value Date     (H) 02/21/2020     (H) 03/21/2019    LDL 93 11/13/2017     (H) 02/21/2020    HDL 93 (H) 03/21/2019    HDL 83 (H) 11/13/2017    TRIG 47 02/21/2020    TRIG 53 03/21/2019    TRIG 95 11/13/2017    CHOLHDLRATIO 2.30 02/21/2020    CHOLHDLRATIO 2.25 03/21/2019       Lab Results   Component Value Date    TSH 1.860 02/21/2020    FREET4 CANCELED 03/21/2019          Lab Results   Component Value Date    WBC 3.68 03/21/2019    HGB 14.2 03/21/2019    HGB 14.8 11/13/2017    HGB 14.5 04/30/2015     03/21/2019       Lab Results   Component Value Date    PROTEIN Negative 11/13/2017    GLUCOSEU Negative 11/13/2017    BLOODU Negative 11/13/2017    NITRITEU Negative 11/13/2017    LEUKOCYTESUR Trace (A) 07/07/2020       Imaging:        Medical Tests:        Summary of old records / correspondence / consultant report:        Request outside records:        ALLERGIES  Allergies   Allergen Reactions   • Sulfa Antibiotics Hives        PFSH:     The following portions of the patient's history were reviewed and updated as appropriate: Allergies / Current Medications / Past Medical History / Surgical History / Social History / Family History    PROBLEM LIST   Patient Active Problem List   Diagnosis   • Essential hypertension   • Hyperlipidemia   • Obesity (BMI 30-39.9)   • Routine health maintenance   • Elevated glucose   • White coat syndrome with diagnosis of hypertension   • Menopausal hot flushes        PAST MEDICAL HISTORY  Past Medical History:   Diagnosis Date   • Adiposity    • Allergic Sulfa   • Essential hypertension    • Hyperlipidemia    • Obesity (BMI 30-39.9)    • UTI (urinary tract infection)        SURGICAL HISTORY  Past Surgical History:   Procedure Laterality Date   • COLONOSCOPY  2017    Good   • TONSILLECTOMY  1972       SOCIAL HISTORY  Social History     Socioeconomic History   • Marital status:      Spouse name: Not on file   • Number of children: 0   • Years of education: Not on file   • Highest education level: Not on file   Occupational History   • 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[employer]   Tobacco Use   • Smoking status: Never Smoker   • Smokeless tobacco: Never Used   Substance and Sexual Activity   • Alcohol use: Yes     Types: 2 - 4 Glasses of wine per week     Comment: Occasional social drinks   • Drug use: No   • Sexual activity: Yes     Partners: Male     Birth control/protection: OCP   Social History Narrative     March 2016       FAMILY HISTORY  Family History   Problem Relation Age of Onset   • Coronary artery disease Other         MI   • Early death Sister    • ALS Sister    • Hypertension Mother    • Heart disease Mother    • Heart attack Mother    • Breast cancer Neg Hx    • Ovarian cancer Neg Hx    • Uterine cancer Neg Hx    • Colon cancer Neg Hx

## 2020-09-22 ENCOUNTER — TELEPHONE (OUTPATIENT)
Dept: INTERNAL MEDICINE | Age: 55
End: 2020-09-22

## 2020-09-22 NOTE — TELEPHONE ENCOUNTER
----- Message from Figueroa Robison MD sent at 9/22/2020 11:06 AM EDT -----  Regarding: FW: Prescription Question  Contact: [phone]  I am not sure if she is making a statement or asking a question seeking advice.  It sounds as if the chlorthalidone is causing the diarrhea that she speaks of in the smaller dose of amlodipine seems to be agreeing with her.  She may just want to hold off of the chlorthalidone and take the amlodipine pending Trina's return next week and she can discuss it with her then  ----- Message -----  From: Sunitha Sargent LPN  Sent: 9/22/2020  10:51 AM EDT  To: ALPHONSO Vasquez  Subject: FW: Prescription Question                          ----- Message -----  From: Niesha Bui  Sent: 9/22/2020  10:46 AM EDT  To: [id number]  Subject: Prescription Question                            Good Morning Trina.  I looked up the Chlorthalidone side effects because I have had diarrhea almost everyday.  I also saw that if you have an allergy to Sulfa you should not take it.  Since I am only taking 5Mg of amlodipine, my ankle swelling has gone.    Should I continue taking the Chlorthalidone?    Thank you Niesha

## 2020-09-22 NOTE — TELEPHONE ENCOUNTER
Pt notified of MD dictation. Pt will hold off taking Chlorthalidone until she speaks with ALPHONSO Pruett. MM

## 2020-10-01 DIAGNOSIS — I10 ESSENTIAL HYPERTENSION: ICD-10-CM

## 2020-10-01 RX ORDER — AMLODIPINE BESYLATE 5 MG/1
5 TABLET ORAL DAILY
Qty: 90 TABLET | Refills: 1 | Status: SHIPPED | OUTPATIENT
Start: 2020-10-01 | End: 2020-11-05 | Stop reason: SDUPTHER

## 2020-10-20 DIAGNOSIS — E78.5 HYPERLIPIDEMIA, UNSPECIFIED HYPERLIPIDEMIA TYPE: ICD-10-CM

## 2020-10-20 RX ORDER — PRAVASTATIN SODIUM 40 MG
40 TABLET ORAL DAILY
Qty: 90 TABLET | Refills: 1 | Status: SHIPPED | OUTPATIENT
Start: 2020-10-20 | End: 2021-04-22

## 2020-10-30 ENCOUNTER — APPOINTMENT (OUTPATIENT)
Dept: WOMENS IMAGING | Facility: HOSPITAL | Age: 55
End: 2020-10-30

## 2020-10-30 PROCEDURE — 77080 DXA BONE DENSITY AXIAL: CPT | Performed by: RADIOLOGY

## 2020-11-05 ENCOUNTER — OFFICE VISIT (OUTPATIENT)
Dept: INTERNAL MEDICINE | Age: 55
End: 2020-11-05

## 2020-11-05 VITALS
TEMPERATURE: 97.5 F | DIASTOLIC BLOOD PRESSURE: 88 MMHG | OXYGEN SATURATION: 99 % | RESPIRATION RATE: 16 BRPM | WEIGHT: 197 LBS | HEART RATE: 102 BPM | HEIGHT: 65 IN | BODY MASS INDEX: 32.82 KG/M2 | SYSTOLIC BLOOD PRESSURE: 130 MMHG

## 2020-11-05 DIAGNOSIS — Z78.0 POST-MENOPAUSAL: ICD-10-CM

## 2020-11-05 DIAGNOSIS — I10 ESSENTIAL HYPERTENSION: Primary | ICD-10-CM

## 2020-11-05 DIAGNOSIS — R73.09 ELEVATED GLUCOSE: ICD-10-CM

## 2020-11-05 DIAGNOSIS — I10 WHITE COAT SYNDROME WITH DIAGNOSIS OF HYPERTENSION: ICD-10-CM

## 2020-11-05 PROCEDURE — 99214 OFFICE O/P EST MOD 30 MIN: CPT | Performed by: NURSE PRACTITIONER

## 2020-11-05 RX ORDER — HYDROCHLOROTHIAZIDE 12.5 MG/1
12.5 CAPSULE, GELATIN COATED ORAL DAILY
COMMUNITY
End: 2020-12-17 | Stop reason: SDUPTHER

## 2020-11-05 NOTE — PROGRESS NOTES
[facility] [place] [facility]  Trina Bui / 55 y.o. / female  11/05/2020      ASSESSMENT & PLAN:    Problem List Items Addressed This Visit        Cardiovascular and Mediastinum    Essential hypertension - Primary    Relevant Medications    amLODIPine (NORVASC) 5 MG tablet    ramipril (ALTACE) 10 MG capsule    hydroCHLOROthiazide (MICROZIDE) 12.5 MG capsule    Other Relevant Orders    Basic metabolic panel    White coat syndrome with diagnosis of hypertension    Relevant Medications    amLODIPine (NORVASC) 5 MG tablet    ramipril (ALTACE) 10 MG capsule    hydroCHLOROthiazide (MICROZIDE) 12.5 MG capsule       Other    Elevated glucose    Overview     November 14, 2017, 114 mg percent         Relevant Orders    Basic metabolic panel    Hemoglobin A1c        Orders Placed This Encounter   Procedures   • Basic metabolic panel   • Hemoglobin A1c     No orders of the defined types were placed in this encounter.      Summary/Discussion:    1. Essential hypertension  Blood pressure stable on current therapy, continue same.  Check labs today and adjust dosage of medication as necessary.  Follow-up 4 months.  Continue lifestyle modifications for high blood pressure, high cholesterol, and increased weight. Decrease/eliminate soda, caffeine, alcohol and overall caloric intake. Reduce carbohydrates and sweets in diet.  Continue to improve dietary habits with lean proteins, fresh vegetables, fruits, and nuts. Improve aerobic exercise: walking/biking/swimming daily as tolerated, recommend 30 minutes/day at least 5 days/week.     - Basic metabolic panel    2. Elevated glucose    - Basic metabolic panel  - Hemoglobin A1c    3. White coat syndrome with diagnosis of hypertension          Return in about 4 months (around 3/5/2021) for Next scheduled follow up.  ____________________________________________________________________    MEDICATIONS  Current Outpatient Medications   Medication Sig Dispense Refill   •  "amLODIPine (NORVASC) 5 MG tablet Take 1 tablet by mouth Daily. 90 tablet 1   • escitalopram (LEXAPRO) 10 MG tablet TAKE 1 TABLET DAILY 90 tablet 1   • hydroCHLOROthiazide (MICROZIDE) 12.5 MG capsule Take 12.5 mg by mouth Daily.     • Multiple Minerals-Vitamins (CALCIUM CITRATE PLUS/MAGNESIUM) tablet Take  by mouth.     • pravastatin (PRAVACHOL) 40 MG tablet TAKE 1 TABLET BY MOUTH DAILY. PT NEEDS APPT FOR FURTHER REFILLS 90 tablet 1   • ramipril (ALTACE) 10 MG capsule TAKE 2 CAPSULES DAILY 180 capsule 1   • Vitamin D, Cholecalciferol, 1000 units capsule Take  by mouth.       No current facility-administered medications for this visit.        VITALS    Visit Vitals  /88   Pulse 102   Temp 97.5 °F (36.4 °C)   Resp 16   Ht 165.1 cm (65\")   Wt 89.4 kg (197 lb)   LMP 03/07/2018 (Exact Date)   SpO2 99%   BMI 32.78 kg/m²       BP Readings from Last 3 Encounters:   11/05/20 130/88   09/10/20 140/80   07/07/20 126/80     Wt Readings from Last 3 Encounters:   11/05/20 89.4 kg (197 lb)   09/10/20 90.3 kg (199 lb)   07/07/20 93 kg (205 lb)      Body mass index is 32.78 kg/m².    CC:  Main reason(s) for today's visit: Follow-up for hypertension and hyperlipidemia, weight check     HPI:   Chronic essential hypertension:  Since prior visit: compliant with medication(s), checks blood pressure regularly, denies significant problems with medication(s) and SBP < 140. Currently taking amlodipine (Norvase), hydrochlorothiazide (HCTZ) and ramipril. She is exercising, but is adherent to low sodium diet. BP readings at home are stable (please see media tab for scanned values). She denies symptoms of chest pain/pressure, dyspnea, swelling, vision impairment. CVD risk factors include:  dyslipidemia, HTN, obesity (BMI>=30 kg/m2).  Most recent in-office blood pressure readings:     BP Readings from Last 3 Encounters:   11/05/20 130/88   09/10/20 140/80   07/07/20 126/80       Chronic hyperlipidemia:  Current therapy include pravastatin.  "   Most recent labs:   Lab Results   Component Value Date     (H) 02/21/2020     (H) 03/21/2019    LDL 93 11/13/2017     (H) 02/21/2020    HDL 93 (H) 03/21/2019    HDL 83 (H) 11/13/2017    TRIG 47 02/21/2020    CHOLHDLRATIO 2.30 02/21/2020    CHOLHDLRATIO 2.25 03/21/2019        She had previously been on Contrave, but this was discontinued due to increase in blood pressure and stopped weight loss.  She has managed to lose a couple of pounds through watching her diet since last office visit.     Patient Care Team:  Trina Pruett APRN as PCP - General (Internal Medicine)  Kesha Ellison MD as Consulting Physician (Obstetrics and Gynecology)  ____________________________________________________________________      REVIEW OF SYSTEMS    Review of Systems   Constitutional: Negative for activity change, appetite change and unexpected weight change.   HENT: Negative for tinnitus.    Eyes: Negative for visual disturbance.   Respiratory: Negative for cough, chest tightness and shortness of breath.    Cardiovascular: Negative for chest pain, palpitations and leg swelling.   Neurological: Negative for dizziness, light-headedness and headaches.         PHYSICAL EXAMINATION    Physical Exam  Vitals signs and nursing note reviewed.   Constitutional:       General: She is not in acute distress.     Appearance: She is well-developed. She is not ill-appearing.   Cardiovascular:      Rate and Rhythm: Normal rate and regular rhythm.      Heart sounds: Normal heart sounds, S1 normal and S2 normal. No murmur.   Pulmonary:      Effort: Pulmonary effort is normal.      Breath sounds: Normal breath sounds. No decreased breath sounds, wheezing, rhonchi or rales.   Skin:     General: Skin is warm and dry.   Neurological:      Mental Status: She is alert and oriented to person, place, and time.   Psychiatric:         Speech: Speech normal.         Behavior: Behavior normal. Behavior is cooperative.         Thought  Content: Thought content normal.         Judgment: Judgment normal.         REVIEWED DATA:    Labs:   Lab Results   Component Value Date     (L) 02/21/2020    K 4.5 02/21/2020    AST 17 02/21/2020    ALT 16 02/21/2020    BUN 11 02/21/2020    CREATININE 0.64 02/21/2020    CREATININE 0.63 03/21/2019    CREATININE 0.90 11/13/2017    EGFRIFNONA 97 02/21/2020    EGFRIFAFRI 118 02/21/2020       Lab Results   Component Value Date    HGBA1C 5.70 (H) 02/21/2020    HGBA1C 5.45 03/21/2019    GLUCOSE 98 05/16/2014       Lab Results   Component Value Date     (H) 02/21/2020     (H) 03/21/2019    LDL 93 11/13/2017     (H) 02/21/2020    HDL 93 (H) 03/21/2019    HDL 83 (H) 11/13/2017    TRIG 47 02/21/2020    TRIG 53 03/21/2019    TRIG 95 11/13/2017    CHOLHDLRATIO 2.30 02/21/2020    CHOLHDLRATIO 2.25 03/21/2019       Lab Results   Component Value Date    TSH 1.860 02/21/2020    FREET4 CANCELED 03/21/2019          Lab Results   Component Value Date    WBC 3.68 03/21/2019    HGB 14.2 03/21/2019    HGB 14.8 11/13/2017    HGB 14.5 04/30/2015     03/21/2019       Lab Results   Component Value Date    PROTEIN Negative 11/13/2017    GLUCOSEU Negative 11/13/2017    BLOODU Negative 11/13/2017    NITRITEU Negative 11/13/2017    LEUKOCYTESUR Trace (A) 07/07/2020       Imaging:        Medical Tests:        Summary of old records / correspondence / consultant report:        Request outside records:        ALLERGIES  Allergies   Allergen Reactions   • Sulfa Antibiotics Hives   • Chlorthalidone Diarrhea        PFSH:     The following portions of the patient's history were reviewed and updated as appropriate: Allergies / Current Medications / Past Medical History / Surgical History / Social History / Family History    PROBLEM LIST   Patient Active Problem List   Diagnosis   • Essential hypertension   • Hyperlipidemia   • Obesity (BMI 30-39.9)   • Routine health maintenance   • Elevated glucose   • White coat  syndrome with diagnosis of hypertension   • Menopausal hot flushes       PAST MEDICAL HISTORY  Past Medical History:   Diagnosis Date   • Adiposity    • Allergic Sulfa   • Essential hypertension    • Hyperlipidemia    • Obesity (BMI 30-39.9)    • UTI (urinary tract infection)        SURGICAL HISTORY  Past Surgical History:   Procedure Laterality Date   • COLONOSCOPY  2017    Good   • TONSILLECTOMY  1972       SOCIAL HISTORY  Social History     Socioeconomic History   • Marital status:      Spouse name: Not on file   • Number of children: 0   • Years of education: Not on file   • Highest education level: Not on file   Occupational History   • 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[employer]   Tobacco Use   • Smoking status: Never Smoker   • Smokeless tobacco: Never Used   Substance and Sexual Activity   • Alcohol use: Yes     Types: 2 - 4 Glasses of wine per week     Comment: Occasional social drinks   • Drug use: No   • Sexual activity: Yes     Partners: Male     Birth control/protection: OCP   Social History Narrative     March 2016       FAMILY HISTORY  Family History   Problem Relation Age of Onset   • Coronary artery disease Other         MI   • Early death Sister    • ALS Sister    • Hypertension Mother    • Heart disease Mother    • Heart attack Mother    • Breast cancer Neg Hx    • Ovarian cancer Neg Hx    • Uterine cancer Neg Hx    • Colon cancer Neg Hx

## 2020-11-06 LAB
BUN SERPL-MCNC: 16 MG/DL (ref 6–20)
BUN/CREAT SERPL: 21.6 (ref 7–25)
CALCIUM SERPL-MCNC: 9.1 MG/DL (ref 8.6–10.5)
CHLORIDE SERPL-SCNC: 96 MMOL/L (ref 98–107)
CO2 SERPL-SCNC: 28.1 MMOL/L (ref 22–29)
CREAT SERPL-MCNC: 0.74 MG/DL (ref 0.57–1)
GLUCOSE SERPL-MCNC: 89 MG/DL (ref 65–99)
HBA1C MFR BLD: 5.49 % (ref 4.8–5.6)
POTASSIUM SERPL-SCNC: 4 MMOL/L (ref 3.5–5.2)
SODIUM SERPL-SCNC: 131 MMOL/L (ref 136–145)

## 2020-12-10 ENCOUNTER — OFFICE VISIT (OUTPATIENT)
Dept: OBSTETRICS AND GYNECOLOGY | Facility: CLINIC | Age: 55
End: 2020-12-10

## 2020-12-10 ENCOUNTER — PROCEDURE VISIT (OUTPATIENT)
Dept: OBSTETRICS AND GYNECOLOGY | Facility: CLINIC | Age: 55
End: 2020-12-10

## 2020-12-10 ENCOUNTER — APPOINTMENT (OUTPATIENT)
Dept: WOMENS IMAGING | Facility: HOSPITAL | Age: 55
End: 2020-12-10

## 2020-12-10 VITALS
HEART RATE: 85 BPM | DIASTOLIC BLOOD PRESSURE: 92 MMHG | WEIGHT: 201.6 LBS | SYSTOLIC BLOOD PRESSURE: 152 MMHG | BODY MASS INDEX: 33.55 KG/M2

## 2020-12-10 DIAGNOSIS — Z01.419 WELL WOMAN EXAM WITH ROUTINE GYNECOLOGICAL EXAM: Primary | ICD-10-CM

## 2020-12-10 DIAGNOSIS — Z12.31 VISIT FOR SCREENING MAMMOGRAM: Primary | ICD-10-CM

## 2020-12-10 PROCEDURE — 77063 BREAST TOMOSYNTHESIS BI: CPT | Performed by: OBSTETRICS & GYNECOLOGY

## 2020-12-10 PROCEDURE — 77067 SCR MAMMO BI INCL CAD: CPT | Performed by: RADIOLOGY

## 2020-12-10 PROCEDURE — 77067 SCR MAMMO BI INCL CAD: CPT | Performed by: OBSTETRICS & GYNECOLOGY

## 2020-12-10 PROCEDURE — 99396 PREV VISIT EST AGE 40-64: CPT | Performed by: OBSTETRICS & GYNECOLOGY

## 2020-12-10 PROCEDURE — 77063 BREAST TOMOSYNTHESIS BI: CPT | Performed by: RADIOLOGY

## 2020-12-10 RX ORDER — ESCITALOPRAM OXALATE 10 MG/1
10 TABLET ORAL DAILY
Qty: 90 TABLET | Refills: 4 | Status: SHIPPED | OUTPATIENT
Start: 2020-12-10 | End: 2021-02-25

## 2020-12-10 NOTE — PROGRESS NOTES
Subjective   Niesha Bui is a 55 y.o. female   Chief Complaint   Patient presents with   • Gynecologic Exam     Annual. Pap- 2016 Mammo-2020 Colonoscopy-2017       Niesha Bui is a 55 y.o.  who presents for an annual examination     Pap history:  Last pap: 16 NIL, HPV negative  Prior abnormal paps: yes, years and years ago  STDs  Sexually active: yes - newly   History of STDs: no  Contraception:  No menses since coming off oral contraceptive pills in 2018  Colonoscopy: 1.5 years ago, normal  Mammogram: 12/10/20   Menopause: around 51yo, after coming off oral contraceptive pill  VMS: yes, better on lexapro    History of physical abuse: no, History of sexual abuse: no and History of verbal/emotional abuse: no    Screening for BRCA-   Is patient's family history significant for any of the following?   no  For non-[employer] women:   Two first-degree relatives with breast cancer, one of whom was diagnosed at age 50 or younger   A combination of three or more first or second-degree relatives with breast cancer regardless of age at diagnosis   A combination of both breast and ovarian cancer among first and second-degree relatives   A first-degree relative with bilateral breast cancer   A combination of two or more first or second degree relatives with ovarian cancer, regardless of age at diagnosis   A first or second-degree relative with both breast and ovarian cancer at any age   History of breast cancer in a male relative   For women of [employer] descent:   Any first-degree relative (or two second degree relatives on the same side of the family) with breast or ovarian cancer   Recommendations from the [place] Preventive Services Task Force on who should be offered genetic testing for BRCA mutations*     Past Medical History:   Diagnosis Date   • Adiposity    • Allergic Sulfa   • Essential hypertension    • Hyperlipidemia    • Obesity (BMI 30-39.9)    • UTI (urinary tract  infection)      Past Surgical History:   Procedure Laterality Date   • COLONOSCOPY  2017    Good   • TONSILLECTOMY  1972     Social History     Tobacco Use   • Smoking status: Never Smoker   • Smokeless tobacco: Never Used   Substance Use Topics   • Alcohol use: Yes     Types: 2 - 4 Glasses of wine per week     Comment: Occasional social drinks   • Drug use: No     Family History   Problem Relation Age of Onset   • Coronary artery disease Other         MI   • Early death Sister    • ALS Sister    • Hypertension Mother    • Heart disease Mother    • Heart attack Mother    • Breast cancer Neg Hx    • Ovarian cancer Neg Hx    • Uterine cancer Neg Hx    • Colon cancer Neg Hx      Current Outpatient Medications on File Prior to Visit   Medication Sig Dispense Refill   • amLODIPine (NORVASC) 5 MG tablet Take 1 tablet by mouth Daily. 90 tablet 1   • escitalopram (LEXAPRO) 10 MG tablet TAKE 1 TABLET DAILY 90 tablet 1   • hydroCHLOROthiazide (MICROZIDE) 12.5 MG capsule Take 12.5 mg by mouth Daily.     • Multiple Minerals-Vitamins (CALCIUM CITRATE PLUS/MAGNESIUM) tablet Take  by mouth.     • pravastatin (PRAVACHOL) 40 MG tablet TAKE 1 TABLET BY MOUTH DAILY. PT NEEDS APPT FOR FURTHER REFILLS 90 tablet 1   • ramipril (ALTACE) 10 MG capsule TAKE 2 CAPSULES DAILY 180 capsule 1   • Vitamin D, Cholecalciferol, 1000 units capsule Take  by mouth.       No current facility-administered medications on file prior to visit.      Allergies   Allergen Reactions   • Sulfa Antibiotics Hives   • Chlorthalidone Diarrhea        Review of Systems   Constitutional: Negative for chills, fever and unexpected weight change.   HENT: Negative for congestion, nosebleeds and sore throat.    Eyes: Negative for visual disturbance.   Respiratory: Negative for cough, chest tightness and shortness of breath.    Cardiovascular: Negative for chest pain and palpitations.   Gastrointestinal: Negative for abdominal pain, constipation, diarrhea, nausea and  vomiting.   Endocrine: Negative for polyuria.   Genitourinary: Negative for difficulty urinating, dyspareunia, dysuria, frequency, genital sores, hematuria, menstrual problem, pelvic pain, urgency, vaginal bleeding, vaginal discharge and vaginal pain.   Musculoskeletal: Negative for arthralgias and joint swelling.   Skin: Negative for color change and rash.   Neurological: Negative for dizziness, light-headedness and headaches.   Hematological: Does not bruise/bleed easily.   Psychiatric/Behavioral: Negative for dysphoric mood. The patient is not nervous/anxious.        Objective    /92   Pulse 85   Wt 91.4 kg (201 lb 9.6 oz)   LMP 03/07/2018 (Exact Date)   BMI 33.55 kg/m²    Physical Exam   Constitutional: She is oriented to person, place, and time. She appears well-developed. No distress.   HENT:   Head: Normocephalic and atraumatic.   Neck: No tracheal deviation present. No thyromegaly present.   Cardiovascular: Normal rate, regular rhythm and normal heart sounds. Exam reveals no gallop and no friction rub.   No murmur heard.  Pulmonary/Chest: Effort normal and breath sounds normal. No respiratory distress. She has no wheezes. She has no rales. She exhibits no tenderness. Right breast exhibits no inverted nipple, no mass, no nipple discharge, no skin change and no tenderness. Left breast exhibits no inverted nipple, no mass, no nipple discharge, no skin change and no tenderness.   Abdominal: Soft. She exhibits no distension and no mass. There is no abdominal tenderness.   Genitourinary: There is no rash, lesion or injury on the right labia. There is no rash, lesion or injury on the left labia. Uterus is not deviated, not enlarged, not fixed and not tender. Cervix exhibits no motion tenderness, no discharge and no friability. Right adnexum displays no mass, no tenderness and no fullness. Left adnexum displays no mass, no tenderness and no fullness.    No vaginal discharge, tenderness or bleeding.   No  tenderness or bleeding in the vagina.   Musculoskeletal: Normal range of motion. No deformity.   Lymphadenopathy:     She has no cervical adenopathy.   Neurological: She is alert and oriented to person, place, and time.   Skin: Skin is warm and dry. No rash noted. She is not diaphoretic.   Psychiatric: Her behavior is normal. Judgment and thought content normal.         Assessment/Plan   There are no diagnoses linked to this encounter.    Well woman counseling/screening:    Cervical cancer screening:    Reports remote h/o cervical dysplasia  The patient is due for a pap in 2021.    Screening guidelines discussed with patient  Breast cancer screening:    Clinical breast exam recommended for age 20-39 years every 1-3 years   Mammogram recommended starting age 40, done today   Breast self awareness encouraged  STD Screening   Declined  VMS of menopause:   Would like to continue Lexapro, Rx sent.  She feels that mood is great and hot flashes well controlled.   Family history    does not demonstrate need for genetics referral   Healthy lifestyle counseling:   Body mass index is 33.55 kg/m².   Colonoscopy   2017, due in 2027    Hypertension:  Patient has had elevations - follows up with PCP.     BMI counseling  Her BMI is classified as Obese  Is Niesha Bui ready to make a healthy lifestyle change today? yes  She is working with her PCP regarding weight control

## 2020-12-17 DIAGNOSIS — I10 ESSENTIAL HYPERTENSION: ICD-10-CM

## 2020-12-17 RX ORDER — CHLORTHALIDONE 50 MG/1
TABLET ORAL
Qty: 90 TABLET | Refills: 0 | OUTPATIENT
Start: 2020-12-17

## 2020-12-17 RX ORDER — HYDROCHLOROTHIAZIDE 12.5 MG/1
12.5 CAPSULE, GELATIN COATED ORAL DAILY
Qty: 90 CAPSULE | Refills: 1 | Status: SHIPPED | OUTPATIENT
Start: 2020-12-17 | End: 2021-01-18 | Stop reason: SDUPTHER

## 2020-12-17 NOTE — TELEPHONE ENCOUNTER
----- Message from Shira Reyez MA sent at 12/17/2020 10:01 AM EST -----  Regarding: FW: Prescription Question  Contact: [phone]  PLEASE ADVISE  ----- Message -----  From: Niesha Bui  Sent: 12/17/2020   9:17 AM EST  To: Tracy De [id number]  Subject: Prescription Question                            Hi Trina!  [facility] sent me a message that my RX for   hydroCHLOROthiazide was not being refilled.  I tried to request it on this page but it said it was unavailable.     Can you let me know if it will be refilled or what is going on?    Thank you, Niesha

## 2021-01-17 DIAGNOSIS — I10 ESSENTIAL HYPERTENSION: ICD-10-CM

## 2021-01-18 DIAGNOSIS — I10 ESSENTIAL HYPERTENSION: ICD-10-CM

## 2021-01-18 RX ORDER — HYDROCHLOROTHIAZIDE 12.5 MG/1
12.5 CAPSULE, GELATIN COATED ORAL DAILY
Qty: 90 CAPSULE | Refills: 1 | Status: SHIPPED | OUTPATIENT
Start: 2021-01-18 | End: 2021-08-17

## 2021-01-18 RX ORDER — RAMIPRIL 10 MG/1
CAPSULE ORAL
Qty: 180 CAPSULE | Refills: 1 | Status: SHIPPED | OUTPATIENT
Start: 2021-01-18 | End: 2021-07-16

## 2021-02-25 RX ORDER — ESCITALOPRAM OXALATE 10 MG/1
TABLET ORAL
Qty: 90 TABLET | Refills: 4 | Status: SHIPPED | OUTPATIENT
Start: 2021-02-25 | End: 2022-02-14

## 2021-03-30 ENCOUNTER — BULK ORDERING (OUTPATIENT)
Dept: CASE MANAGEMENT | Facility: OTHER | Age: 56
End: 2021-03-30

## 2021-03-30 DIAGNOSIS — Z23 IMMUNIZATION DUE: ICD-10-CM

## 2021-04-12 ENCOUNTER — IMMUNIZATION (OUTPATIENT)
Dept: VACCINE CLINIC | Facility: HOSPITAL | Age: 56
End: 2021-04-12

## 2021-04-12 DIAGNOSIS — Z23 IMMUNIZATION DUE: ICD-10-CM

## 2021-04-12 PROCEDURE — 0001A: CPT | Performed by: INTERNAL MEDICINE

## 2021-04-12 PROCEDURE — 91300 HC SARSCOV02 VAC 30MCG/0.3ML IM: CPT | Performed by: INTERNAL MEDICINE

## 2021-04-22 DIAGNOSIS — E78.5 HYPERLIPIDEMIA, UNSPECIFIED HYPERLIPIDEMIA TYPE: ICD-10-CM

## 2021-04-22 RX ORDER — PRAVASTATIN SODIUM 40 MG
40 TABLET ORAL DAILY
Qty: 90 TABLET | Refills: 1 | Status: SHIPPED | OUTPATIENT
Start: 2021-04-22 | End: 2021-10-20

## 2021-05-03 ENCOUNTER — IMMUNIZATION (OUTPATIENT)
Dept: VACCINE CLINIC | Facility: HOSPITAL | Age: 56
End: 2021-05-03

## 2021-05-03 PROCEDURE — 91300 HC SARSCOV02 VAC 30MCG/0.3ML IM: CPT | Performed by: INTERNAL MEDICINE

## 2021-05-03 PROCEDURE — 0002A: CPT | Performed by: INTERNAL MEDICINE

## 2021-05-04 ENCOUNTER — OFFICE VISIT (OUTPATIENT)
Dept: INTERNAL MEDICINE | Age: 56
End: 2021-05-04

## 2021-05-04 VITALS
SYSTOLIC BLOOD PRESSURE: 134 MMHG | WEIGHT: 193 LBS | BODY MASS INDEX: 32.15 KG/M2 | HEART RATE: 101 BPM | DIASTOLIC BLOOD PRESSURE: 84 MMHG | HEIGHT: 65 IN | TEMPERATURE: 97.8 F | OXYGEN SATURATION: 98 %

## 2021-05-04 DIAGNOSIS — R73.09 ELEVATED GLUCOSE: ICD-10-CM

## 2021-05-04 DIAGNOSIS — I10 ESSENTIAL HYPERTENSION: ICD-10-CM

## 2021-05-04 DIAGNOSIS — E78.5 HYPERLIPIDEMIA, UNSPECIFIED HYPERLIPIDEMIA TYPE: Primary | ICD-10-CM

## 2021-05-04 DIAGNOSIS — Z00.00 ROUTINE HEALTH MAINTENANCE: ICD-10-CM

## 2021-05-04 PROCEDURE — 99214 OFFICE O/P EST MOD 30 MIN: CPT | Performed by: NURSE PRACTITIONER

## 2021-05-04 RX ORDER — AMLODIPINE BESYLATE 10 MG/1
10 TABLET ORAL DAILY
Qty: 90 TABLET | Refills: 1 | Status: SHIPPED | OUTPATIENT
Start: 2021-05-04 | End: 2021-10-27

## 2021-05-04 NOTE — PROGRESS NOTES
"Chief Complaint  Hypertension and Hyperlipidemia    Subjective          Niesha Bui presents to [facility]  Hypertension  This is a chronic problem. The problem is uncontrolled (Home -140s/80-90s). Pertinent negatives include no chest pain or shortness of breath. Current antihypertension treatment includes ACE inhibitors, calcium channel blockers and diuretics. There are no compliance problems (Reports increased stress recently at work).    Hyperlipidemia  This is a chronic problem. The problem is controlled. Pertinent negatives include no chest pain or shortness of breath. Current antihyperlipidemic treatment includes statins. There are no compliance problems.  Risk factors for coronary artery disease include dyslipidemia, hypertension, obesity and post-menopausal.       Objective   Vital Signs:   /84   Pulse 101   Temp 97.8 °F (36.6 °C) (Temporal)   Ht 165.1 cm (65\")   Wt 87.5 kg (193 lb)   SpO2 98%   BMI 32.12 kg/m²     Physical Exam  Vitals and nursing note reviewed.   Constitutional:       General: She is not in acute distress.     Appearance: She is well-developed. She is not ill-appearing.   Cardiovascular:      Rate and Rhythm: Normal rate and regular rhythm.      Heart sounds: Normal heart sounds, S1 normal and S2 normal. No murmur heard.     Pulmonary:      Effort: Pulmonary effort is normal.      Breath sounds: Normal breath sounds. No decreased breath sounds, wheezing, rhonchi or rales.   Skin:     General: Skin is warm and dry.   Neurological:      Mental Status: She is alert and oriented to person, place, and time.   Psychiatric:         Speech: Speech normal.         Behavior: Behavior normal. Behavior is cooperative.         Thought Content: Thought content normal.         Judgment: Judgment normal.        Result Review :   The following data was reviewed by: ALPHONSO Vasquez on 05/04/2021:  Common labs    Common Labsle 11/5/20 11/5/20    1444 " 1444   Glucose 89    BUN 16    Creatinine 0.74    eGFR Non  Am 82    eGFR African Am 99    Sodium 131 (A)    Potassium 4.0    Chloride 96 (A)    Calcium 9.1    Hemoglobin A1C  5.49   (A) Abnormal value       Comments are available for some flowsheets but are not being displayed.                  Assessment and Plan    Diagnoses and all orders for this visit:    1. Hyperlipidemia, unspecified hyperlipidemia type (Primary)  Check NONfasting lipid panel today.  Adjustment of medication as necessary.  Continue to follow and improve on low-fat, low carbohydrate diet.     -     Lipid Panel With / Chol / HDL Ratio    2. Essential hypertension  Blood pressure remains elevated. Recommend increase amlodipine 10mg daily. Will contact me in 2 weeks with BP readings.  Follow-up 4 months.    Continue lifestyle modifications for high blood pressure, high cholesterol, and increased weight. Decrease/eliminate soda, caffeine, alcohol and overall caloric intake. Reduce carbohydrates and sweets in diet.  Continue to improve dietary habits with lean proteins, fresh vegetables, fruits, and nuts. Improve aerobic exercise: walking/biking/swimming daily as tolerated, recommend 30 minutes/day at least 5 days/week.       -     Comprehensive Metabolic Panel  -     TSH+Free T4  -     CBC & Differential  -     amLODIPine (NORVASC) 10 MG tablet; Take 1 tablet by mouth Daily.  Dispense: 90 tablet; Refill: 1    3. Elevated glucose  -     Comprehensive Metabolic Panel  -     Hemoglobin A1c    4. Routine health maintenance  -     Comprehensive Metabolic Panel  -     Hemoglobin A1c  -     Lipid Panel With / Chol / HDL Ratio  -     TSH+Free T4  -     CBC & Differential  -     Hepatitis C Antibody      Follow Up   Return in about 4 months (around 9/4/2021) for Next scheduled follow up.  Patient was given instructions and counseling regarding her condition or for health maintenance advice. Please see specific information pulled into the AVS if  appropriate.       Answers for HPI/ROS submitted by the patient on 5/2/2021  Please describe your symptoms.: Follow up for BP  Have you had these symptoms before?: Yes  How long have you been having these symptoms?: Greater than 2 weeks  Please list any medications you are currently taking for this condition.: BP prescribed meds  Please describe any probable cause for these symptoms. : Genetic  What is the primary reason for your visit?: Other

## 2021-05-05 LAB
ALBUMIN SERPL-MCNC: 4.6 G/DL (ref 3.5–5.2)
ALBUMIN/GLOB SERPL: 1.8 G/DL
ALP SERPL-CCNC: 88 U/L (ref 39–117)
ALT SERPL-CCNC: 17 U/L (ref 1–33)
AST SERPL-CCNC: 17 U/L (ref 1–32)
BASOPHILS # BLD AUTO: 0.05 10*3/MM3 (ref 0–0.2)
BASOPHILS NFR BLD AUTO: 1 % (ref 0–1.5)
BILIRUB SERPL-MCNC: 0.4 MG/DL (ref 0–1.2)
BUN SERPL-MCNC: 10 MG/DL (ref 6–20)
BUN/CREAT SERPL: 13.5 (ref 7–25)
CALCIUM SERPL-MCNC: 9.9 MG/DL (ref 8.6–10.5)
CHLORIDE SERPL-SCNC: 97 MMOL/L (ref 98–107)
CHOLEST SERPL-MCNC: 229 MG/DL (ref 0–200)
CHOLEST/HDLC SERPL: 2.39 {RATIO}
CO2 SERPL-SCNC: 26.5 MMOL/L (ref 22–29)
CREAT SERPL-MCNC: 0.74 MG/DL (ref 0.57–1)
EOSINOPHIL # BLD AUTO: 0.04 10*3/MM3 (ref 0–0.4)
EOSINOPHIL NFR BLD AUTO: 0.8 % (ref 0.3–6.2)
ERYTHROCYTE [DISTWIDTH] IN BLOOD BY AUTOMATED COUNT: 13.6 % (ref 12.3–15.4)
GLOBULIN SER CALC-MCNC: 2.6 GM/DL
GLUCOSE SERPL-MCNC: 110 MG/DL (ref 65–99)
HBA1C MFR BLD: 5.5 % (ref 4.8–5.6)
HCT VFR BLD AUTO: 43.2 % (ref 34–46.6)
HCV AB S/CO SERPL IA: <0.1 S/CO RATIO (ref 0–0.9)
HDLC SERPL-MCNC: 96 MG/DL (ref 40–60)
HGB BLD-MCNC: 14.3 G/DL (ref 12–15.9)
IMM GRANULOCYTES # BLD AUTO: 0.01 10*3/MM3 (ref 0–0.05)
IMM GRANULOCYTES NFR BLD AUTO: 0.2 % (ref 0–0.5)
LDLC SERPL CALC-MCNC: 121 MG/DL (ref 0–100)
LYMPHOCYTES # BLD AUTO: 0.88 10*3/MM3 (ref 0.7–3.1)
LYMPHOCYTES NFR BLD AUTO: 17.7 % (ref 19.6–45.3)
MCH RBC QN AUTO: 30.4 PG (ref 26.6–33)
MCHC RBC AUTO-ENTMCNC: 33.1 G/DL (ref 31.5–35.7)
MCV RBC AUTO: 91.9 FL (ref 79–97)
MONOCYTES # BLD AUTO: 0.37 10*3/MM3 (ref 0.1–0.9)
MONOCYTES NFR BLD AUTO: 7.4 % (ref 5–12)
NEUTROPHILS # BLD AUTO: 3.62 10*3/MM3 (ref 1.7–7)
NEUTROPHILS NFR BLD AUTO: 72.9 % (ref 42.7–76)
NRBC BLD AUTO-RTO: 0.4 /100 WBC (ref 0–0.2)
PLATELET # BLD AUTO: 271 10*3/MM3 (ref 140–450)
POTASSIUM SERPL-SCNC: 4.4 MMOL/L (ref 3.5–5.2)
PROT SERPL-MCNC: 7.2 G/DL (ref 6–8.5)
RBC # BLD AUTO: 4.7 10*6/MM3 (ref 3.77–5.28)
SODIUM SERPL-SCNC: 137 MMOL/L (ref 136–145)
T4 FREE SERPL-MCNC: 1.09 NG/DL (ref 0.93–1.7)
TRIGL SERPL-MCNC: 70 MG/DL (ref 0–150)
TSH SERPL DL<=0.005 MIU/L-ACNC: 1.51 UIU/ML (ref 0.27–4.2)
VLDLC SERPL CALC-MCNC: 12 MG/DL (ref 5–40)
WBC # BLD AUTO: 4.97 10*3/MM3 (ref 3.4–10.8)

## 2021-06-24 DIAGNOSIS — I10 ESSENTIAL HYPERTENSION: ICD-10-CM

## 2021-06-24 RX ORDER — AMLODIPINE BESYLATE 5 MG/1
TABLET ORAL
Qty: 90 TABLET | Refills: 1 | OUTPATIENT
Start: 2021-06-24

## 2021-07-16 DIAGNOSIS — I10 ESSENTIAL HYPERTENSION: ICD-10-CM

## 2021-07-16 RX ORDER — RAMIPRIL 10 MG/1
CAPSULE ORAL
Qty: 180 CAPSULE | Refills: 1 | Status: SHIPPED | OUTPATIENT
Start: 2021-07-16 | End: 2022-01-10

## 2021-08-09 ENCOUNTER — OFFICE VISIT (OUTPATIENT)
Dept: INTERNAL MEDICINE | Age: 56
End: 2021-08-09

## 2021-08-09 VITALS
OXYGEN SATURATION: 100 % | WEIGHT: 196 LBS | BODY MASS INDEX: 32.62 KG/M2 | TEMPERATURE: 97.3 F | HEART RATE: 87 BPM | DIASTOLIC BLOOD PRESSURE: 80 MMHG | SYSTOLIC BLOOD PRESSURE: 138 MMHG

## 2021-08-09 DIAGNOSIS — J02.9 PHARYNGITIS, UNSPECIFIED ETIOLOGY: Primary | ICD-10-CM

## 2021-08-09 LAB
EXPIRATION DATE: NORMAL
INTERNAL CONTROL: NORMAL
Lab: NORMAL
S PYO AG THROAT QL: NEGATIVE

## 2021-08-09 PROCEDURE — 87880 STREP A ASSAY W/OPTIC: CPT | Performed by: NURSE PRACTITIONER

## 2021-08-09 PROCEDURE — 99213 OFFICE O/P EST LOW 20 MIN: CPT | Performed by: NURSE PRACTITIONER

## 2021-08-09 RX ORDER — CETIRIZINE HYDROCHLORIDE 10 MG/1
10 TABLET ORAL DAILY
COMMUNITY
Start: 2021-08-06 | End: 2021-10-21

## 2021-08-09 RX ORDER — AZITHROMYCIN 250 MG/1
TABLET, FILM COATED ORAL SEE ADMIN INSTRUCTIONS
COMMUNITY
Start: 2021-08-06 | End: 2021-10-21

## 2021-08-09 NOTE — PROGRESS NOTES
Chief Complaint  Sore Throat    Subjective          Niesha Bui presents to [facility]  Sore Throat   This is a new problem. Episode onset: 1 week ago. The problem has been gradually worsening. There has been no fever. Pertinent negatives include no coughing, ear pain or headaches. She has had no exposure to strep or mono. Treatments tried: Azithromycin, Zyrtec.   She went to [facility] urgent care approximately 3 days ago and was tested for both strep and COVID, which were negative.  At that point, she had been having symptoms for about 4 days.     Objective   Vital Signs:   /80   Pulse 87   Temp 97.3 °F (36.3 °C) (Temporal)   Wt 88.9 kg (196 lb)   SpO2 100%   BMI 32.62 kg/m²     Physical Exam  Vitals and nursing note reviewed.   Constitutional:       General: She is not in acute distress.     Appearance: She is well-developed. She is not ill-appearing.   HENT:      Mouth/Throat:      Pharynx: Oropharyngeal exudate and posterior oropharyngeal erythema present.   Cardiovascular:      Rate and Rhythm: Normal rate and regular rhythm.      Heart sounds: Normal heart sounds, S1 normal and S2 normal. No murmur heard.     Pulmonary:      Effort: Pulmonary effort is normal.      Breath sounds: Normal breath sounds. No decreased breath sounds, wheezing, rhonchi or rales.   Lymphadenopathy:      Cervical: No cervical adenopathy.   Skin:     General: Skin is warm and dry.   Neurological:      Mental Status: She is alert and oriented to person, place, and time.   Psychiatric:         Speech: Speech normal.         Behavior: Behavior normal. Behavior is cooperative.         Thought Content: Thought content normal.         Judgment: Judgment normal.        Result Review :   The following data was reviewed by: ALPHONSO Vasquez on 08/09/2021:  Common labs    Common Labsle 11/5/20 11/5/20 5/4/21 5/4/21 5/4/21 5/4/21    1444 1444 1544 1544 1544 1544   Glucose 89  110 (A)      BUN 16  10       Creatinine 0.74  0.74      eGFR Non  Am 82  82      eGFR African Am 99  99      Sodium 131 (A)  137      Potassium 4.0  4.4      Chloride 96 (A)  97 (A)      Calcium 9.1  9.9      Total Protein   7.2      Albumin   4.60      Total Bilirubin   0.4      Alkaline Phosphatase   88      AST (SGOT)   17      ALT (SGPT)   17      WBC      4.97   Hemoglobin      14.3   Hematocrit      43.2   Platelets      271   Total Cholesterol     229 (A)    Triglycerides     70    HDL Cholesterol     96 (A)    LDL Cholesterol      121 (A)    Hemoglobin A1C  5.49  5.50     (A) Abnormal value       Comments are available for some flowsheets but are not being displayed.                  Assessment and Plan    Diagnoses and all orders for this visit:    1. Pharyngitis, unspecified etiology (Primary)  Likely viral pharyngitis. Will send throat culture for verification. Finish antibiotic and conservative treatment with OTC NSAIDs.   Magic mouthwash RX for symptomatic treatment.     -     POC Rapid Strep A  -     Miracle mouthwash oral suspension; Swish and spit 5 mL Every 6 (Six) Hours As Needed for Mucositis or Stomatitis.  Dispense: 180 mL; Refill: 0  -     Beta Strep Culture, Throat - Swab, Throat        Follow Up   Return for 1 week as needed if no improvement .  Patient was given instructions and counseling regarding her condition or for health maintenance advice. Please see specific information pulled into the AVS if appropriate.

## 2021-08-12 LAB — S PYO THROAT QL CULT: NEGATIVE

## 2021-08-17 DIAGNOSIS — I10 ESSENTIAL HYPERTENSION: ICD-10-CM

## 2021-08-17 RX ORDER — HYDROCHLOROTHIAZIDE 12.5 MG/1
CAPSULE, GELATIN COATED ORAL
Qty: 90 CAPSULE | Refills: 1 | Status: SHIPPED | OUTPATIENT
Start: 2021-08-17 | End: 2021-10-15

## 2021-10-14 DIAGNOSIS — I10 ESSENTIAL HYPERTENSION: ICD-10-CM

## 2021-10-15 RX ORDER — HYDROCHLOROTHIAZIDE 12.5 MG/1
CAPSULE, GELATIN COATED ORAL
Qty: 90 CAPSULE | Refills: 1 | Status: SHIPPED | OUTPATIENT
Start: 2021-10-15 | End: 2022-05-27

## 2021-10-20 DIAGNOSIS — E78.5 HYPERLIPIDEMIA, UNSPECIFIED HYPERLIPIDEMIA TYPE: ICD-10-CM

## 2021-10-20 RX ORDER — PRAVASTATIN SODIUM 40 MG
40 TABLET ORAL DAILY
Qty: 90 TABLET | Refills: 3 | Status: SHIPPED | OUTPATIENT
Start: 2021-10-20 | End: 2022-09-22 | Stop reason: SDUPTHER

## 2021-10-21 ENCOUNTER — OFFICE VISIT (OUTPATIENT)
Dept: INTERNAL MEDICINE | Age: 56
End: 2021-10-21

## 2021-10-21 VITALS
TEMPERATURE: 97.3 F | HEART RATE: 93 BPM | SYSTOLIC BLOOD PRESSURE: 154 MMHG | HEIGHT: 65 IN | DIASTOLIC BLOOD PRESSURE: 88 MMHG | OXYGEN SATURATION: 98 % | BODY MASS INDEX: 33.82 KG/M2 | WEIGHT: 203 LBS

## 2021-10-21 DIAGNOSIS — I10 ESSENTIAL HYPERTENSION: Primary | ICD-10-CM

## 2021-10-21 DIAGNOSIS — R73.09 ELEVATED GLUCOSE: ICD-10-CM

## 2021-10-21 DIAGNOSIS — E78.5 HYPERLIPIDEMIA, UNSPECIFIED HYPERLIPIDEMIA TYPE: ICD-10-CM

## 2021-10-21 PROCEDURE — 99214 OFFICE O/P EST MOD 30 MIN: CPT | Performed by: NURSE PRACTITIONER

## 2021-10-21 NOTE — PROGRESS NOTES
"Chief Complaint  Hypertension and Hyperlipidemia    Subjective          Niesha Bui presents to [facility]  Hypertension  This is a chronic problem. The current episode started more than 1 year ago. The problem is unchanged. The problem is controlled (BP readings at home controlled, has history white coat HTN). Pertinent negatives include no anxiety, blurred vision, chest pain, headaches, peripheral edema, PND, shortness of breath or sweats. Current antihypertension treatment includes calcium channel blockers, diuretics and ACE inhibitors. Compliance problems include diet.    Hyperlipidemia  This is a chronic problem. The problem is controlled. Exacerbating diseases include obesity. Pertinent negatives include no chest pain or shortness of breath. Current antihyperlipidemic treatment includes statins. Risk factors for coronary artery disease include dyslipidemia, hypertension and post-menopausal.       Objective   Vital Signs:   /88   Pulse 93   Temp 97.3 °F (36.3 °C) (Temporal)   Ht 165.1 cm (65\")   Wt 92.1 kg (203 lb)   SpO2 98%   BMI 33.78 kg/m²     Physical Exam  Vitals and nursing note reviewed.   Constitutional:       General: She is not in acute distress.     Appearance: She is well-developed. She is not ill-appearing.   Cardiovascular:      Rate and Rhythm: Normal rate and regular rhythm.      Heart sounds: Normal heart sounds, S1 normal and S2 normal. No murmur heard.      Pulmonary:      Effort: Pulmonary effort is normal.      Breath sounds: Normal breath sounds. No decreased breath sounds, wheezing, rhonchi or rales.   Skin:     General: Skin is warm and dry.   Neurological:      Mental Status: She is alert and oriented to person, place, and time.   Psychiatric:         Speech: Speech normal.         Behavior: Behavior normal. Behavior is cooperative.         Thought Content: Thought content normal.         Judgment: Judgment normal.        Result Review : "   The following data was reviewed by: ALPHONSO Vasquez on 10/21/2021:  Common labs    Common Labsle 11/5/20 11/5/20 5/4/21 5/4/21 5/4/21 5/4/21    1444 1444 1544 1544 1544 1544   Glucose 89  110 (A)      BUN 16  10      Creatinine 0.74  0.74      eGFR Non  Am 82  82      eGFR African Am 99  99      Sodium 131 (A)  137      Potassium 4.0  4.4      Chloride 96 (A)  97 (A)      Calcium 9.1  9.9      Total Protein   7.2      Albumin   4.60      Total Bilirubin   0.4      Alkaline Phosphatase   88      AST (SGOT)   17      ALT (SGPT)   17      WBC      4.97   Hemoglobin      14.3   Hematocrit      43.2   Platelets      271   Total Cholesterol     229 (A)    Triglycerides     70    HDL Cholesterol     96 (A)    LDL Cholesterol      121 (A)    Hemoglobin A1C  5.49  5.50     (A) Abnormal value       Comments are available for some flowsheets but are not being displayed.                  Assessment and Plan    Diagnoses and all orders for this visit:    1. Essential hypertension (Primary)  Blood pressure stable on current therapy, continue same.  Check labs today and adjust dosage of medication as necessary.  Follow-up 4 months.      2. Elevated glucose  Lab Results   Component Value Date    HGBA1C 5.50 05/04/2021         3. Hyperlipidemia, unspecified hyperlipidemia type  Stable on current therapy. Fasting lipid panel UTD and values within acceptable ranges.   Continue to follow and improve on low-fat, low carbohydrate diet.     Lab Results   Component Value Date    CHLPL 229 (H) 05/04/2021    TRIG 70 05/04/2021    HDL 96 (H) 05/04/2021     (H) 05/04/2021       Follow Up   Return in about 4 months (around 2/21/2022) for Next scheduled follow up (fasting labs) .  Patient was given instructions and counseling regarding her condition or for health maintenance advice. Please see specific information pulled into the AVS if appropriate.

## 2021-10-27 DIAGNOSIS — I10 ESSENTIAL HYPERTENSION: ICD-10-CM

## 2021-10-27 RX ORDER — AMLODIPINE BESYLATE 10 MG/1
TABLET ORAL
Qty: 90 TABLET | Refills: 1 | Status: SHIPPED | OUTPATIENT
Start: 2021-10-27 | End: 2022-06-06 | Stop reason: SDUPTHER

## 2022-01-07 DIAGNOSIS — I10 ESSENTIAL HYPERTENSION: ICD-10-CM

## 2022-01-10 RX ORDER — RAMIPRIL 10 MG/1
CAPSULE ORAL
Qty: 180 CAPSULE | Refills: 1 | Status: SHIPPED | OUTPATIENT
Start: 2022-01-10 | End: 2022-07-05

## 2022-02-14 RX ORDER — ESCITALOPRAM OXALATE 10 MG/1
TABLET ORAL
Qty: 90 TABLET | Refills: 4 | Status: SHIPPED | OUTPATIENT
Start: 2022-02-14 | End: 2022-03-28 | Stop reason: SDUPTHER

## 2022-02-21 ENCOUNTER — OFFICE VISIT (OUTPATIENT)
Dept: INTERNAL MEDICINE | Age: 57
End: 2022-02-21

## 2022-02-21 VITALS
BODY MASS INDEX: 31.89 KG/M2 | HEART RATE: 80 BPM | WEIGHT: 191.4 LBS | SYSTOLIC BLOOD PRESSURE: 134 MMHG | DIASTOLIC BLOOD PRESSURE: 76 MMHG | OXYGEN SATURATION: 98 % | TEMPERATURE: 97.5 F | HEIGHT: 65 IN

## 2022-02-21 DIAGNOSIS — Z23 NEED FOR SHINGLES VACCINE: Primary | ICD-10-CM

## 2022-02-21 DIAGNOSIS — N95.1 MENOPAUSAL HOT FLUSHES: ICD-10-CM

## 2022-02-21 DIAGNOSIS — E78.5 HYPERLIPIDEMIA, UNSPECIFIED HYPERLIPIDEMIA TYPE: ICD-10-CM

## 2022-02-21 DIAGNOSIS — I10 WHITE COAT SYNDROME WITH DIAGNOSIS OF HYPERTENSION: ICD-10-CM

## 2022-02-21 PROCEDURE — 99214 OFFICE O/P EST MOD 30 MIN: CPT | Performed by: NURSE PRACTITIONER

## 2022-02-21 PROCEDURE — 90750 HZV VACC RECOMBINANT IM: CPT | Performed by: NURSE PRACTITIONER

## 2022-02-21 PROCEDURE — 90471 IMMUNIZATION ADMIN: CPT | Performed by: NURSE PRACTITIONER

## 2022-02-21 NOTE — PROGRESS NOTES
"    I N T E R N A L  M E D I C I N E  SETH DEE, APRN      ENCOUNTER DATE:  02/21/2022    Niesha Bui / 56 y.o. / female      CHIEF COMPLAINT / REASON FOR OFFICE VISIT     Establish Care      ASSESSMENT & PLAN     1. White coat syndrome with diagnosis of hypertension  -Well-controlled at home in the 120s to low 130s over 70s.  -Continue hydrochlorothiazide 12.5 mg daily, amlodipine 10 mg and ramipril 10 mg daily    2. Hyperlipidemia, unspecified hyperlipidemia type  -Continue pravastatin 40 mg    3. Menopausal hot flushes  -Lexapro 10 mg as prescribed by OB/GYN  -Taking vitamin D postmenopausal    4. Need for shingles vaccine  - Shingrix Vaccine    Orders Placed This Encounter   Procedures   • Shingrix Vaccine     No orders of the defined types were placed in this encounter.      SUMMARY/DISCUSSION  • Follow-up in 3 months for annual physical and chronic medical, earlier if needed we will update fasting labs at that time  • Second Shingrix at May appointment    Next Appointment with me: 5/27/2022    Return in about 3 months (around 5/21/2022) for Annual physical; labs week before, may combine two 15 minute appointments .      VITAL SIGNS     Visit Vitals  /76   Pulse 80   Temp 97.5 °F (36.4 °C) (Temporal)   Ht 165.1 cm (65\")   Wt 86.8 kg (191 lb 6.4 oz)   LMP 03/07/2018 (Exact Date)   SpO2 98%   BMI 31.85 kg/m²     Wt Readings from Last 3 Encounters:   02/21/22 86.8 kg (191 lb 6.4 oz)   10/21/21 92.1 kg (203 lb)   08/09/21 88.9 kg (196 lb)     Body mass index is 31.85 kg/m².      MEDICATIONS AT THE TIME OF OFFICE VISIT     Current Outpatient Medications on File Prior to Visit   Medication Sig   • amLODIPine (NORVASC) 10 MG tablet TAKE 1 TABLET BY MOUTH EVERY DAY   • escitalopram (LEXAPRO) 10 MG tablet TAKE 1 TABLET BY MOUTH EVERY DAY   • hydroCHLOROthiazide (MICROZIDE) 12.5 MG capsule TAKE 1 CAPSULE DAILY   • Multiple Minerals-Vitamins (CALCIUM CITRATE PLUS/MAGNESIUM) tablet Take  by mouth.   • " pravastatin (PRAVACHOL) 40 MG tablet TAKE 1 TABLET BY MOUTH DAILY. PT NEEDS APPT FOR FURTHER REFILLS   • ramipril (ALTACE) 10 MG capsule TAKE 2 CAPSULES DAILY   • Vitamin D, Cholecalciferol, 1000 units capsule Take  by mouth.     No current facility-administered medications on file prior to visit.         HISTORY OF PRESENT ILLNESS     Patient presents to establish care with new provider in office.  Deviously followed for hypertension with history of whitecoat hypertension in office, hyperlipidemia and menopausal hot flashes.    Hypertension is well controlled at home in the high 120s over 70s on average with ramipril 10, amlodipine 10 and hydrochlorothiazide 12.5 mg daily. Denies any chest pain, shortness of air, headache, visual disturbances, lower extremity leg swelling, or heart palpitations.     Hyperlipidemia LDL still 121 pravastatin 40 mg daily.  If still elevated in 3 months may consider switching to high-dose statin.  No myalgias with medication.  No elevation of liver enzymes.    Postmenopausal taking Lexapro 10 for hot flashes which is well controlling symptoms.  Vitamin D supplementation of 1000 units daily.    Need for shingles vaccine today.    Followed by OB/GYN for routine mammogram Pap smear which she is up-to-date.    REVIEW OF SYSTEMS     Constitutional neg except per HPI   Resp neg  CV neg    PHYSICAL EXAMINATION     Physical Exam  Constitutional  No distress  Cardiovascular Rate  normal . Rhythm: regular . Heart sounds:  normal  Pulmonary/Chest  Effort normal. Breath sounds:  normal  Psychiatric  Alert. Judgment and thought content normal. Mood normal     REVIEWED DATA     Labs:   Lab Results   Component Value Date    GLUCOSE 110 (H) 05/04/2021    BUN 10 05/04/2021    CREATININE 0.74 05/04/2021    EGFRIFNONA 82 05/04/2021    EGFRIFAFRI 99 05/04/2021    BCR 13.5 05/04/2021    K 4.4 05/04/2021    CO2 26.5 05/04/2021    CALCIUM 9.9 05/04/2021    PROTENTOTREF 7.2 05/04/2021    ALBUMIN 4.60  05/04/2021    LABIL2 1.8 05/04/2021    AST 17 05/04/2021    ALT 17 05/04/2021     Lab Results   Component Value Date    CHLPL 229 (H) 05/04/2021    TRIG 70 05/04/2021    HDL 96 (H) 05/04/2021     (H) 05/04/2021     Lab Results   Component Value Date    HGBA1C 5.50 05/04/2021     Lab Results   Component Value Date    TSH 1.510 05/04/2021     Lab Results   Component Value Date    WBC 4.97 05/04/2021    HGB 14.3 05/04/2021    HCT 43.2 05/04/2021    MCV 91.9 05/04/2021     05/04/2021     Imaging:           Medical Tests:             Summary of old records / correspondence / consultant report:           Request outside records:           *Examiner was wearing medical surgical mask, face shield and exam gloves during the entire duration of the visit. Patient was masked the entire time.   Minimum social distance of 6 ft maintained entire visit except if physical contact was necessary as documented.     Dictated utilizing Dragon dictation

## 2022-02-23 ENCOUNTER — TELEPHONE (OUTPATIENT)
Dept: INTERNAL MEDICINE | Age: 57
End: 2022-02-23

## 2022-02-23 ENCOUNTER — PATIENT ROUNDING (BHMG ONLY) (OUTPATIENT)
Dept: INTERNAL MEDICINE | Age: 57
End: 2022-02-23

## 2022-02-23 NOTE — PROGRESS NOTES
A My-Chart message has been sent to the patient for PATIENT ROUNDING with [facility]

## 2022-02-23 NOTE — TELEPHONE ENCOUNTER
----- Message from Niesha Bui sent at 2/23/2022 12:10 PM EST -----  Regarding: Welcome  Hi!  I was worried about changing my provider again.  When  left, the transistion to [facility] was perfect.  The transistion to Raul was also perfect.  The office staff is always so courteous and friendly.    Raul was so easy to talk too and work with.    Thank you for asking my opinion.    Keep up the great work!    Niesha Bui

## 2022-03-28 ENCOUNTER — PROCEDURE VISIT (OUTPATIENT)
Dept: OBSTETRICS AND GYNECOLOGY | Facility: CLINIC | Age: 57
End: 2022-03-28

## 2022-03-28 ENCOUNTER — APPOINTMENT (OUTPATIENT)
Dept: WOMENS IMAGING | Facility: HOSPITAL | Age: 57
End: 2022-03-28

## 2022-03-28 ENCOUNTER — OFFICE VISIT (OUTPATIENT)
Dept: OBSTETRICS AND GYNECOLOGY | Facility: CLINIC | Age: 57
End: 2022-03-28

## 2022-03-28 VITALS
WEIGHT: 189 LBS | HEART RATE: 96 BPM | SYSTOLIC BLOOD PRESSURE: 160 MMHG | DIASTOLIC BLOOD PRESSURE: 102 MMHG | HEIGHT: 65 IN | BODY MASS INDEX: 31.49 KG/M2

## 2022-03-28 DIAGNOSIS — Z01.419 WELL WOMAN EXAM WITH ROUTINE GYNECOLOGICAL EXAM: Primary | ICD-10-CM

## 2022-03-28 DIAGNOSIS — Z12.31 VISIT FOR SCREENING MAMMOGRAM: Primary | ICD-10-CM

## 2022-03-28 PROCEDURE — 77067 SCR MAMMO BI INCL CAD: CPT | Performed by: RADIOLOGY

## 2022-03-28 PROCEDURE — 77063 BREAST TOMOSYNTHESIS BI: CPT | Performed by: RADIOLOGY

## 2022-03-28 PROCEDURE — 77067 SCR MAMMO BI INCL CAD: CPT | Performed by: OBSTETRICS & GYNECOLOGY

## 2022-03-28 PROCEDURE — 77063 BREAST TOMOSYNTHESIS BI: CPT | Performed by: OBSTETRICS & GYNECOLOGY

## 2022-03-28 PROCEDURE — 99396 PREV VISIT EST AGE 40-64: CPT | Performed by: OBSTETRICS & GYNECOLOGY

## 2022-03-28 RX ORDER — ESCITALOPRAM OXALATE 10 MG/1
10 TABLET ORAL DAILY
Qty: 90 TABLET | Refills: 4 | Status: SHIPPED | OUTPATIENT
Start: 2022-03-28

## 2022-03-28 NOTE — PROGRESS NOTES
Subjective   Niesha Bui is a 56 y.o. female   Chief Complaint   Patient presents with   • Gynecologic Exam     Patient is here for a annual.       Niesha Biu is a 56 y.o.  who presents for an annual examination     Pap history:  Last pap: 16 NIL, HPV negative  Prior abnormal paps: yes, years and years ago  STDs  Sexually active: yes - newly   History of STDs: no  Contraception:  No menses since coming off oral contraceptive pills in 2018  Colonoscopy: 2017  Mammogram: 23   Menopause: around 51yo, after coming off oral contraceptive pill  VMS: yes, better on lexapro    History of physical abuse: no, History of sexual abuse: no and History of verbal/emotional abuse: no    Screening for BRCA-   Is patient's family history significant for any of the following?   no  For non-[employer]azi [employer] women:   Two first-degree relatives with breast cancer, one of whom was diagnosed at age 50 or younger   A combination of three or more first or second-degree relatives with breast cancer regardless of age at diagnosis   A combination of both breast and ovarian cancer among first and second-degree relatives   A first-degree relative with bilateral breast cancer   A combination of two or more first or second degree relatives with ovarian cancer, regardless of age at diagnosis   A first or second-degree relative with both breast and ovarian cancer at any age   History of breast cancer in a male relative   For women of [employer]azi [employer] descent:   Any first-degree relative (or two second degree relatives on the same side of the family) with breast or ovarian cancer   Recommendations from the [place] Preventive Services Task Force on who should be offered genetic testing for BRCA mutations*     Past Medical History:   Diagnosis Date   • Adiposity    • Allergic Sulfa   • Essential hypertension    • Hyperlipidemia    • Obesity (BMI 30-39.9)    • UTI (urinary tract infection)      Past Surgical  History:   Procedure Laterality Date   • COLONOSCOPY  2017    Good   • TONSILLECTOMY  1972   • WISDOM TOOTH EXTRACTION  2000     Social History     Tobacco Use   • Smoking status: Never Smoker   • Smokeless tobacco: Never Used   Vaping Use   • Vaping Use: Never used   Substance Use Topics   • Alcohol use: Yes     Alcohol/week: 2.0 - 4.0 standard drinks     Types: 2 - 4 Glasses of wine per week     Comment: Occasional social drinks   • Drug use: No     Family History   Problem Relation Age of Onset   • Coronary artery disease Other         MI   • Early death Sister    • ALS Sister    • Hypertension Mother    • Heart disease Mother    • Heart attack Mother    • Breast cancer Neg Hx    • Ovarian cancer Neg Hx    • Uterine cancer Neg Hx    • Colon cancer Neg Hx      Current Outpatient Medications on File Prior to Visit   Medication Sig Dispense Refill   • amLODIPine (NORVASC) 10 MG tablet TAKE 1 TABLET BY MOUTH EVERY DAY 90 tablet 1   • escitalopram (LEXAPRO) 10 MG tablet TAKE 1 TABLET BY MOUTH EVERY DAY 90 tablet 4   • hydroCHLOROthiazide (MICROZIDE) 12.5 MG capsule TAKE 1 CAPSULE DAILY 90 capsule 1   • Multiple Minerals-Vitamins (CALCIUM CITRATE PLUS/MAGNESIUM) tablet Take  by mouth.     • pravastatin (PRAVACHOL) 40 MG tablet TAKE 1 TABLET BY MOUTH DAILY. PT NEEDS APPT FOR FURTHER REFILLS 90 tablet 3   • ramipril (ALTACE) 10 MG capsule TAKE 2 CAPSULES DAILY 180 capsule 1   • Vitamin D, Cholecalciferol, 1000 units capsule Take  by mouth.       No current facility-administered medications on file prior to visit.     Allergies   Allergen Reactions   • Sulfa Antibiotics Hives   • Chlorthalidone Diarrhea        Review of Systems   Constitutional: Negative for chills, fever and unexpected weight change.   HENT: Negative for congestion, nosebleeds and sore throat.    Eyes: Negative for visual disturbance.   Respiratory: Negative for cough, chest tightness and shortness of breath.    Cardiovascular: Negative for chest pain  "and palpitations.   Gastrointestinal: Negative for abdominal pain, constipation, diarrhea, nausea and vomiting.   Endocrine: Negative for polyuria.   Genitourinary: Negative for difficulty urinating, dyspareunia, dysuria, frequency, genital sores, hematuria, menstrual problem, pelvic pain, urgency, vaginal bleeding, vaginal discharge and vaginal pain.   Musculoskeletal: Negative for arthralgias and joint swelling.   Skin: Negative for color change and rash.   Neurological: Negative for dizziness, light-headedness and headaches.   Hematological: Does not bruise/bleed easily.   Psychiatric/Behavioral: Negative for dysphoric mood. The patient is not nervous/anxious.        Objective    /98   Ht 165.1 cm (65\")   Wt 85.7 kg (189 lb)   LMP 03/07/2018 (Exact Date)   Breastfeeding No   BMI 31.45 kg/m²    Physical Exam   Constitutional: She is oriented to person, place, and time. She appears well-developed. No distress.   HENT:   Head: Normocephalic and atraumatic.   Neck: No tracheal deviation present. No thyromegaly present.   Cardiovascular: Normal rate, regular rhythm and normal heart sounds. Exam reveals no gallop and no friction rub.   No murmur heard.  Pulmonary/Chest: Effort normal and breath sounds normal. No respiratory distress. She has no wheezes. She has no rales. She exhibits no tenderness. Right breast exhibits no inverted nipple, no mass, no nipple discharge, no skin change and no tenderness. Left breast exhibits no inverted nipple, no mass, no nipple discharge, no skin change and no tenderness.   Abdominal: Soft. She exhibits no distension and no mass. There is no abdominal tenderness.   Genitourinary: There is no rash, lesion or injury on the right labia. There is no rash, lesion or injury on the left labia. Uterus is not deviated, not enlarged, not fixed and not tender. Cervix exhibits no motion tenderness, no discharge and no friability. Right adnexum displays no mass, no tenderness and no " fullness. Left adnexum displays no mass, no tenderness and no fullness.    No vaginal discharge, tenderness or bleeding.   No tenderness or bleeding in the vagina.   Musculoskeletal: Normal range of motion. No deformity.   Lymphadenopathy:     She has no cervical adenopathy.   Neurological: She is alert and oriented to person, place, and time.   Skin: Skin is warm and dry. No rash noted. She is not diaphoretic.   Psychiatric: Her behavior is normal. Judgment and thought content normal.         Assessment/Plan   Diagnoses and all orders for this visit:    1. Well woman exam with routine gynecological exam (Primary)        Well woman counseling/screening:    Cervical cancer screening:    Reports remote h/o cervical dysplasia  The patient is due for a pap today.    Screening guidelines discussed with patient  Breast cancer screening:    Clinical breast exam recommended for age 20-39 years every 1-3 years   Mammogram recommended starting age 40, done today   Breast self awareness encouraged  STD Screening   Declined  VMS of menopause:   Would like to continue Lexapro, Rx sent.  She feels that mood is great and hot flashes well controlled.   Family history    does not demonstrate need for genetics referral   Healthy lifestyle counseling:   Body mass index is 31.45 kg/m². Lost 12lbs since last annual  Colonoscopy   2017, due in 2027    Hypertension:  Patient has had elevations - follows up with PCP. Takes BP at home and reports that it was 120s/70s this AM.

## 2022-04-01 LAB
CYTOLOGIST CVX/VAG CYTO: NORMAL
CYTOLOGY CVX/VAG DOC CYTO: NORMAL
CYTOLOGY CVX/VAG DOC THIN PREP: NORMAL
DX ICD CODE: NORMAL
HIV 1 & 2 AB SER-IMP: NORMAL
HPV I/H RISK 4 DNA CVX QL PROBE+SIG AMP: NEGATIVE
OTHER STN SPEC: NORMAL
STAT OF ADQ CVX/VAG CYTO-IMP: NORMAL

## 2022-05-13 DIAGNOSIS — Z00.00 HEALTHCARE MAINTENANCE: Primary | ICD-10-CM

## 2022-05-13 DIAGNOSIS — E78.5 HYPERLIPIDEMIA, UNSPECIFIED HYPERLIPIDEMIA TYPE: ICD-10-CM

## 2022-05-13 DIAGNOSIS — I10 ESSENTIAL HYPERTENSION: ICD-10-CM

## 2022-05-24 ENCOUNTER — TELEPHONE (OUTPATIENT)
Dept: INTERNAL MEDICINE | Age: 57
End: 2022-05-24

## 2022-05-24 NOTE — TELEPHONE ENCOUNTER
----- Message from ALPHONSO Aldana sent at 5/23/2022  4:38 PM EDT -----  Letter only.    All labs are in acceptable ranges except for continued mild elevation in cholesterol.  Please limit saturated fats, sweets and carbohydrates from diet.  Increase exercise as tolerated and continue pravastatin.  Mild elevation in blood sugar, again please limit simple carbohydrates and sweets.

## 2022-05-27 ENCOUNTER — OFFICE VISIT (OUTPATIENT)
Dept: INTERNAL MEDICINE | Age: 57
End: 2022-05-27

## 2022-05-27 VITALS
OXYGEN SATURATION: 98 % | HEART RATE: 88 BPM | SYSTOLIC BLOOD PRESSURE: 134 MMHG | WEIGHT: 191.6 LBS | DIASTOLIC BLOOD PRESSURE: 80 MMHG | BODY MASS INDEX: 31.92 KG/M2 | TEMPERATURE: 97.3 F | HEIGHT: 65 IN

## 2022-05-27 DIAGNOSIS — E66.01 MORBID (SEVERE) OBESITY DUE TO EXCESS CALORIES: ICD-10-CM

## 2022-05-27 DIAGNOSIS — Z00.00 ENCOUNTER FOR ANNUAL PHYSICAL EXAM: Primary | ICD-10-CM

## 2022-05-27 DIAGNOSIS — I10 WHITE COAT SYNDROME WITH DIAGNOSIS OF HYPERTENSION: ICD-10-CM

## 2022-05-27 DIAGNOSIS — E78.5 HYPERLIPIDEMIA, UNSPECIFIED HYPERLIPIDEMIA TYPE: ICD-10-CM

## 2022-05-27 PROCEDURE — 99214 OFFICE O/P EST MOD 30 MIN: CPT | Performed by: NURSE PRACTITIONER

## 2022-05-27 PROCEDURE — 90472 IMMUNIZATION ADMIN EACH ADD: CPT | Performed by: NURSE PRACTITIONER

## 2022-05-27 PROCEDURE — 90471 IMMUNIZATION ADMIN: CPT | Performed by: NURSE PRACTITIONER

## 2022-05-27 PROCEDURE — 99396 PREV VISIT EST AGE 40-64: CPT | Performed by: NURSE PRACTITIONER

## 2022-05-27 PROCEDURE — 90714 TD VACC NO PRESV 7 YRS+ IM: CPT | Performed by: NURSE PRACTITIONER

## 2022-05-27 PROCEDURE — 90750 HZV VACC RECOMBINANT IM: CPT | Performed by: NURSE PRACTITIONER

## 2022-05-27 RX ORDER — HYDROCHLOROTHIAZIDE 25 MG/1
25 TABLET ORAL DAILY
Qty: 90 TABLET | Refills: 3 | Status: SHIPPED | OUTPATIENT
Start: 2022-05-27 | End: 2023-03-06 | Stop reason: SDUPTHER

## 2022-05-27 NOTE — PROGRESS NOTES
"[facility] [place] INTERNAL MEDICINE  ALPHONSO Aldana      Niesha SLATER Ramya / 56 y.o. / female  05/27/2022      VITALS     Visit Vitals  /80   Pulse 88   Temp 97.3 °F (36.3 °C) (Temporal)   Ht 165.1 cm (65\")   Wt 86.9 kg (191 lb 9.6 oz)   LMP 03/07/2018 (Exact Date)   SpO2 98%   BMI 31.88 kg/m²       BP Readings from Last 3 Encounters:   05/27/22 134/80   03/28/22 (!) 160/102   02/21/22 134/76     Wt Readings from Last 3 Encounters:   05/27/22 86.9 kg (191 lb 9.6 oz)   03/28/22 85.7 kg (189 lb)   02/21/22 86.8 kg (191 lb 6.4 oz)      Body mass index is 31.88 kg/m².    MEDICATIONS     Current Outpatient Medications   Medication Sig Dispense Refill   • amLODIPine (NORVASC) 10 MG tablet TAKE 1 TABLET BY MOUTH EVERY DAY (Patient taking differently: Take 5 mg by mouth Daily.) 90 tablet 1   • escitalopram (LEXAPRO) 10 MG tablet Take 1 tablet by mouth Daily. 90 tablet 4   • Multiple Minerals-Vitamins (CALCIUM CITRATE PLUS/MAGNESIUM) tablet Take  by mouth.     • pravastatin (PRAVACHOL) 40 MG tablet TAKE 1 TABLET BY MOUTH DAILY. PT NEEDS APPT FOR FURTHER REFILLS 90 tablet 3   • ramipril (ALTACE) 10 MG capsule TAKE 2 CAPSULES DAILY 180 capsule 1   • Vitamin D, Cholecalciferol, 1000 units capsule Take  by mouth.     • hydroCHLOROthiazide (HYDRODIURIL) 25 MG tablet Take 1 tablet by mouth Daily. 90 tablet 3     No current facility-administered medications for this visit.       _____________________________________________________________________________________    CHIEF COMPLAINT     Annual Exam, Hyperlipidemia, and Hypertension      HISTORY OF PRESENT ILLNESS     Niesha presents for annual health maintenance visit.    · Last health maintenance visit: approximately 1 year ago  · General health: good  · Lifestyle:  · Attempting to lose weight?: Yes   · Diet: eats moderately healthy  · Exercise: walks regularly  · Tobacco: Never used   · Alcohol: occasional/infrequent  · Work: Full-time  · Reproductive health:  · Sexually active?: Yes "   · Sexual problems?: No problems  · Concern for STD?: No    · Sees Gynecologist?: Yes   · Cesia/Postmenopausal?: Yes   · Domestic abuse concerns: No   · Depression Screening:      PHQ-2/PHQ-9 Depression Screening 5/27/2022   Retired PHQ-9 Total Score -   Retired Total Score -   Little Interest or Pleasure in Doing Things 0-->not at all   Feeling Down, Depressed or Hopeless 0-->not at all   PHQ-9: Brief Depression Severity Measure Score 0         PHQ-2: 0 (Not depressed)   PHQ-9: 0 (Negative screening for depression)    Patient Care Team:  Raul Farley APRN as PCP - General (Internal Medicine)  Kesha Ellison MD as Consulting Physician (Obstetrics and Gynecology)  ______________________________________________________________________    ALLERGIES  Allergies   Allergen Reactions   • Sulfa Antibiotics Hives   • Chlorthalidone Diarrhea        PFSH:     The following portions of the patient's history were reviewed and updated as appropriate: Allergies / Current Medications / Past Medical History / Surgical History / Social History / Family History    PROBLEM LIST   Patient Active Problem List   Diagnosis   • Essential hypertension   • Hyperlipidemia   • Obesity (BMI 30-39.9)   • Routine health maintenance   • Elevated glucose   • White coat syndrome with diagnosis of hypertension   • Menopausal hot flushes       PAST MEDICAL HISTORY  Past Medical History:   Diagnosis Date   • Adiposity    • Allergic Sulfa   • Essential hypertension    • Hyperlipidemia    • Obesity (BMI 30-39.9)    • UTI (urinary tract infection)        SURGICAL HISTORY  Past Surgical History:   Procedure Laterality Date   • COLONOSCOPY  2017    Good   • TONSILLECTOMY  1972   • WISDOM TOOTH EXTRACTION  2000       SOCIAL HISTORY  Social History     Socioeconomic History   • Marital status:    • Number of children: 0   Tobacco Use   • Smoking status: Never Smoker   • Smokeless tobacco: Never Used   Vaping Use   • Vaping Use: Never used   Substance  and Sexual Activity   • Alcohol use: Yes     Alcohol/week: 2.0 - 4.0 standard drinks     Types: 2 - 4 Glasses of wine per week     Comment: Occasional social drinks   • Drug use: No   • Sexual activity: Yes     Partners: Male     Birth control/protection: Post-menopausal, None       FAMILY HISTORY  Family History   Problem Relation Age of Onset   • Coronary artery disease Other         MI   • Early death Sister    • ALS Sister    • Hypertension Mother    • Heart disease Mother    • Heart attack Mother    • Breast cancer Neg Hx    • Ovarian cancer Neg Hx    • Uterine cancer Neg Hx    • Colon cancer Neg Hx        IMMUNIZATION HISTORY  Immunization History   Administered Date(s) Administered   • COVID-19 (PFIZER) PURPLE CAP 04/12/2021, 05/03/2021   • DTaP 06/15/2011   • Shingrix 02/21/2022, 05/27/2022   • Td 04/01/2012, 04/20/2012   • Td, Not Adsorbed 05/27/2022       ______________________________________________________________________    REVIEW OF SYSTEMS     Review of Systems   Constitutional: Negative.    HENT: Negative.    Eyes: Negative.    Respiratory: Negative.    Cardiovascular: Negative.    Gastrointestinal: Negative.    Endocrine: Negative.    Genitourinary: Negative.    Musculoskeletal: Negative.    Skin: Negative.    Allergic/Immunologic: Negative.    Neurological: Negative.    Hematological: Negative.    Psychiatric/Behavioral: Negative.      PHYSICAL EXAMINATION     Physical Exam  Constitutional:       Appearance: Normal appearance. She is obese.   HENT:      Head: Normocephalic and atraumatic.      Right Ear: Tympanic membrane normal.      Left Ear: Tympanic membrane normal.      Nose: Nose normal. No congestion.      Mouth/Throat:      Mouth: Mucous membranes are moist.      Pharynx: Oropharynx is clear.   Eyes:      Conjunctiva/sclera: Conjunctivae normal.      Pupils: Pupils are equal, round, and reactive to light.   Neck:      Vascular: No carotid bruit.   Cardiovascular:      Rate and Rhythm:  Normal rate and regular rhythm.      Pulses: Normal pulses.      Heart sounds: Normal heart sounds.   Pulmonary:      Effort: Pulmonary effort is normal.      Breath sounds: Normal breath sounds.   Abdominal:      General: There is no distension.      Palpations: Abdomen is soft.      Tenderness: There is no abdominal tenderness. There is no right CVA tenderness, left CVA tenderness or guarding.   Genitourinary:     Comments: Followed by obgyn   Musculoskeletal:         General: No swelling or deformity. Normal range of motion.      Cervical back: Normal range of motion.      Right lower leg: No edema.      Left lower leg: No edema.   Lymphadenopathy:      Cervical: No cervical adenopathy.   Skin:     General: Skin is warm and dry.   Neurological:      General: No focal deficit present.      Mental Status: She is alert and oriented to person, place, and time. Mental status is at baseline.      Cranial Nerves: No cranial nerve deficit.      Motor: No weakness.      Gait: Gait normal.   Psychiatric:         Thought Content: Thought content normal.         Judgment: Judgment normal.        REVIEWED DATA      Labs:    Lab Results   Component Value Date     05/20/2022    K 4.9 05/20/2022    CALCIUM 9.1 05/20/2022    AST 21 05/20/2022    ALT 18 05/20/2022    BUN 14 05/20/2022    CREATININE 0.66 05/20/2022    CREATININE 0.74 05/04/2021    CREATININE 0.74 11/05/2020    EGFRIFNONA 82 05/04/2021    EGFRIFAFRI 99 05/04/2021       Lab Results   Component Value Date    HGBA1C 5.7 (H) 05/20/2022    HGBA1C 5.50 05/04/2021    HGBA1C 5.49 11/05/2020    TSH 2.120 05/20/2022    FREET4 1.06 05/20/2022       Lab Results   Component Value Date     (H) 05/20/2022    HDL 97 05/20/2022    TRIG 100 05/20/2022    CHOLHDLRATIO 2.5 05/20/2022       Lab Results   Component Value Date    RWWZ05QW 84.6 11/13/2017        Lab Results   Component Value Date    WBC 4.1 05/20/2022    HGB 14.5 05/20/2022    MCV 92 05/20/2022      05/20/2022       Lab Results   Component Value Date    PROTEIN Negative 05/20/2022    GLUCOSEU Negative 05/20/2022    BLOODU Negative 05/20/2022    NITRITEU Negative 05/20/2022    LEUKOCYTESUR Negative 05/20/2022          Lab Results   Component Value Date    HEPCVIRUSABY <0.1 05/04/2021       Imaging:        Medical Tests:        ASSESSMENT & PLAN     ANNUAL WELLNESS EXAM / PHYSICAL     Other medical problems addressed today:  Hypertension is moderately controlled at home with average of 130/80 with ramipril 10, amlodipine 5 and hydrochlorothiazide 12.5 mg daily.  Amlodipine at higher doses caused lower extremity edema.  Denies any chest pain, shortness of air, headache, visual disturbances, lower extremity leg swelling, or heart palpitations.      Hyperlipidemia LDL still 129 pravastatin 40 mg daily.  High HDL.  No myalgias with medication.  No elevation of liver enzymes.  BMI 31.88, patient has recently started a new diet and lost more than 10 pounds.    Followed by OB/GYN for routine mammogram Pap smear which she is up-to-date.    Colonoscopy is up-to-date    Summary/Discussion:     · Increase hydrochlorothiazide to 25 mg daily, continue amlodipine 5 and ramipril 10 for hypertension.  Continue pravastatin 40 for hyperlipidemia  · Continue management with OB/GYN, up-to-date on all health screenings.  · Administer tetanus and Shingrix second vaccine today  · Continue improving healthy diet and exercise  · Follow-up in 6 months for chronic medical, 1 year annual physical    Next Appointment with me: Visit date not found    Return in about 6 months (around 11/27/2022) for Next scheduled follow up; 1 year physical with me .      HEALTHCARE MAINTENANCE ISSUES     Cancer Screening:  · Colon: Initial/Next screening at age: CURRENT  · Repeat colon cancer screening: every 10 years  · Breast: Recommended monthly self exams; annual professional exam  · Mammogram: every 1 year  · Cervical: 3 years  · Skin: Monthly self skin  examination, annual exam by health professional  · Lung: Does not meet criteria for lung cancer screening.   · Other:    Screening Labs & Tests:  · Lab results reviewed & discussed with the patient or test orders placed today.  · EKG:  · CV Screening: Lipid panel  · DEXA (65+ or postmenopausal with risk factors):   · HEP C (If born [id number], or risk factors): Negative screen  · Other:     Immunization/Vaccinations (to be given today unless deferred by patient)  · Influenza:   · Hepatitis A:   · Tetanus/Pertussis:   · Pneumovax:   · Shingles:   · Other:     Lifestyle Counseling:  · Lifestyle Modifications:   · Safety Issues: Always wear seatbelt, Avoid texting while driving   · Use sunscreen, regular skin examination  · Recommended annual dental/vision examination.  · Emotional/Stress/Sleep: Reviewed and  given when appropriate      Health Maintenance   Topic Date Due   • COVID-19 Vaccine (3 - Booster for Pfizer series) 10/03/2021   • INFLUENZA VACCINE  08/01/2022   • LIPID PANEL  05/20/2023   • ANNUAL PHYSICAL  05/28/2023   • MAMMOGRAM  03/28/2024   • PAP SMEAR  03/28/2025   • COLORECTAL CANCER SCREENING  09/08/2027   • TDAP/TD VACCINES (4 - Tdap) 05/27/2032   • HEPATITIS C SCREENING  Completed   • ZOSTER VACCINE  Completed   • Pneumococcal Vaccine 0-64  Aged Out         Examiner was wearing KN95 mask, face shield and exam gloves during the entire duration of the visit. Patient was masked the entire time.   Minimum social distance of 6 ft maintained entire visit except if physical contact was necessary as documented.     **Dragon Disclaimer:   Much of this encounter note is an electronic transcription/translation of spoken language to printed text. The electronic translation of spoken language may permit erroneous, or at times, nonsensical words or phrases to be inadvertently transcribed. Although I have reviewed the note for such errors, some may still exist.

## 2022-06-06 DIAGNOSIS — I10 ESSENTIAL HYPERTENSION: ICD-10-CM

## 2022-06-06 RX ORDER — AMLODIPINE BESYLATE 10 MG/1
5 TABLET ORAL DAILY
Qty: 90 TABLET | Refills: 3 | Status: SHIPPED | OUTPATIENT
Start: 2022-06-06 | End: 2023-03-06 | Stop reason: SDUPTHER

## 2022-07-04 DIAGNOSIS — I10 ESSENTIAL HYPERTENSION: ICD-10-CM

## 2022-07-05 RX ORDER — RAMIPRIL 10 MG/1
CAPSULE ORAL
Qty: 180 CAPSULE | Refills: 1 | Status: SHIPPED | OUTPATIENT
Start: 2022-07-05 | End: 2022-12-27

## 2022-09-22 DIAGNOSIS — E78.5 HYPERLIPIDEMIA, UNSPECIFIED HYPERLIPIDEMIA TYPE: ICD-10-CM

## 2022-09-22 RX ORDER — PRAVASTATIN SODIUM 40 MG
40 TABLET ORAL DAILY
Qty: 90 TABLET | Refills: 3 | Status: SHIPPED | OUTPATIENT
Start: 2022-09-22 | End: 2023-03-06 | Stop reason: SDUPTHER

## 2022-11-10 RX ORDER — METAXALONE 800 MG/1
800 TABLET ORAL 3 TIMES DAILY PRN
Qty: 9 TABLET | Refills: 0 | Status: SHIPPED | OUTPATIENT
Start: 2022-11-10 | End: 2023-03-06

## 2022-12-26 DIAGNOSIS — I10 ESSENTIAL HYPERTENSION: ICD-10-CM

## 2022-12-27 RX ORDER — RAMIPRIL 10 MG/1
CAPSULE ORAL
Qty: 180 CAPSULE | Refills: 1 | Status: SHIPPED | OUTPATIENT
Start: 2022-12-27 | End: 2023-03-06 | Stop reason: SDUPTHER

## 2023-03-06 ENCOUNTER — OFFICE VISIT (OUTPATIENT)
Dept: INTERNAL MEDICINE | Age: 58
End: 2023-03-06
Payer: COMMERCIAL

## 2023-03-06 VITALS
HEIGHT: 65 IN | OXYGEN SATURATION: 99 % | TEMPERATURE: 97.1 F | DIASTOLIC BLOOD PRESSURE: 88 MMHG | WEIGHT: 204.8 LBS | HEART RATE: 70 BPM | SYSTOLIC BLOOD PRESSURE: 138 MMHG | BODY MASS INDEX: 34.12 KG/M2

## 2023-03-06 DIAGNOSIS — I10 WHITE COAT SYNDROME WITH DIAGNOSIS OF HYPERTENSION: Primary | ICD-10-CM

## 2023-03-06 DIAGNOSIS — E78.5 HYPERLIPIDEMIA, UNSPECIFIED HYPERLIPIDEMIA TYPE: ICD-10-CM

## 2023-03-06 DIAGNOSIS — R73.01 IMPAIRED FASTING BLOOD SUGAR: ICD-10-CM

## 2023-03-06 PROCEDURE — 99214 OFFICE O/P EST MOD 30 MIN: CPT | Performed by: NURSE PRACTITIONER

## 2023-03-06 RX ORDER — HYDROCHLOROTHIAZIDE 25 MG/1
25 TABLET ORAL DAILY
Qty: 90 TABLET | Refills: 3
Start: 2023-03-06

## 2023-03-06 RX ORDER — PRAVASTATIN SODIUM 40 MG
40 TABLET ORAL DAILY
Qty: 90 TABLET | Refills: 3
Start: 2023-03-06

## 2023-03-06 RX ORDER — RAMIPRIL 10 MG/1
20 CAPSULE ORAL DAILY
Qty: 180 CAPSULE | Refills: 1
Start: 2023-03-06

## 2023-03-06 RX ORDER — AMLODIPINE BESYLATE 10 MG/1
5 TABLET ORAL DAILY
Qty: 90 TABLET | Refills: 3
Start: 2023-03-06

## 2023-03-06 NOTE — PROGRESS NOTES
I N T E R N A L  M E D I C I N E  ALPHONSO RUIZ      ENCOUNTER DATE:  03/06/2023    Niesha Bui / 57 y.o. / female      CHIEF COMPLAINT / REASON FOR OFFICE VISIT     Hypertension and Hyperlipidemia      ASSESSMENT & PLAN     Problem List Items Addressed This Visit        Cardiac and Vasculature    Hyperlipidemia    Relevant Medications    pravastatin (PRAVACHOL) 40 MG tablet    Other Relevant Orders    Lipid Panel With / Chol / HDL Ratio    White coat syndrome with diagnosis of hypertension - Primary    Relevant Medications    amLODIPine (NORVASC) 10 MG tablet    hydroCHLOROthiazide (HYDRODIURIL) 25 MG tablet    ramipril (ALTACE) 10 MG capsule    Other Relevant Orders    Comprehensive Metabolic Panel       Endocrine and Metabolic    Impaired fasting blood sugar    Relevant Orders    Hemoglobin A1c     Orders Placed This Encounter   Procedures   • Lipid Panel With / Chol / HDL Ratio   • Comprehensive Metabolic Panel   • Hemoglobin A1c     New Medications Ordered This Visit   Medications   • amLODIPine (NORVASC) 10 MG tablet     Sig: Take 0.5 tablets by mouth Daily.     Dispense:  90 tablet     Refill:  3   • hydroCHLOROthiazide (HYDRODIURIL) 25 MG tablet     Sig: Take 1 tablet by mouth Daily.     Dispense:  90 tablet     Refill:  3   • ramipril (ALTACE) 10 MG capsule     Sig: Take 2 capsules by mouth Daily.     Dispense:  180 capsule     Refill:  1   • pravastatin (PRAVACHOL) 40 MG tablet     Sig: Take 1 tablet by mouth Daily.     Dispense:  90 tablet     Refill:  3       SUMMARY/DISCUSSION  • Follow-up as scheduled in August, earlier if needed depending on lab work-up today  • Decrease/eliminate soda, and overall caloric intake. Reduce carbohydrates and sweets in diet.  Continue to improve dietary habits with lean proteins, fresh vegetables, fruits, and nuts. Improve aerobic exercise: walking/biking/swimming daily as tolerated, recommend 30 minutes/day at least 5 days/week.    Next Appointment with me:  "8/14/2023    Return for Next scheduled follow up.      VITAL SIGNS     Visit Vitals  /88   Pulse 70   Temp 97.1 °F (36.2 °C) (Temporal)   Ht 165.1 cm (65\")   Wt 92.9 kg (204 lb 12.8 oz)   LMP 03/07/2018 (Exact Date)   SpO2 99%   BMI 34.08 kg/m²     Wt Readings from Last 3 Encounters:   03/06/23 92.9 kg (204 lb 12.8 oz)   05/27/22 86.9 kg (191 lb 9.6 oz)   03/28/22 85.7 kg (189 lb)     Body mass index is 34.08 kg/m².      MEDICATIONS AT THE TIME OF OFFICE VISIT     Current Outpatient Medications on File Prior to Visit   Medication Sig   • escitalopram (LEXAPRO) 10 MG tablet Take 1 tablet by mouth Daily.   • Multiple Minerals-Vitamins (CALCIUM CITRATE PLUS/MAGNESIUM) tablet Take  by mouth.   • Vitamin D, Cholecalciferol, 1000 units capsule Take  by mouth.   • [DISCONTINUED] amLODIPine (NORVASC) 10 MG tablet Take 0.5 tablets by mouth Daily.   • [DISCONTINUED] hydroCHLOROthiazide (HYDRODIURIL) 25 MG tablet Take 1 tablet by mouth Daily.   • [DISCONTINUED] pravastatin (PRAVACHOL) 40 MG tablet Take 1 tablet by mouth Daily.   • [DISCONTINUED] ramipril (ALTACE) 10 MG capsule TAKE 2 CAPSULES DAILY   • [DISCONTINUED] metaxalone (Skelaxin) 800 MG tablet Take 1 tablet by mouth 3 (Three) Times a Day As Needed for Muscle Spasms.     No current facility-administered medications on file prior to visit.          HISTORY OF PRESENT ILLNESS     Hypertension is controlled at home with average of 120-130/70-80 with ramipril 10, amlodipine 5 and hydrochlorothiazide 25 mg daily.  Amlodipine at higher doses caused lower extremity edema.  Denies any chest pain, shortness of air, headache, visual disturbances, lower extremity leg swelling, or heart palpitations.      Hyperlipidemia LDL still 129 pravastatin 40 mg daily.  High HDL.  No myalgias with medication.  No elevation of liver enzymes.    Has lost 7 pounds since last office visit.    REVIEW OF SYSTEMS     Constitutional neg except per HPI   Resp neg  CV neg    PHYSICAL EXAMINATION "     Physical Exam  Constitutional  No distress  Cardiovascular Rate  normal . Rhythm: regular . Heart sounds:  normal  Pulmonary/Chest  Effort normal. Breath sounds:  normal  Psychiatric  Alert. Judgment and thought content normal. Mood normal     REVIEWED DATA     Labs:   Lab Results   Component Value Date    GLUCOSE 85 05/20/2022    BUN 14 05/20/2022    CREATININE 0.66 05/20/2022    EGFRRESULT 104 05/20/2022    EGFR >60 05/16/2014    BCR 21 05/20/2022    K 4.9 05/20/2022    CO2 23 05/20/2022    CALCIUM 9.1 05/20/2022    PROTENTOTREF 7.4 05/20/2022    ALBUMIN 4.4 05/20/2022    LABIL2 1.5 05/20/2022    AST 21 05/20/2022    ALT 18 05/20/2022     Lab Results   Component Value Date    WBC 4.1 05/20/2022    HGB 14.5 05/20/2022    HCT 43.9 05/20/2022    MCV 92 05/20/2022     05/20/2022     Lab Results   Component Value Date    TSH 2.120 05/20/2022     Lab Results   Component Value Date    HGBA1C 5.7 (H) 05/20/2022     Lab Results   Component Value Date    CHLPL 243 (H) 05/20/2022    TRIG 100 05/20/2022    HDL 97 05/20/2022     (H) 05/20/2022     Brief Urine Lab Results  (Last result in the past 365 days)      Color   Clarity   Blood   Leuk Est   Nitrite   Protein   CREAT   Urine HCG        05/20/22 0916 Yellow   Clear   Negative   Negative   Negative   Negative               Imaging:           Medical Tests:           Summary of old records / correspondence / consultant report:           Request outside records:             *Examiner was wearing medical surgical mask.   **Dragon dictation used for documentation.

## 2023-03-07 LAB
ALBUMIN SERPL-MCNC: 4.7 G/DL (ref 3.5–5.2)
ALBUMIN/GLOB SERPL: 1.8 G/DL
ALP SERPL-CCNC: 95 U/L (ref 39–117)
ALT SERPL-CCNC: 22 U/L (ref 1–33)
AST SERPL-CCNC: 26 U/L (ref 1–32)
BILIRUB SERPL-MCNC: 0.4 MG/DL (ref 0–1.2)
BUN SERPL-MCNC: 18 MG/DL (ref 6–20)
BUN/CREAT SERPL: 30 (ref 7–25)
CALCIUM SERPL-MCNC: 9.5 MG/DL (ref 8.6–10.5)
CHLORIDE SERPL-SCNC: 99 MMOL/L (ref 98–107)
CHOLEST SERPL-MCNC: 229 MG/DL (ref 0–200)
CHOLEST/HDLC SERPL: 2.22 {RATIO}
CO2 SERPL-SCNC: 28 MMOL/L (ref 22–29)
CREAT SERPL-MCNC: 0.6 MG/DL (ref 0.57–1)
EGFRCR SERPLBLD CKD-EPI 2021: 105.5 ML/MIN/1.73
GLOBULIN SER CALC-MCNC: 2.6 GM/DL
GLUCOSE SERPL-MCNC: 94 MG/DL (ref 65–99)
HBA1C MFR BLD: 5.6 % (ref 4.8–5.6)
HDLC SERPL-MCNC: 103 MG/DL (ref 40–60)
LDLC SERPL CALC-MCNC: 112 MG/DL (ref 0–100)
POTASSIUM SERPL-SCNC: 4.3 MMOL/L (ref 3.5–5.2)
PROT SERPL-MCNC: 7.3 G/DL (ref 6–8.5)
SODIUM SERPL-SCNC: 136 MMOL/L (ref 136–145)
TRIGL SERPL-MCNC: 83 MG/DL (ref 0–150)
VLDLC SERPL CALC-MCNC: 14 MG/DL (ref 5–40)

## 2023-05-08 RX ORDER — ESCITALOPRAM OXALATE 10 MG/1
TABLET ORAL
Qty: 90 TABLET | Refills: 0 | Status: SHIPPED | OUTPATIENT
Start: 2023-05-08

## 2023-05-08 NOTE — TELEPHONE ENCOUNTER
Med refill. Scot pt. AE & Mx 3/28/23, Scheduled AE & Mx 7/19/23. [facility] pharmacy. Thank you.

## 2023-05-14 DIAGNOSIS — I10 WHITE COAT SYNDROME WITH DIAGNOSIS OF HYPERTENSION: ICD-10-CM

## 2023-05-15 RX ORDER — HYDROCHLOROTHIAZIDE 25 MG/1
TABLET ORAL
Qty: 90 TABLET | Refills: 3 | Status: SHIPPED | OUTPATIENT
Start: 2023-05-15

## 2023-05-23 ENCOUNTER — HOSPITAL ENCOUNTER (OUTPATIENT)
Dept: CARDIOLOGY | Facility: HOSPITAL | Age: 58
Discharge: HOME | End: 2023-05-23
Admitting: NURSE PRACTITIONER
Payer: COMMERCIAL

## 2023-05-23 ENCOUNTER — OFFICE VISIT (OUTPATIENT)
Dept: INTERNAL MEDICINE | Age: 58
End: 2023-05-23
Payer: COMMERCIAL

## 2023-05-23 VITALS
DIASTOLIC BLOOD PRESSURE: 102 MMHG | HEART RATE: 92 BPM | BODY MASS INDEX: 35.19 KG/M2 | TEMPERATURE: 98.7 F | SYSTOLIC BLOOD PRESSURE: 146 MMHG | OXYGEN SATURATION: 99 % | HEIGHT: 65 IN | WEIGHT: 211.2 LBS

## 2023-05-23 DIAGNOSIS — I10 WHITE COAT SYNDROME WITH DIAGNOSIS OF HYPERTENSION: ICD-10-CM

## 2023-05-23 DIAGNOSIS — M25.862 KNEE JOINT CYST, LEFT: Primary | ICD-10-CM

## 2023-05-23 LAB
BH CV LOW VAS LEFT SOLEAL VESSEL: 1
BH CV LOWER VASCULAR LEFT COMMON FEMORAL AUGMENT: NORMAL
BH CV LOWER VASCULAR LEFT COMMON FEMORAL COMPETENT: NORMAL
BH CV LOWER VASCULAR LEFT COMMON FEMORAL COMPRESS: NORMAL
BH CV LOWER VASCULAR LEFT COMMON FEMORAL PHASIC: NORMAL
BH CV LOWER VASCULAR LEFT COMMON FEMORAL SPONT: NORMAL
BH CV LOWER VASCULAR LEFT DISTAL FEMORAL COMPRESS: NORMAL
BH CV LOWER VASCULAR LEFT GASTRONEMIUS COMPRESS: NORMAL
BH CV LOWER VASCULAR LEFT GREATER SAPH AK COMPRESS: NORMAL
BH CV LOWER VASCULAR LEFT GREATER SAPH BK COMPRESS: NORMAL
BH CV LOWER VASCULAR LEFT LESSER SAPH COMPRESS: NORMAL
BH CV LOWER VASCULAR LEFT MID FEMORAL AUGMENT: NORMAL
BH CV LOWER VASCULAR LEFT MID FEMORAL COMPETENT: NORMAL
BH CV LOWER VASCULAR LEFT MID FEMORAL COMPRESS: NORMAL
BH CV LOWER VASCULAR LEFT MID FEMORAL PHASIC: NORMAL
BH CV LOWER VASCULAR LEFT MID FEMORAL SPONT: NORMAL
BH CV LOWER VASCULAR LEFT PERONEAL COMPRESS: NORMAL
BH CV LOWER VASCULAR LEFT POPLITEAL AUGMENT: NORMAL
BH CV LOWER VASCULAR LEFT POPLITEAL COMPETENT: NORMAL
BH CV LOWER VASCULAR LEFT POPLITEAL COMPRESS: NORMAL
BH CV LOWER VASCULAR LEFT POPLITEAL PHASIC: NORMAL
BH CV LOWER VASCULAR LEFT POPLITEAL SPONT: NORMAL
BH CV LOWER VASCULAR LEFT POSTERIOR TIBIAL COMPRESS: NORMAL
BH CV LOWER VASCULAR LEFT PROFUNDA FEMORAL COMPRESS: NORMAL
BH CV LOWER VASCULAR LEFT PROXIMAL FEMORAL COMPRESS: NORMAL
BH CV LOWER VASCULAR LEFT SAPHENOFEMORAL JUNCTION COMPRESS: NORMAL
BH CV LOWER VASCULAR LEFT SOLEAL COMPRESS: NORMAL
BH CV LOWER VASCULAR LEFT SOLEAL THROMBUS: NORMAL
BH CV LOWER VASCULAR RIGHT COMMON FEMORAL AUGMENT: NORMAL
BH CV LOWER VASCULAR RIGHT COMMON FEMORAL COMPETENT: NORMAL
BH CV LOWER VASCULAR RIGHT COMMON FEMORAL COMPRESS: NORMAL
BH CV LOWER VASCULAR RIGHT COMMON FEMORAL PHASIC: NORMAL
BH CV LOWER VASCULAR RIGHT COMMON FEMORAL SPONT: NORMAL
BH CV VAS PRELIMINARY FINDINGS SCRIPTING: 1
MAXIMAL PREDICTED HEART RATE: 164 BPM
STRESS TARGET HR: 139 BPM

## 2023-05-23 PROCEDURE — 93971 EXTREMITY STUDY: CPT

## 2023-05-23 RX ORDER — CEPHALEXIN 500 MG/1
500 CAPSULE ORAL 2 TIMES DAILY
Qty: 20 CAPSULE | Refills: 0 | Status: SHIPPED | OUTPATIENT
Start: 2023-05-23 | End: 2023-06-02

## 2023-05-23 NOTE — PROGRESS NOTES
"    I N T E R N A L  M E D I C I N E  ALPHONSO Delcid    ENCOUNTER DATE:  05/23/2023    Niesha Bui / 57 y.o. / female      CHIEF COMPLAINT / REASON FOR OFFICE VISIT     Joint Swelling (Knot on back of left knee,no injury)      ASSESSMENT & PLAN     Diagnoses and all orders for this visit:    1. Knee joint cyst, left (Primary)  Overview:  Discussion of possible Meniscal cyst vs DVT. Doppler and ultrasound ordered. Instructed ice as needed, tylenol as needed and await results   Rx Keflex for possible infection and referral to Ortho for drainage of cyst located lateral left behind knee with tenderness.     Orders:  -     US Nonvascular Extremity Limited  -     Duplex Venous Lower Extremity - Left CAR- negative for DVT today  -     Ambulatory Referral to Orthopedic Surgery    Other orders  -     cephalexin (Keflex) 500 MG capsule; Take 1 capsule by mouth 2 (Two) Times a Day for 10 days.  Dispense: 20 capsule; Refill: 0     2. White coat syndrome with diagnosis of hypertension  Patient with noted white coat syndrome and current on Ramipril 20 mg daily, Amlodipine 5 mg daily,  and Hydrochlorothiazide 25 mg daily    Instructed to monitor blood pressures at home daily and record for NP and follow low sodium heathy heart diet, exercise at least 30 minutes 3 days a week.     SUMMARY/DISCUSSION  • Followed by OB/GYN, Dr Ellison      Next Appointment with ALPHONSO Aldana     VITAL SIGNS     Visit Vitals  BP (!) 146/102 (BP Location: Right arm, Patient Position: Sitting, Cuff Size: Adult) Comment: pt has severe white coat syndrome   Pulse 92   Temp 98.7 °F (37.1 °C) (Temporal)   Ht 165.1 cm (65\")   Wt 95.8 kg (211 lb 3.2 oz)   LMP 03/07/2018 (Exact Date)   SpO2 99%   BMI 35.15 kg/m²           BP Readings from Last 3 Encounters:   05/23/23 (!) 146/102   03/06/23 138/88   05/27/22 134/80     Wt Readings from Last 3 Encounters:   05/23/23 95.8 kg (211 lb 3.2 oz)   03/06/23 92.9 kg (204 lb 12.8 oz)   05/27/22 86.9 kg (191 lb " "9.6 oz)     Body mass index is 35.15 kg/m².    Blood pressure readings recorded on patient flowsheet:       View : No data to display.                     MEDICATIONS AT THE TIME OF OFFICE VISIT     Current Outpatient Medications on File Prior to Visit   Medication Sig Dispense Refill   • amLODIPine (NORVASC) 10 MG tablet Take 0.5 tablets by mouth Daily. 90 tablet 3   • escitalopram (LEXAPRO) 10 MG tablet TAKE 1 TABLET BY MOUTH EVERY DAY 90 tablet 0   • hydroCHLOROthiazide (HYDRODIURIL) 25 MG tablet TAKE 1 TABLET BY MOUTH EVERY DAY 90 tablet 3   • Multiple Minerals-Vitamins (CALCIUM CITRATE PLUS/MAGNESIUM) tablet Take  by mouth.     • pravastatin (PRAVACHOL) 40 MG tablet Take 1 tablet by mouth Daily. 90 tablet 3   • ramipril (ALTACE) 10 MG capsule Take 2 capsules by mouth Daily. 180 capsule 1   • Vitamin D, Cholecalciferol, 1000 units capsule Take  by mouth.       No current facility-administered medications on file prior to visit.        HISTORY OF PRESENT ILLNESS   57 year old female being seen today for concern for palpable, painful cyst located on outer aspect of left knee. Patient states no known injury or trauma. Patient states has not taken any over the counter medication. Patient states does have a Hx of DVTs. Patient denies any pain in calf with ambulation and negative Homans sign.   Patient with documented \"white coat syndrome\". Patient states continues on blood pressure medications an states controlled at home. Patient denies any chest pain, palpations, or increased shortness of breath.        Patient Care Team:  Raul Farley APRN as PCP - General (Internal Medicine)  Kesha Ellison MD as Consulting Physician (Obstetrics and Gynecology)    REVIEW OF SYSTEMS     Review of Systems   Constitutional: Positive for activity change. Negative for appetite change, chills and fever.   Respiratory: Negative for cough, chest tightness and shortness of breath.    Cardiovascular: Negative for chest pain and " palpitations.   Gastrointestinal: Negative for abdominal distention, abdominal pain, constipation, diarrhea, nausea and vomiting.   Musculoskeletal: Positive for joint swelling.   Skin:        Cyst behind knee   Neurological: Negative for dizziness and headaches.          PHYSICAL EXAMINATION     Physical Exam  Constitutional:       General: She is not in acute distress.  Cardiovascular:      Rate and Rhythm: Normal rate and regular rhythm.      Pulses: Normal pulses.      Heart sounds: Normal heart sounds.   Pulmonary:      Effort: Pulmonary effort is normal. No respiratory distress.      Breath sounds: Normal breath sounds.   Abdominal:      General: Bowel sounds are normal. There is no distension.      Palpations: Abdomen is soft.      Tenderness: There is no abdominal tenderness.   Musculoskeletal:         General: Swelling and tenderness present. Normal range of motion.   Skin:     General: Skin is warm and dry.      Comments: Palpable meniscal area cyst with tenderness with palpation    Neurological:      General: No focal deficit present.      Mental Status: She is alert and oriented to person, place, and time. Mental status is at baseline.      Cranial Nerves: No cranial nerve deficit.   Psychiatric:         Mood and Affect: Mood normal.         Behavior: Behavior normal.         Thought Content: Thought content normal.         Judgment: Judgment normal.             REVIEWED DATA     Labs:     Lab Results   Component Value Date     03/06/2023    K 4.3 03/06/2023    CALCIUM 9.5 03/06/2023    AST 26 03/06/2023    ALT 22 03/06/2023    BUN 18 03/06/2023    CREATININE 0.60 03/06/2023    CREATININE 0.66 05/20/2022    CREATININE 0.74 05/04/2021    EGFRIFNONA 82 05/04/2021    EGFRIFAFRI 99 05/04/2021       Lab Results   Component Value Date    HGBA1C 5.60 03/06/2023    HGBA1C 5.7 (H) 05/20/2022    HGBA1C 5.50 05/04/2021       Lab Results   Component Value Date     (H) 03/06/2023     (H)  2022     (H) 2021     (H) 2023    HDL 97 2022    TRIG 83 2023    TRIG 100 2022       Lab Results   Component Value Date    TSH 2.120 2022    TSH 1.510 2021    TSH 1.860 2020    FREET4 1.06 2022    FREET4 1.09 2021    FREET4 CANCELED 2019       Lab Results   Component Value Date    WBC 4.1 2022    HGB 14.5 2022     2022       No results found for: MALBCRERATIO         Imagin23 Doppler Left Venous   •  Chronic left lower extremity deep vein thrombosis noted in the soleal.  •  All other left sided veins appeared normal.             Medical Tests:           Summary of old records / correspondence / consultant report:           Request outside records:           ALPHONSO Delcid

## 2023-05-23 NOTE — ASSESSMENT & PLAN NOTE
Patient with noted white coat syndrome and current on Ramipril 20 mg daily, Amlodipine 5 mg daily,  and Hydrochlorothiazide 25 mg daily    Instructed to monitor blood pressures at home daily and record for NP and follow low sodium heathy heart diet, exercise at least 30 minutes 3 days a week.

## 2023-06-01 ENCOUNTER — OFFICE VISIT (OUTPATIENT)
Dept: ORTHOPEDIC SURGERY | Facility: CLINIC | Age: 58
End: 2023-06-01

## 2023-06-01 VITALS — BODY MASS INDEX: 35.75 KG/M2 | WEIGHT: 214.6 LBS | TEMPERATURE: 97.8 F | HEIGHT: 65 IN

## 2023-06-01 DIAGNOSIS — M17.12 ARTHRITIS OF LEFT KNEE: ICD-10-CM

## 2023-06-01 DIAGNOSIS — M71.22 BAKER'S CYST, LEFT: ICD-10-CM

## 2023-06-01 DIAGNOSIS — R52 PAIN: Primary | ICD-10-CM

## 2023-06-01 NOTE — PROGRESS NOTES
Patient Name: Niesha Bui   YOB: 1965  Referring Primary Care Physician: Raul Farley APRN  BMI: Body mass index is 35.71 kg/m².    Chief Complaint:    Chief Complaint   Patient presents with   • Left Knee - Initial Evaluation, Pain        HPI:     Niesha Bui is a 57 y.o. female who presents today for evaluation of   Chief Complaint   Patient presents with   • Left Knee - Initial Evaluation, Pain   .  Patient is seen today in follow-up on her left knee.  She said about 3 to 4 weeks ago she awoke she noticed a knot in the back part of her knee.  She says she got on WebMD and she and her  were concerned so they saw their primary care he diagnosed a knee cyst.  She was sent for Doppler basically showed a chronic DVT but no mention of the cyst.  She says is doing a lot better and basically just wanted to get it checked and see if anything needed to be done      Subjective   Medications:   Home Medications:  Current Outpatient Medications on File Prior to Visit   Medication Sig   • amLODIPine (NORVASC) 10 MG tablet Take 0.5 tablets by mouth Daily.   • cephalexin (Keflex) 500 MG capsule Take 1 capsule by mouth 2 (Two) Times a Day for 10 days.   • escitalopram (LEXAPRO) 10 MG tablet TAKE 1 TABLET BY MOUTH EVERY DAY   • hydroCHLOROthiazide (HYDRODIURIL) 25 MG tablet TAKE 1 TABLET BY MOUTH EVERY DAY   • Multiple Minerals-Vitamins (CALCIUM CITRATE PLUS/MAGNESIUM) tablet Take  by mouth.   • pravastatin (PRAVACHOL) 40 MG tablet Take 1 tablet by mouth Daily.   • ramipril (ALTACE) 10 MG capsule Take 2 capsules by mouth Daily.   • Vitamin D, Cholecalciferol, 1000 units capsule Take  by mouth.     No current facility-administered medications on file prior to visit.     Current Medications:  Scheduled Meds:  Continuous Infusions:No current facility-administered medications for this visit.    PRN Meds:.    I have reviewed the patient's medical history in detail and updated the computerized patient  record.  Review and summarization of old records includes:    Past Medical History:   Diagnosis Date   • Adiposity    • Allergic Sulfa   • Ankle sprain 07122020   • Essential hypertension    • Hyperlipidemia    • Knee sprain 061984   • Obesity (BMI 30-39.9)    • Tear of meniscus of knee 061984   • UTI (urinary tract infection)         Past Surgical History:   Procedure Laterality Date   • ADENOIDECTOMY  1972   • COLONOSCOPY  2017    Good   • TONSILLECTOMY  1972   • WISDOM TOOTH EXTRACTION  2000        Social History     Occupational History   • 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[employer]   Tobacco Use   • Smoking status: Never   • Smokeless tobacco: Never   Vaping Use   • Vaping Use: Never used   Substance and Sexual Activity   • Alcohol use: Yes     Alcohol/week: 2.0 - 4.0 standard drinks     Types: 2 - 4 Glasses of wine per week     Comment: Occasional social drinks   • Drug use: No   • Sexual activity: Yes     Partners: Male     Birth control/protection: Post-menopausal, None      Social History     Social History Narrative     March 2016        Family History   Problem Relation Age of Onset   • Coronary artery disease Other         MI   • Early death Sister    • ALS Sister    • Scoliosis Sister    • Hypertension Mother    • Heart disease Mother    • Heart attack Mother    • Breast cancer Neg Hx    • Ovarian cancer Neg Hx    • Uterine cancer Neg Hx    • Colon cancer Neg Hx        ROS: 14 point review of systems was performed and all other systems were reviewed and are negative except for documented findings in HPI and today's encounter.     Allergies:   Allergies   Allergen Reactions   • Sulfa Antibiotics Hives   • Chlorthalidone Diarrhea     Constitutional:  Denies fever, shaking or chills   Eyes:  Denies change in visual acuity   HENT:  Denies nasal congestion or sore throat   Respiratory:  Denies cough or shortness of breath   Cardiovascular:  Denies chest pain or severe LE edema   GI:  Denies abdominal pain, nausea,  "vomiting, bloody stools or diarrhea   Musculoskeletal:  Numbness, tingling, pain, or loss of motor function only as noted above in history of present illness.  : Denies painful urination or hematuria  Integument:  Denies rash, lesion or ulceration   Neurologic:  Denies headache or focal weakness  Endocrine:  Denies lymphadenopathy  Psych:  Denies confusion or change in mental status   Hem:  Denies active bleeding    OBJECTIVE:  Physical Exam: 57 y.o. female  Wt Readings from Last 3 Encounters:   06/01/23 97.3 kg (214 lb 9.6 oz)   05/23/23 95.8 kg (211 lb 3.2 oz)   03/06/23 92.9 kg (204 lb 12.8 oz)     Ht Readings from Last 1 Encounters:   06/01/23 165.1 cm (65\")     Body mass index is 35.71 kg/m².  Vitals:    06/01/23 1021   Temp: 97.8 °F (36.6 °C)     Vital signs reviewed.     General Appearance:    Alert, cooperative, in no acute distress                  Eyes: conjunctiva clear  ENT: external ears and nose atraumatic  CV: no peripheral edema  Resp: normal respiratory effort  Skin: no rashes or wounds; normal turgor  Psych: mood and affect appropriate  Lymph: no nodes appreciated  Neuro: gross sensation intact  Vascular:  Palpable peripheral pulse in noted extremity  Musculoskeletal Extremities: Exam today shows crepitation and some synovitis and swelling bilateral knees she not tender however along the joint line Delmar's is nontender and is a little bit of fullness posteriorly in her left knee consistent with a Baker's cyst.  It does not appear ruptured and is nontender in this area    Radiology:   AP lateral 40 degree PA x-ray left knee taken the office today for complaints of pain without comparison views available show fairly advanced tricompartmental arthritis with most of oppressive changes in the patellofemoral joint.        Assessment:     ICD-10-CM ICD-9-CM   1. Pain  R52 780.96   2. Baker's cyst, left  M71.22 727.51   3. Arthritis of left knee  M17.12 716.96        MDM/Plan:   The diagnosis(es), " natural history, pathophysiology and treatment for diagnosis(es) were discussed. Opportunity given and questions answered.  Biomechanics of pertinent body areas discussed.  When appropriate, the use of ambulatory aids discussed.    The diagnosis(es), natural history, pathophysiology and treatment for diagnosis(es) were discussed. Opportunity given and questions answered.  Biomechanics of pertinent body areas discussed.  When appropriate, the use of ambulatory aids discussed.  MEDICATIONS:  The risks, benefits, warnings,side effects and alternatives of medications discussed.  Inflammation/pain control; with cold, heat, elevation and/or liniments discussed as appropriate  CONSULT: This Consult is done at the request of a requesting provider to whom I will send this report with this rendered opinion.  MEDICAL RECORDS reviewed from other provider(s) for past and current medical history pertinent to this complaint.  I explained to her what a Baker's cyst was in the thinks that she is on a having pain is not large at this time there is no treatment that needs to be done she has significant knee pain be happy to see her back    6/1/2023    Dictated utilizing Dragon dictation

## 2023-06-02 ENCOUNTER — PATIENT ROUNDING (BHMG ONLY) (OUTPATIENT)
Dept: ORTHOPEDIC SURGERY | Facility: CLINIC | Age: 58
End: 2023-06-02

## 2023-06-02 NOTE — PROGRESS NOTES
A [employer] Message has been sent to the patient for PATIENT ROUNDING with [facility] [place]

## 2023-07-23 DIAGNOSIS — I10 WHITE COAT SYNDROME WITH DIAGNOSIS OF HYPERTENSION: ICD-10-CM

## 2023-07-24 RX ORDER — RAMIPRIL 10 MG/1
CAPSULE ORAL
Qty: 60 CAPSULE | Refills: 2 | Status: SHIPPED | OUTPATIENT
Start: 2023-07-24

## 2023-08-04 RX ORDER — ESCITALOPRAM OXALATE 10 MG/1
TABLET ORAL
Qty: 90 TABLET | Refills: 0 | Status: SHIPPED | OUTPATIENT
Start: 2023-08-04

## 2023-09-07 DIAGNOSIS — E78.5 HYPERLIPIDEMIA, UNSPECIFIED HYPERLIPIDEMIA TYPE: ICD-10-CM

## 2023-09-08 RX ORDER — PRAVASTATIN SODIUM 40 MG
TABLET ORAL
Qty: 90 TABLET | Refills: 1 | Status: SHIPPED | OUTPATIENT
Start: 2023-09-08

## 2023-10-15 DIAGNOSIS — I10 WHITE COAT SYNDROME WITH DIAGNOSIS OF HYPERTENSION: ICD-10-CM

## 2023-10-16 RX ORDER — RAMIPRIL 10 MG/1
CAPSULE ORAL
Qty: 60 CAPSULE | Refills: 0 | Status: SHIPPED | OUTPATIENT
Start: 2023-10-16

## 2023-10-20 ENCOUNTER — PROCEDURE VISIT (OUTPATIENT)
Dept: OBSTETRICS AND GYNECOLOGY | Facility: CLINIC | Age: 58
End: 2023-10-20
Payer: COMMERCIAL

## 2023-10-20 ENCOUNTER — OFFICE VISIT (OUTPATIENT)
Dept: OBSTETRICS AND GYNECOLOGY | Facility: CLINIC | Age: 58
End: 2023-10-20
Payer: COMMERCIAL

## 2023-10-20 VITALS
WEIGHT: 209.6 LBS | HEIGHT: 65 IN | SYSTOLIC BLOOD PRESSURE: 156 MMHG | DIASTOLIC BLOOD PRESSURE: 91 MMHG | BODY MASS INDEX: 34.92 KG/M2

## 2023-10-20 DIAGNOSIS — Z01.419 WELL WOMAN EXAM WITH ROUTINE GYNECOLOGICAL EXAM: Primary | ICD-10-CM

## 2023-10-20 DIAGNOSIS — Z12.31 VISIT FOR SCREENING MAMMOGRAM: Primary | ICD-10-CM

## 2023-10-20 RX ORDER — ESCITALOPRAM OXALATE 10 MG/1
10 TABLET ORAL DAILY
Qty: 90 TABLET | Refills: 4 | Status: SHIPPED | OUTPATIENT
Start: 2023-10-20

## 2023-10-20 NOTE — PROGRESS NOTES
Subjective   Niesha Bui is a 58 y.o. female   Chief Complaint   Patient presents with    Annual Exam     Last ae pap: 3/28/22  Mammo today  Colonoscopy: 7 years ago       Niesha Bui is a 58 y.o.  who presents for an annual examination     Pap history:  Last pap:  - NIL HPV negative  16 NIL, HPV negative  Prior abnormal paps: yes, years and years ago  STDs  Sexually active: yes - newly   History of STDs: no  Contraception:  No menses since coming off oral contraceptive pills in 2018  Colonoscopy: 2017  Mammogram: 10/20/23   Menopause: around 51yo, after coming off oral contraceptive pill  VMS: yes, better on lexapro    History of physical abuse: no, History of sexual abuse: no and History of verbal/emotional abuse: no    Screening for BRCA-   Is patient's family history significant for any of the following?   no  For non-Ashkenazi Protestant women:   Two first-degree relatives with breast cancer, one of whom was diagnosed at age 50 or younger   A combination of three or more first or second-degree relatives with breast cancer regardless of age at diagnosis   A combination of both breast and ovarian cancer among first and second-degree relatives   A first-degree relative with bilateral breast cancer   A combination of two or more first or second degree relatives with ovarian cancer, regardless of age at diagnosis   A first or second-degree relative with both breast and ovarian cancer at any age   History of breast cancer in a male relative   For women of Ashkenazi Protestant descent:   Any first-degree relative (or two second degree relatives on the same side of the family) with breast or ovarian cancer   Recommendations from the [place] Preventive Services Task Force on who should be offered genetic testing for BRCA mutations*     Past Medical History:   Diagnosis Date    Adiposity     Allergic Sulfa    Ankle sprain [id number]    Essential hypertension     Hyperlipidemia     Knee sprain  061984    Obesity (BMI 30-39.9)     Tear of meniscus of knee 061984    UTI (urinary tract infection)      Past Surgical History:   Procedure Laterality Date    ADENOIDECTOMY  1972    COLONOSCOPY  2017    Good    TONSILLECTOMY  1972    WISDOM TOOTH EXTRACTION  2000     Social History     Tobacco Use    Smoking status: Never    Smokeless tobacco: Never   Vaping Use    Vaping Use: Never used   Substance Use Topics    Alcohol use: Yes     Alcohol/week: 2.0 - 4.0 standard drinks of alcohol     Types: 2 - 4 Glasses of wine per week     Comment: Occasional social drinks    Drug use: No     Family History   Problem Relation Age of Onset    Coronary artery disease Other         MI    Early death Sister     ALS Sister     Scoliosis Sister     Hypertension Mother     Heart disease Mother     Heart attack Mother     Breast cancer Neg Hx     Ovarian cancer Neg Hx     Uterine cancer Neg Hx     Colon cancer Neg Hx      Current Outpatient Medications on File Prior to Visit   Medication Sig Dispense Refill    amLODIPine (NORVASC) 10 MG tablet TAKE 1/2 TABLET BY MOUTH EVERY DAY 45 tablet 3    hydroCHLOROthiazide (HYDRODIURIL) 25 MG tablet TAKE 1 TABLET BY MOUTH EVERY DAY 90 tablet 3    Multiple Minerals-Vitamins (CALCIUM CITRATE PLUS/MAGNESIUM) tablet Take  by mouth.      pravastatin (PRAVACHOL) 40 MG tablet TAKE 1 TABLET BY MOUTH EVERY DAY 90 tablet 1    ramipril (ALTACE) 10 MG capsule TAKE 2 CAPSULES DAILY 60 capsule 0    Vitamin D, Cholecalciferol, 1000 units capsule Take  by mouth.      [DISCONTINUED] escitalopram (LEXAPRO) 10 MG tablet TAKE 1 TABLET BY MOUTH EVERY DAY 90 tablet 0     No current facility-administered medications on file prior to visit.     Allergies   Allergen Reactions    Sulfa Antibiotics Hives    Chlorthalidone Diarrhea        Review of Systems   Constitutional:  Negative for chills, fever and unexpected weight change.   HENT:  Negative for congestion, nosebleeds and sore throat.    Eyes:  Negative for visual  "disturbance.   Respiratory:  Negative for cough, chest tightness and shortness of breath.    Cardiovascular:  Negative for chest pain and palpitations.   Gastrointestinal:  Negative for abdominal pain, constipation, diarrhea, nausea and vomiting.   Endocrine: Negative for polyuria.   Genitourinary:  Negative for difficulty urinating, dyspareunia, dysuria, frequency, genital sores, hematuria, menstrual problem, pelvic pain, urgency, vaginal bleeding, vaginal discharge and vaginal pain.   Musculoskeletal:  Negative for arthralgias and joint swelling.   Skin:  Negative for color change and rash.   Neurological:  Negative for dizziness, light-headedness and headaches.   Hematological:  Does not bruise/bleed easily.   Psychiatric/Behavioral:  Negative for dysphoric mood. The patient is not nervous/anxious.        Objective    /91   Ht 165.1 cm (65\")   Wt 95.1 kg (209 lb 9.6 oz)   LMP 03/07/2018 (Exact Date)   BMI 34.88 kg/m²    Physical Exam   Constitutional: She is oriented to person, place, and time. She appears well-developed. No distress.   HENT:   Head: Normocephalic and atraumatic.   Neck: No tracheal deviation present. No thyromegaly present.   Cardiovascular: Normal rate, regular rhythm and normal heart sounds. Exam reveals no gallop and no friction rub.   No murmur heard.  Pulmonary/Chest: Effort normal and breath sounds normal. No respiratory distress. She has no wheezes. She has no rales. She exhibits no tenderness. Right breast exhibits no inverted nipple, no mass, no nipple discharge, no skin change and no tenderness. Left breast exhibits no inverted nipple, no mass, no nipple discharge, no skin change and no tenderness.   Abdominal: Soft. She exhibits no distension and no mass. There is no abdominal tenderness.   Genitourinary: There is no rash, lesion or injury on the right labia. There is no rash, lesion or injury on the left labia. Uterus is not deviated, not enlarged, not fixed and not tender. " Cervix exhibits no motion tenderness, no discharge and no friability. Right adnexum displays no mass, no tenderness and no fullness. Left adnexum displays no mass, no tenderness and no fullness.    No vaginal discharge, tenderness or bleeding.   No tenderness or bleeding in the vagina.   Musculoskeletal: Normal range of motion. No deformity.   Lymphadenopathy:     She has no cervical adenopathy.   Neurological: She is alert and oriented to person, place, and time.   Skin: Skin is warm and dry. No rash noted. She is not diaphoretic.   Psychiatric: Her behavior is normal. Judgment and thought content normal.         Assessment & Plan   Diagnoses and all orders for this visit:    1. Well woman exam with routine gynecological exam (Primary)    Other orders  -     escitalopram (LEXAPRO) 10 MG tablet; Take 1 tablet by mouth Daily.  Dispense: 90 tablet; Refill: 4          Well woman counseling/screening:    Cervical cancer screening:    Reports remote h/o cervical dysplasia  The patient is due for a pap in 2025.    Screening guidelines discussed with patient  Breast cancer screening:    Clinical breast exam recommended for age 20-39 years every 1-3 years   Mammogram recommended starting age 40, done today   Breast self awareness encouraged  STD Screening   Declined  VMS of menopause:   Would like to continue Lexapro, Rx sent.  She feels that mood is great and hot flashes well controlled.   Family history    does not demonstrate need for genetics referral   Healthy lifestyle counseling:   Body mass index is 34.88 kg/m². Lost 12lbs since last annual  Colonoscopy   2017, due in 2027    Hypertension:  Patient has had elevations - follows up with PCP. On medications, BP at home is improved.

## 2023-11-14 DIAGNOSIS — I10 WHITE COAT SYNDROME WITH DIAGNOSIS OF HYPERTENSION: ICD-10-CM

## 2023-11-14 RX ORDER — RAMIPRIL 10 MG/1
CAPSULE ORAL
Qty: 60 CAPSULE | Refills: 2 | Status: SHIPPED | OUTPATIENT
Start: 2023-11-14

## 2024-02-12 DIAGNOSIS — I10 WHITE COAT SYNDROME WITH DIAGNOSIS OF HYPERTENSION: ICD-10-CM

## 2024-02-12 RX ORDER — RAMIPRIL 10 MG/1
CAPSULE ORAL
Qty: 180 CAPSULE | Refills: 1 | Status: SHIPPED | OUTPATIENT
Start: 2024-02-12

## 2024-03-01 DIAGNOSIS — I10 WHITE COAT SYNDROME WITH DIAGNOSIS OF HYPERTENSION: ICD-10-CM

## 2024-03-01 RX ORDER — HYDROCHLOROTHIAZIDE 25 MG/1
TABLET ORAL
Qty: 90 TABLET | Refills: 3 | Status: SHIPPED | OUTPATIENT
Start: 2024-03-01

## 2024-03-11 DIAGNOSIS — E78.5 HYPERLIPIDEMIA, UNSPECIFIED HYPERLIPIDEMIA TYPE: ICD-10-CM

## 2024-03-11 RX ORDER — PRAVASTATIN SODIUM 40 MG
TABLET ORAL
Qty: 90 TABLET | Refills: 1 | Status: SHIPPED | OUTPATIENT
Start: 2024-03-11

## 2024-06-03 ENCOUNTER — OFFICE VISIT (OUTPATIENT)
Dept: INTERNAL MEDICINE | Age: 59
End: 2024-06-03
Payer: COMMERCIAL

## 2024-06-03 VITALS
WEIGHT: 215.4 LBS | HEART RATE: 64 BPM | TEMPERATURE: 97.9 F | BODY MASS INDEX: 35.89 KG/M2 | DIASTOLIC BLOOD PRESSURE: 79 MMHG | HEIGHT: 65 IN | OXYGEN SATURATION: 98 % | SYSTOLIC BLOOD PRESSURE: 136 MMHG

## 2024-06-03 DIAGNOSIS — E78.5 HYPERLIPIDEMIA, UNSPECIFIED HYPERLIPIDEMIA TYPE: ICD-10-CM

## 2024-06-03 DIAGNOSIS — Z00.00 ENCOUNTER FOR ANNUAL PHYSICAL EXAM: Primary | ICD-10-CM

## 2024-06-03 DIAGNOSIS — I10 ESSENTIAL HYPERTENSION: ICD-10-CM

## 2024-06-03 DIAGNOSIS — E66.01 CLASS 2 SEVERE OBESITY WITH SERIOUS COMORBIDITY AND BODY MASS INDEX (BMI) OF 35.0 TO 35.9 IN ADULT, UNSPECIFIED OBESITY TYPE: ICD-10-CM

## 2024-06-03 PROCEDURE — 99396 PREV VISIT EST AGE 40-64: CPT | Performed by: NURSE PRACTITIONER

## 2024-06-03 PROCEDURE — 99214 OFFICE O/P EST MOD 30 MIN: CPT | Performed by: NURSE PRACTITIONER

## 2024-06-03 RX ORDER — SEMAGLUTIDE 0.25 MG/.5ML
0.25 INJECTION, SOLUTION SUBCUTANEOUS WEEKLY
Qty: 2 ML | Refills: 0 | Status: SHIPPED | OUTPATIENT
Start: 2024-06-03

## 2024-06-03 NOTE — PROGRESS NOTES
"[facility] [place] INTERNAL MEDICINE  ALPHONSO Aldana      Niesha SLATER Ramya / 58 y.o. / female  06/03/2024      VITALS     Visit Vitals  /79   Pulse 64   Temp 97.9 °F (36.6 °C) (Temporal)   Ht 165.1 cm (65\")   Wt 97.7 kg (215 lb 6.4 oz)   LMP 03/07/2018 (Exact Date)   SpO2 98%   BMI 35.84 kg/m²       BP Readings from Last 3 Encounters:   06/03/24 136/79   10/20/23 156/91   05/23/23 (!) 146/102     Wt Readings from Last 3 Encounters:   06/03/24 97.7 kg (215 lb 6.4 oz)   10/20/23 95.1 kg (209 lb 9.6 oz)   06/01/23 97.3 kg (214 lb 9.6 oz)      Body mass index is 35.84 kg/m².    MEDICATIONS     Current Outpatient Medications   Medication Sig Dispense Refill    amLODIPine (NORVASC) 10 MG tablet TAKE 1/2 TABLET BY MOUTH EVERY DAY 45 tablet 3    escitalopram (LEXAPRO) 10 MG tablet Take 1 tablet by mouth Daily. 90 tablet 4    hydroCHLOROthiazide 25 MG tablet TAKE 1 TABLET BY MOUTH EVERY DAY 90 tablet 3    Multiple Minerals-Vitamins (CALCIUM CITRATE PLUS/MAGNESIUM) tablet Take  by mouth.      pravastatin (PRAVACHOL) 40 MG tablet TAKE 1 TABLET BY MOUTH EVERY DAY 90 tablet 1    ramipril (ALTACE) 10 MG capsule TAKE 2 CAPSULES DAILY 180 capsule 1    Vitamin D, Cholecalciferol, 1000 units capsule Take  by mouth.      Semaglutide-Weight Management (Wegovy) 0.25 MG/0.5ML solution auto-injector Inject 0.5 mL under the skin into the appropriate area as directed 1 (One) Time Per Week. 2 mL 0     No current facility-administered medications for this visit.       _____________________________________________________________________________________    CHIEF COMPLAINT     Annual Exam, Hypertension, Hyperlipidemia, and Obesity      HISTORY OF PRESENT ILLNESS     Niesha presents for annual health maintenance visit.    Last health maintenance visit: approximately 1 year ago  General health: good  Lifestyle:  Attempting to lose weight?: Yes   Diet: eats moderately healthy  Exercise: exercises 3-5 days weekly  Tobacco: Never used   Alcohol: " occasional/infrequent  Work: Full-time  Reproductive health:  Sexually active?: No   Sexual problems?: No problems  Concern for STD?: No    Sees Gynecologist?: Yes   Cesia/Postmenopausal?: Yes   Domestic abuse concerns: No   Depression Screenin/3/2024     1:20 PM   PHQ-2/PHQ-9 Depression Screening   Little Interest or Pleasure in Doing Things 0-->not at all   Feeling Down, Depressed or Hopeless 0-->not at all   PHQ-9: Brief Depression Severity Measure Score 0         PHQ-2: 0 (Not depressed)   PHQ-9: 0 (Negative screening for depression)    Patient Care Team:  Raul Farley, GOLDIE, APRN as PCP - General (Internal Medicine)  Kesha Ellison MD as Consulting Physician (Obstetrics and Gynecology)  ______________________________________________________________________    ALLERGIES  Allergies   Allergen Reactions    Sulfa Antibiotics Hives    Chlorthalidone Diarrhea        PFSH:     The following portions of the patient's history were reviewed and updated as appropriate: Allergies / Current Medications / Past Medical History / Surgical History / Social History / Family History    PROBLEM LIST   Patient Active Problem List   Diagnosis    Essential hypertension    Hyperlipidemia    Obesity (BMI 30-39.9)    Routine health maintenance    Elevated glucose    White coat syndrome with diagnosis of hypertension    Menopausal hot flushes    Impaired fasting blood sugar    Knee joint cyst, left       PAST MEDICAL HISTORY  Past Medical History:   Diagnosis Date    Adiposity     Allergic Sulfa    Ankle sprain     Essential hypertension     Hyperlipidemia     Knee sprain 619    Obesity (BMI 30-39.9)     Tear of meniscus of knee 619    UTI (urinary tract infection)        SURGICAL HISTORY  Past Surgical History:   Procedure Laterality Date    ADENOIDECTOMY  1972    COLONOSCOPY  2017    Good    TONSILLECTOMY  1972    WISDOM TOOTH EXTRACTION         SOCIAL HISTORY  Social History     Socioeconomic History     Marital status:     Number of children: 0   Tobacco Use    Smoking status: Never    Smokeless tobacco: Never   Vaping Use    Vaping status: Never Used   Substance and Sexual Activity    Alcohol use: Yes     Alcohol/week: 2.0 - 4.0 standard drinks of alcohol     Types: 2 - 4 Glasses of wine per week     Comment: Occasional social drinks    Drug use: No    Sexual activity: Not Currently     Partners: Male     Birth control/protection: Post-menopausal, None       FAMILY HISTORY  Family History   Problem Relation Age of Onset    Coronary artery disease Other         MI    Early death Sister     ALS Sister     Scoliosis Sister     Hypertension Mother     Heart disease Mother     Heart attack Mother     Breast cancer Neg Hx     Ovarian cancer Neg Hx     Uterine cancer Neg Hx     Colon cancer Neg Hx        IMMUNIZATION HISTORY  Immunization History   Administered Date(s) Administered    COVID-19 (PFIZER) Purple Cap Monovalent 04/12/2021, 05/03/2021    DTaP 06/15/2011    Shingrix 02/21/2022, 05/27/2022    Td (TDVAX) 04/01/2012, 04/20/2012    Td, Not Adsorbed 05/27/2022       ______________________________________________________________________    REVIEW OF SYSTEMS     Review of Systems  Constitutional: Negative.    HENT: Negative.    Eyes: Negative.    Respiratory: Negative.    Cardiovascular: Negative.    Gastrointestinal: Negative.    Endocrine: Negative.    Genitourinary: Negative.    Musculoskeletal: Negative.    Skin: Negative.    Allergic/Immunologic: Negative.    Neurological: Negative.    Hematological: Negative.    Psychiatric/Behavioral: Negative.      PHYSICAL EXAMINATION     Physical Exam  Constitutional:       Appearance: Normal appearance. She is obese.   HENT:      Head: Normocephalic and atraumatic.      Right Ear: Tympanic membrane normal.      Left Ear: Tympanic membrane normal.      Nose: Nose normal. No congestion.      Mouth/Throat:      Mouth: Mucous membranes are moist.      Pharynx:  Oropharynx is clear.   Eyes:      Conjunctiva/sclera: Conjunctivae normal.      Pupils: Pupils are equal, round, and reactive to light.   Neck:      Vascular: No carotid bruit.   Cardiovascular:      Rate and Rhythm: Normal rate and regular rhythm.      Pulses: Normal pulses.      Heart sounds: Normal heart sounds.   Pulmonary:      Effort: Pulmonary effort is normal.      Breath sounds: Normal breath sounds.   Abdominal:      General: There is no distension.      Palpations: Abdomen is soft.      Tenderness: There is no abdominal tenderness. There is no right CVA tenderness, left CVA tenderness or guarding.   Genitourinary:     Comments: Followed by obgyn   Musculoskeletal:         General: No swelling or deformity. Normal range of motion.      Cervical back: Normal range of motion.      Right lower leg: No edema.      Left lower leg: No edema.   Lymphadenopathy:      Cervical: No cervical adenopathy.   Skin:     General: Skin is warm and dry.   Neurological:      General: No focal deficit present.      Mental Status: She is alert and oriented to person, place, and time. Mental status is at baseline.      Cranial Nerves: No cranial nerve deficit.      Motor: No weakness.      Gait: Gait normal.   Psychiatric:         Thought Content: Thought content normal.         Judgment: Judgment normal.     REVIEWED DATA      Labs:    Lab Results   Component Value Date     03/06/2023    K 4.3 03/06/2023    CALCIUM 9.5 03/06/2023    AST 26 03/06/2023    ALT 22 03/06/2023    BUN 18 03/06/2023    CREATININE 0.60 03/06/2023    CREATININE 0.66 05/20/2022    CREATININE 0.74 05/04/2021    EGFRIFNONA 82 05/04/2021    EGFRIFAFRI 99 05/04/2021       Lab Results   Component Value Date    HGBA1C 5.60 03/06/2023    HGBA1C 5.7 (H) 05/20/2022    HGBA1C 5.50 05/04/2021    TSH 2.120 05/20/2022    FREET4 1.06 05/20/2022       Lab Results   Component Value Date     (H) 03/06/2023     (H) 03/06/2023    TRIG 83 03/06/2023     CHOLHDLRATIO 2.22 03/06/2023       Lab Results   Component Value Date    KNLF49FE 84.6 11/13/2017        Lab Results   Component Value Date    WBC 4.1 05/20/2022    HGB 14.5 05/20/2022    MCV 92 05/20/2022     05/20/2022       Lab Results   Component Value Date    PROTEIN Negative 05/20/2022    GLUCOSEU Negative 05/20/2022    BLOODU Negative 05/20/2022    NITRITEU Negative 05/20/2022    LEUKOCYTESUR Negative 05/20/2022          Lab Results   Component Value Date    HEPCVIRUSABY <0.1 05/04/2021       Imaging:        Medical Tests:        ASSESSMENT & PLAN     ANNUAL WELLNESS EXAM / PHYSICAL     Other medical problems addressed today:  Colon cancer screening due in 2027, completed in 2017 with recall of 10 years.  Due for Pap smear in 2025, followed by OB/GYN, Lexapro for mild anxiety and hot flashes controlling well.  Mammogram up-to-date, completing yearly.    Blood pressure is high range in the 130s over 70s with heart rate in the 70 to 80s.  She has however gained some weight since 6 months prior.  Has been performing exercises at least 3-5 times a week.  At least moderately if not well-balanced diet.     Summary/Discussion:     Blood pressure would likely decrease with weight loss, continue current medication regimen for hypertension but go ahead and start Wegovy.  She works at [employer] and believes this is covered.  Has no history of pancreatitis or medullary thyroid cancer.  Discussed that we will send Wegovy 0.25 mg for weekly dosing.  Discussed this may take 1 to 2 weeks for prior authorization.  If she tolerates it well after 2-3 doses she is to let us know on MyChart and we will increase the dose to the next dose of 0.5 mg.    Next Appointment with me: Visit date not found    Return in about 4 months (around 10/3/2024) for Next scheduled follow up; 1 year annual physical .      HEALTHCARE MAINTENANCE ISSUES     Cancer Screening:  Colon: Initial/Next screening at age: CURRENT  Repeat colon cancer  screening: every 10 years  Breast: Recommended monthly self exams; annual professional exam  Mammogram: every 1 year  Cervical: pelvic exam as recommended by gyne  Skin: Monthly self skin examination, annual exam by health professional  Lung: Does not meet criteria for lung cancer screening.   Other:    Screening Labs & Tests:  Lab results reviewed & discussed with the patient or test orders placed today.  EKG:  CV Screening: Lipid panel  DEXA (65+ or postmenopausal with risk factors):   HEP C (If born [id number], or risk factors): Negative screen  Other:     Immunization/Vaccinations (to be given today unless deferred by patient)  Influenza: Patient deferred/declined flu vaccine (recommended annual vaccination)  Hepatitis A: Recommended here or at pharmacy  Tetanus/Pertussis: Up to date  Pneumovax: Not needed at this time  Shingles: Up to date  Other:     Lifestyle Counseling:  Lifestyle Modifications: Improve dietary compliance and Increase intensity/regularity of aerobic exercise  Safety Issues: Always wear seatbelt, Avoid texting while driving   Use sunscreen, regular skin examination  Recommended annual dental/vision examination.  Emotional/Stress/Sleep: Reviewed and  given when appropriate      Health Maintenance   Topic Date Due    COVID-19 Vaccine (3 - 2023-24 season) 09/01/2023    LIPID PANEL  03/06/2024    INFLUENZA VACCINE  08/01/2024    PAP SMEAR  03/28/2025    ANNUAL PHYSICAL  06/03/2025    BMI FOLLOWUP  06/03/2025    MAMMOGRAM  10/20/2025    COLORECTAL CANCER SCREENING  09/08/2027    TDAP/TD VACCINES (4 - Tdap) 05/27/2032    HEPATITIS C SCREENING  Completed    ZOSTER VACCINE  Completed    Pneumococcal Vaccine 0-64  Aged Out     *Dragon dictation used for documentation.

## 2024-06-06 LAB
ALBUMIN SERPL-MCNC: 4.3 G/DL (ref 3.5–5.2)
ALBUMIN/GLOB SERPL: 1.5 G/DL
ALP SERPL-CCNC: 82 U/L (ref 39–117)
ALT SERPL-CCNC: 24 U/L (ref 1–33)
APPEARANCE UR: CLEAR
AST SERPL-CCNC: 23 U/L (ref 1–32)
BACTERIA #/AREA URNS HPF: NORMAL /[HPF]
BACTERIA UR CULT: ABNORMAL
BASOPHILS # BLD AUTO: 0.04 10*3/MM3 (ref 0–0.2)
BASOPHILS NFR BLD AUTO: 0.9 % (ref 0–1.5)
BILIRUB SERPL-MCNC: 0.5 MG/DL (ref 0–1.2)
BILIRUB UR QL STRIP: NEGATIVE
BUN SERPL-MCNC: 9 MG/DL (ref 6–20)
BUN/CREAT SERPL: 16.4 (ref 7–25)
CALCIUM SERPL-MCNC: 9.5 MG/DL (ref 8.6–10.5)
CASTS URNS QL MICRO: NORMAL /LPF
CHLORIDE SERPL-SCNC: 97 MMOL/L (ref 98–107)
CHOLEST SERPL-MCNC: 218 MG/DL (ref 0–200)
CHOLEST/HDLC SERPL: 2.25 {RATIO}
CO2 SERPL-SCNC: 26.1 MMOL/L (ref 22–29)
COLOR UR: YELLOW
CREAT SERPL-MCNC: 0.55 MG/DL (ref 0.57–1)
EGFRCR SERPLBLD CKD-EPI 2021: 107.1 ML/MIN/1.73
EOSINOPHIL # BLD AUTO: 0.07 10*3/MM3 (ref 0–0.4)
EOSINOPHIL NFR BLD AUTO: 1.6 % (ref 0.3–6.2)
EPI CELLS #/AREA URNS HPF: NORMAL /HPF (ref 0–10)
ERYTHROCYTE [DISTWIDTH] IN BLOOD BY AUTOMATED COUNT: 13.3 % (ref 12.3–15.4)
GLOBULIN SER CALC-MCNC: 2.8 GM/DL
GLUCOSE SERPL-MCNC: 98 MG/DL (ref 65–99)
GLUCOSE UR QL STRIP: NEGATIVE
HBA1C MFR BLD: 5.7 % (ref 4.8–5.6)
HCT VFR BLD AUTO: 42.7 % (ref 34–46.6)
HDLC SERPL-MCNC: 97 MG/DL (ref 40–60)
HGB BLD-MCNC: 14.4 G/DL (ref 12–15.9)
HGB UR QL STRIP: NEGATIVE
IMM GRANULOCYTES # BLD AUTO: 0.01 10*3/MM3 (ref 0–0.05)
IMM GRANULOCYTES NFR BLD AUTO: 0.2 % (ref 0–0.5)
KETONES UR QL STRIP: NEGATIVE
LDLC SERPL CALC-MCNC: 108 MG/DL (ref 0–100)
LEUKOCYTE ESTERASE UR QL STRIP: ABNORMAL
LYMPHOCYTES # BLD AUTO: 1.5 10*3/MM3 (ref 0.7–3.1)
LYMPHOCYTES NFR BLD AUTO: 33.7 % (ref 19.6–45.3)
MCH RBC QN AUTO: 31.6 PG (ref 26.6–33)
MCHC RBC AUTO-ENTMCNC: 33.7 G/DL (ref 31.5–35.7)
MCV RBC AUTO: 93.6 FL (ref 79–97)
MICRO URNS: ABNORMAL
MONOCYTES # BLD AUTO: 0.38 10*3/MM3 (ref 0.1–0.9)
MONOCYTES NFR BLD AUTO: 8.5 % (ref 5–12)
NEUTROPHILS # BLD AUTO: 2.45 10*3/MM3 (ref 1.7–7)
NEUTROPHILS NFR BLD AUTO: 55.1 % (ref 42.7–76)
NITRITE UR QL STRIP: NEGATIVE
NRBC BLD AUTO-RTO: 0 /100 WBC (ref 0–0.2)
OTHER ANTIBIOTIC SUSC ISLT: ABNORMAL
PH UR STRIP: 7 [PH] (ref 5–7.5)
PLATELET # BLD AUTO: 277 10*3/MM3 (ref 140–450)
POTASSIUM SERPL-SCNC: 4.1 MMOL/L (ref 3.5–5.2)
PROT SERPL-MCNC: 7.1 G/DL (ref 6–8.5)
PROT UR QL STRIP: NEGATIVE
RBC # BLD AUTO: 4.56 10*6/MM3 (ref 3.77–5.28)
RBC #/AREA URNS HPF: NORMAL /HPF (ref 0–2)
SODIUM SERPL-SCNC: 136 MMOL/L (ref 136–145)
SP GR UR STRIP: 1.01 (ref 1–1.03)
T4 FREE SERPL-MCNC: 1.2 NG/DL (ref 0.92–1.68)
TRIGL SERPL-MCNC: 74 MG/DL (ref 0–150)
TSH SERPL DL<=0.005 MIU/L-ACNC: 1.7 UIU/ML (ref 0.27–4.2)
URINALYSIS REFLEX: ABNORMAL
UROBILINOGEN UR STRIP-MCNC: 0.2 MG/DL (ref 0.2–1)
VLDLC SERPL CALC-MCNC: 13 MG/DL (ref 5–40)
WBC # BLD AUTO: 4.45 10*3/MM3 (ref 3.4–10.8)
WBC #/AREA URNS HPF: NORMAL /HPF (ref 0–5)

## 2024-06-07 DIAGNOSIS — I10 WHITE COAT SYNDROME WITH DIAGNOSIS OF HYPERTENSION: ICD-10-CM

## 2024-06-07 RX ORDER — AMLODIPINE BESYLATE 10 MG/1
5 TABLET ORAL DAILY
Qty: 45 TABLET | Refills: 1 | Status: SHIPPED | OUTPATIENT
Start: 2024-06-07

## 2024-07-17 DIAGNOSIS — R11.0 NAUSEA: Primary | ICD-10-CM

## 2024-07-17 RX ORDER — ONDANSETRON 4 MG/1
4 TABLET, FILM COATED ORAL EVERY 8 HOURS PRN
Qty: 30 TABLET | Refills: 0 | Status: SHIPPED | OUTPATIENT
Start: 2024-07-17

## 2024-08-16 DIAGNOSIS — I10 WHITE COAT SYNDROME WITH DIAGNOSIS OF HYPERTENSION: ICD-10-CM

## 2024-08-19 RX ORDER — RAMIPRIL 10 MG/1
CAPSULE ORAL
Qty: 180 CAPSULE | Refills: 0 | Status: SHIPPED | OUTPATIENT
Start: 2024-08-19

## 2024-09-03 DIAGNOSIS — E78.5 HYPERLIPIDEMIA, UNSPECIFIED HYPERLIPIDEMIA TYPE: ICD-10-CM

## 2024-09-03 RX ORDER — PRAVASTATIN SODIUM 40 MG
TABLET ORAL
Qty: 90 TABLET | Refills: 1 | Status: SHIPPED | OUTPATIENT
Start: 2024-09-03

## 2024-10-03 ENCOUNTER — OFFICE VISIT (OUTPATIENT)
Dept: INTERNAL MEDICINE | Age: 59
End: 2024-10-03
Payer: COMMERCIAL

## 2024-10-03 VITALS
WEIGHT: 201.6 LBS | OXYGEN SATURATION: 98 % | BODY MASS INDEX: 33.59 KG/M2 | HEIGHT: 65 IN | HEART RATE: 81 BPM | SYSTOLIC BLOOD PRESSURE: 130 MMHG | DIASTOLIC BLOOD PRESSURE: 74 MMHG | TEMPERATURE: 97.8 F

## 2024-10-03 DIAGNOSIS — R73.09 ELEVATED GLUCOSE: ICD-10-CM

## 2024-10-03 DIAGNOSIS — E78.5 HYPERLIPIDEMIA, UNSPECIFIED HYPERLIPIDEMIA TYPE: Primary | ICD-10-CM

## 2024-10-03 DIAGNOSIS — I10 WHITE COAT SYNDROME WITH DIAGNOSIS OF HYPERTENSION: ICD-10-CM

## 2024-10-03 DIAGNOSIS — E66.9 OBESITY WITH SERIOUS COMORBIDITY, UNSPECIFIED CLASS, UNSPECIFIED OBESITY TYPE: ICD-10-CM

## 2024-10-03 LAB
ALBUMIN SERPL-MCNC: 4.6 G/DL (ref 3.5–5.2)
ALBUMIN/GLOB SERPL: 1.7 G/DL
ALP SERPL-CCNC: 91 U/L (ref 39–117)
ALT SERPL-CCNC: 22 U/L (ref 1–33)
AST SERPL-CCNC: 24 U/L (ref 1–32)
BILIRUB SERPL-MCNC: 0.4 MG/DL (ref 0–1.2)
BUN SERPL-MCNC: 12 MG/DL (ref 6–20)
BUN/CREAT SERPL: 21.1 (ref 7–25)
CALCIUM SERPL-MCNC: 9.8 MG/DL (ref 8.6–10.5)
CHLORIDE SERPL-SCNC: 99 MMOL/L (ref 98–107)
CHOLEST SERPL-MCNC: 224 MG/DL (ref 0–200)
CHOLEST/HDLC SERPL: 2.43 {RATIO}
CO2 SERPL-SCNC: 27.7 MMOL/L (ref 22–29)
CREAT SERPL-MCNC: 0.57 MG/DL (ref 0.57–1)
EGFRCR SERPLBLD CKD-EPI 2021: 105.5 ML/MIN/1.73
GLOBULIN SER CALC-MCNC: 2.7 GM/DL
GLUCOSE SERPL-MCNC: 88 MG/DL (ref 65–99)
HBA1C MFR BLD: 5.6 % (ref 4.8–5.6)
HDLC SERPL-MCNC: 92 MG/DL (ref 40–60)
LDLC SERPL CALC-MCNC: 122 MG/DL (ref 0–100)
POTASSIUM SERPL-SCNC: 4.4 MMOL/L (ref 3.5–5.2)
PROT SERPL-MCNC: 7.3 G/DL (ref 6–8.5)
SODIUM SERPL-SCNC: 137 MMOL/L (ref 136–145)
TRIGL SERPL-MCNC: 56 MG/DL (ref 0–150)
VLDLC SERPL CALC-MCNC: 10 MG/DL (ref 5–40)

## 2024-10-03 PROCEDURE — 99214 OFFICE O/P EST MOD 30 MIN: CPT | Performed by: NURSE PRACTITIONER

## 2024-10-03 NOTE — PROGRESS NOTES
"    I N T E R N A L  M E D I C I N E  SETH DEE, APRN      ENCOUNTER DATE:  10/03/2024    Niesha Bui / 59 y.o. / female      CHIEF COMPLAINT / REASON FOR OFFICE VISIT     Obesity, Hypertension, and Hyperlipidemia      ASSESSMENT & PLAN     Problem List Items Addressed This Visit       Hyperlipidemia - Primary    Relevant Medications    pravastatin (PRAVACHOL) 40 MG tablet    Other Relevant Orders    Comprehensive Metabolic Panel    Lipid Panel With / Chol / HDL Ratio    Obesity with serious comorbidity    Elevated glucose    Overview     November 14, 2017, 114 mg percent         Relevant Orders    Hemoglobin A1c    White coat syndrome with diagnosis of hypertension    Relevant Medications    hydroCHLOROthiazide 25 MG tablet    amLODIPine (NORVASC) 10 MG tablet    ramipril (ALTACE) 10 MG capsule    Other Relevant Orders    Comprehensive Metabolic Panel     Orders Placed This Encounter   Procedures    Comprehensive Metabolic Panel    Lipid Panel With / Chol / HDL Ratio    Hemoglobin A1c     No orders of the defined types were placed in this encounter.      SUMMARY/DISCUSSION  Prior authorization was denied as she did not go through a 6-week weight loss program through [facility].  I provided her resources.  Discussed that if she pursues this I can prescribe her Wegovy for cheaper than weight loss clinic if she would like.  Continue current medication regimens for now.  Will monitor laboratory evaluations make sure it is safe to continue semaglutide through weight loss clinic.      Next Appointment with me: Visit date not found    Return in about 4 months (around 2/3/2025) for Next scheduled follow up.      VITAL SIGNS     Visit Vitals  /74   Pulse 81   Temp 97.8 °F (36.6 °C) (Temporal)   Ht 165.1 cm (65\")   Wt 91.4 kg (201 lb 9.6 oz)   LMP 03/07/2018 (Exact Date)   SpO2 98%   BMI 33.55 kg/m²     Wt Readings from Last 3 Encounters:   10/03/24 91.4 kg (201 lb 9.6 oz)   06/03/24 97.7 kg (215 lb 6.4 oz)   10/20/23 " 95.1 kg (209 lb 9.6 oz)     Body mass index is 33.55 kg/m².    MEDICATIONS AT THE TIME OF OFFICE VISIT     Current Outpatient Medications on File Prior to Visit   Medication Sig    amLODIPine (NORVASC) 10 MG tablet TAKE 1/2 TABLET BY MOUTH DAILY    escitalopram (LEXAPRO) 10 MG tablet Take 1 tablet by mouth Daily.    hydroCHLOROthiazide 25 MG tablet TAKE 1 TABLET BY MOUTH EVERY DAY    Multiple Minerals-Vitamins (CALCIUM CITRATE PLUS/MAGNESIUM) tablet Take  by mouth.    ondansetron (Zofran) 4 MG tablet Take 1 tablet by mouth Every 8 (Eight) Hours As Needed for Nausea or Vomiting.    pravastatin (PRAVACHOL) 40 MG tablet TAKE 1 TABLET BY MOUTH EVERY DAY    ramipril (ALTACE) 10 MG capsule TAKE 2 CAPSULES DAILY    Vitamin D, Cholecalciferol, 1000 units capsule Take  by mouth.    [DISCONTINUED] Semaglutide-Weight Management (Wegovy) 0.25 MG/0.5ML solution auto-injector Inject 0.5 mL under the skin into the appropriate area as directed 1 (One) Time Per Week.     No current facility-administered medications on file prior to visit.      HISTORY OF PRESENT ILLNESS     Colon cancer screening due in 2027, completed in 2017 with recall of 10 years.  Due for Pap smear in 2025, followed by OB/GYN, Lexapro for mild anxiety and hot flashes controlling well.  Mammogram up-to-date, completing yearly.     Hydrochlorothiazide 25 mg and amlodipine 10 mg and ramipril 10 moderately controlling blood pressure.  Would like to continue to lose weight further than increase medication at this time.  Has lost an additional 14 pounds since last office visit on our scales with compounded Wegovy through weight loss clinic.     REVIEW OF SYSTEMS     Constitutional neg except per HPI   Resp neg  CV neg     PHYSICAL EXAMINATION     Physical Exam  Constitutional  No distress  Cardiovascular Rate  normal . Rhythm: regular . Heart sounds:  normal  Pulmonary/Chest  Effort normal. Breath sounds:  normal  Psychiatric  Alert. Judgment and thought content  normal. Mood normal      REVIEWED DATA     Labs:   Lab Results   Component Value Date    GLUCOSE 98 06/03/2024    BUN 9 06/03/2024    CREATININE 0.55 (L) 06/03/2024    EGFRRESULT 107.1 06/03/2024    EGFR >60 05/16/2014    BCR 16.4 06/03/2024    K 4.1 06/03/2024    CO2 26.1 06/03/2024    CALCIUM 9.5 06/03/2024    PROTENTOTREF 7.1 06/03/2024    ALBUMIN 4.3 06/03/2024    BILITOT 0.5 06/03/2024    AST 23 06/03/2024    ALT 24 06/03/2024     Lab Results   Component Value Date    WBC 4.45 06/03/2024    HGB 14.4 06/03/2024    HCT 42.7 06/03/2024    MCV 93.6 06/03/2024     06/03/2024     Lab Results   Component Value Date    CHLPL 218 (H) 06/03/2024    TRIG 74 06/03/2024    HDL 97 (H) 06/03/2024     (H) 06/03/2024      Lab Results   Component Value Date    TSH 1.700 06/03/2024     Brief Urine Lab Results  (Last result in the past 365 days)        Color   Clarity   Blood   Leuk Est   Nitrite   Protein   CREAT   Urine HCG        06/03/24 1406 Yellow   Clear   Negative   Trace   Negative   Negative                 Lab Results   Component Value Date    HGBA1C 5.70 (H) 06/03/2024       Imaging:           Medical Tests:           Summary of old records / correspondence / consultant report:           Request outside records:

## 2024-10-25 ENCOUNTER — OFFICE VISIT (OUTPATIENT)
Dept: OBSTETRICS AND GYNECOLOGY | Facility: CLINIC | Age: 59
End: 2024-10-25
Payer: COMMERCIAL

## 2024-10-25 ENCOUNTER — PROCEDURE VISIT (OUTPATIENT)
Dept: OBSTETRICS AND GYNECOLOGY | Facility: CLINIC | Age: 59
End: 2024-10-25
Payer: COMMERCIAL

## 2024-10-25 VITALS
SYSTOLIC BLOOD PRESSURE: 134 MMHG | BODY MASS INDEX: 32.82 KG/M2 | DIASTOLIC BLOOD PRESSURE: 91 MMHG | HEART RATE: 94 BPM | HEIGHT: 65 IN | WEIGHT: 197 LBS

## 2024-10-25 DIAGNOSIS — Z12.31 VISIT FOR SCREENING MAMMOGRAM: Primary | ICD-10-CM

## 2024-10-25 DIAGNOSIS — Z01.419 WELL WOMAN EXAM WITH ROUTINE GYNECOLOGICAL EXAM: Primary | ICD-10-CM

## 2024-10-25 RX ORDER — ESCITALOPRAM OXALATE 10 MG/1
10 TABLET ORAL DAILY
Qty: 90 TABLET | Refills: 4 | Status: SHIPPED | OUTPATIENT
Start: 2024-10-25

## 2024-10-25 NOTE — PROGRESS NOTES
Subjective   Niesha Bui is a 59 y.o. female   Chief Complaint   Patient presents with    Well woman exam with routine gynecological exam       Niesha Bui is a 59 y.o.  who presents for an annual examination     Pap history:  Last pap: 2022 - NIL HPV negative  16 NIL, HPV negative  Prior abnormal paps: yes, years and years ago  STDs  Sexually active: yes - newly   History of STDs: no  Contraception:  No menses since coming off oral contraceptive pills in   Colonoscopy: 2017  Mammogram: 10/25/24   Menopause: around 51yo, after coming off oral contraceptive pill  No vaginal bleeding  VMS: no, better on lexapro  Incontinence: no  Dyspareunia: no    Screening for BRCA-   Is patient's family history significant for any of the following?   no  For non-Ashkenazi Gnosticist women:   Two first-degree relatives with breast cancer, one of whom was diagnosed at age 50 or younger   A combination of three or more first or second-degree relatives with breast cancer regardless of age at diagnosis   A combination of both breast and ovarian cancer among first and second-degree relatives   A first-degree relative with bilateral breast cancer   A combination of two or more first or second degree relatives with ovarian cancer, regardless of age at diagnosis   A first or second-degree relative with both breast and ovarian cancer at any age   History of breast cancer in a male relative   For women of Ashkenazi Gnosticist descent:   Any first-degree relative (or two second degree relatives on the same side of the family) with breast or ovarian cancer   Recommendations from the [place] Preventive Services Task Force on who should be offered genetic testing for BRCA mutations*     Past Medical History:   Diagnosis Date    Adiposity     Allergic Sulfa    Ankle sprain     Essential hypertension     Hyperlipidemia     Knee sprain 619    Obesity (BMI 30-39.9)     Tear of meniscus of knee 619     "UTI (urinary tract infection)      Past Surgical History:   Procedure Laterality Date    ADENOIDECTOMY  1972    COLONOSCOPY  2017    Good    TONSILLECTOMY  1972    WISDOM TOOTH EXTRACTION  2000     Social History     Tobacco Use    Smoking status: Never    Smokeless tobacco: Never   Vaping Use    Vaping status: Never Used   Substance Use Topics    Alcohol use: Yes     Alcohol/week: 2.0 - 4.0 standard drinks of alcohol     Types: 2 - 4 Glasses of wine per week     Comment: Occasional social drinks    Drug use: No     Family History   Problem Relation Age of Onset    Coronary artery disease Other         MI    Early death Sister     ALS Sister     Scoliosis Sister     Hypertension Mother     Heart disease Mother     Heart attack Mother     Breast cancer Neg Hx     Ovarian cancer Neg Hx     Uterine cancer Neg Hx     Colon cancer Neg Hx      Current Outpatient Medications on File Prior to Visit   Medication Sig Dispense Refill    amLODIPine (NORVASC) 10 MG tablet TAKE 1/2 TABLET BY MOUTH DAILY 45 tablet 1    hydroCHLOROthiazide 25 MG tablet TAKE 1 TABLET BY MOUTH EVERY DAY 90 tablet 3    Multiple Minerals-Vitamins (CALCIUM CITRATE PLUS/MAGNESIUM) tablet Take  by mouth.      pravastatin (PRAVACHOL) 40 MG tablet TAKE 1 TABLET BY MOUTH EVERY DAY 90 tablet 1    ramipril (ALTACE) 10 MG capsule TAKE 2 CAPSULES DAILY 180 capsule 0    Vitamin D, Cholecalciferol, 1000 units capsule Take  by mouth.      [DISCONTINUED] escitalopram (LEXAPRO) 10 MG tablet Take 1 tablet by mouth Daily. 90 tablet 4    [DISCONTINUED] ondansetron (Zofran) 4 MG tablet Take 1 tablet by mouth Every 8 (Eight) Hours As Needed for Nausea or Vomiting. 30 tablet 0     No current facility-administered medications on file prior to visit.     Allergies   Allergen Reactions    Sulfa Antibiotics Hives    Chlorthalidone Diarrhea        Review of Systems  See HPI  Objective    /91   Pulse 94   Ht 165.1 cm (65\")   Wt 89.4 kg (197 lb)   LMP 03/07/2018 (Exact " Date)   BMI 32.78 kg/m²    Physical Exam   Constitutional: She is oriented to person, place, and time. She appears well-developed. No distress.   HENT:   Head: Normocephalic and atraumatic.   Neck: No tracheal deviation present. No thyromegaly present.   Cardiovascular: Normal rate, regular rhythm and normal heart sounds. Exam reveals no gallop and no friction rub.   No murmur heard.  Pulmonary/Chest: Effort normal and breath sounds normal. No respiratory distress. She has no wheezes. She has no rales. She exhibits no tenderness. Right breast exhibits no inverted nipple, no mass, no nipple discharge, no skin change and no tenderness. Left breast exhibits no inverted nipple, no mass, no nipple discharge, no skin change and no tenderness.   Abdominal: Soft. She exhibits no distension and no mass. There is no abdominal tenderness.   Genitourinary: There is no rash, lesion or injury on the right labia. There is no rash, lesion or injury on the left labia. Uterus is not deviated, not enlarged, not fixed and not tender. Cervix exhibits no motion tenderness, no discharge and no friability. Right adnexum displays no mass, no tenderness and no fullness. Left adnexum displays no mass, no tenderness and no fullness.    No vaginal discharge, tenderness or bleeding.   No tenderness or bleeding in the vagina.   Musculoskeletal: Normal range of motion. No deformity.   Lymphadenopathy:     She has no cervical adenopathy.   Neurological: She is alert and oriented to person, place, and time.   Skin: Skin is warm and dry. No rash noted. She is not diaphoretic.   Psychiatric: Her behavior is normal. Judgment and thought content normal.         Assessment & Plan   Diagnoses and all orders for this visit:    1. Well woman exam with routine gynecological exam (Primary)  -     IGP, Apt HPV,rfx 16 / 18,45    Other orders  -     escitalopram (LEXAPRO) 10 MG tablet; Take 1 tablet by mouth Daily.  Dispense: 90 tablet; Refill:  4            Well woman counseling/screening:    Cervical cancer screening:    Reports remote h/o cervical dysplasia  We will do pap smear today    Screening guidelines discussed with patient  Breast cancer screening:    Clinical breast exam recommended for age 20-39 years every 1-3 years   Mammogram recommended starting age 40, done today   Breast self awareness encouraged  STD Screening   Declined  VMS of menopause:   Would like to continue Lexapro, Rx sent.  She feels that mood is great and hot flashes well controlled.   Family history    does not demonstrate need for genetics referral   Healthy lifestyle counseling:   Body mass index is 32.78 kg/m². Lost 12lbs since last annual  Colonoscopy   2017, due in 2027    Hypertension:  On 3 medications, follows with PCP. Asymptomatic

## 2024-11-01 LAB
CYTOLOGIST CVX/VAG CYTO: NORMAL
CYTOLOGY CVX/VAG DOC CYTO: NORMAL
CYTOLOGY CVX/VAG DOC THIN PREP: NORMAL
DX ICD CODE: NORMAL
HPV I/H RISK 4 DNA CVX QL PROBE+SIG AMP: NEGATIVE
Lab: NORMAL
OTHER STN SPEC: NORMAL
STAT OF ADQ CVX/VAG CYTO-IMP: NORMAL

## 2024-11-11 DIAGNOSIS — I10 WHITE COAT SYNDROME WITH DIAGNOSIS OF HYPERTENSION: ICD-10-CM

## 2024-11-11 RX ORDER — RAMIPRIL 10 MG/1
20 CAPSULE ORAL DAILY
Qty: 180 CAPSULE | Refills: 1 | Status: SHIPPED | OUTPATIENT
Start: 2024-11-11

## 2024-11-29 DIAGNOSIS — I10 WHITE COAT SYNDROME WITH DIAGNOSIS OF HYPERTENSION: ICD-10-CM

## 2024-12-01 RX ORDER — AMLODIPINE BESYLATE 10 MG/1
5 TABLET ORAL DAILY
Qty: 45 TABLET | Refills: 1 | Status: SHIPPED | OUTPATIENT
Start: 2024-12-01

## 2024-12-01 RX ORDER — HYDROCHLOROTHIAZIDE 25 MG/1
TABLET ORAL
Qty: 90 TABLET | Refills: 3 | Status: SHIPPED | OUTPATIENT
Start: 2024-12-01

## 2025-02-25 DIAGNOSIS — E78.5 HYPERLIPIDEMIA, UNSPECIFIED HYPERLIPIDEMIA TYPE: ICD-10-CM

## 2025-02-25 RX ORDER — PRAVASTATIN SODIUM 40 MG
TABLET ORAL
Qty: 90 TABLET | Refills: 1 | Status: SHIPPED | OUTPATIENT
Start: 2025-02-25

## 2025-03-28 ENCOUNTER — OFFICE VISIT (OUTPATIENT)
Dept: INTERNAL MEDICINE | Age: 60
End: 2025-03-28
Payer: COMMERCIAL

## 2025-03-28 VITALS
BODY MASS INDEX: 32.59 KG/M2 | DIASTOLIC BLOOD PRESSURE: 84 MMHG | TEMPERATURE: 98.1 F | HEIGHT: 65 IN | WEIGHT: 195.6 LBS | HEART RATE: 78 BPM | OXYGEN SATURATION: 97 % | SYSTOLIC BLOOD PRESSURE: 118 MMHG

## 2025-03-28 DIAGNOSIS — I10 PRIMARY HYPERTENSION: Primary | ICD-10-CM

## 2025-03-28 DIAGNOSIS — E78.5 HYPERLIPIDEMIA, UNSPECIFIED HYPERLIPIDEMIA TYPE: ICD-10-CM

## 2025-03-28 LAB
ALBUMIN SERPL-MCNC: 4.3 G/DL (ref 3.5–5.2)
ALBUMIN/GLOB SERPL: 1.5 G/DL
ALP SERPL-CCNC: 80 U/L (ref 39–117)
ALT SERPL-CCNC: 19 U/L (ref 1–33)
AST SERPL-CCNC: 23 U/L (ref 1–32)
BILIRUB SERPL-MCNC: 0.5 MG/DL (ref 0–1.2)
BUN SERPL-MCNC: 18 MG/DL (ref 6–20)
BUN/CREAT SERPL: 24.7 (ref 7–25)
CALCIUM SERPL-MCNC: 9.4 MG/DL (ref 8.6–10.5)
CHLORIDE SERPL-SCNC: 100 MMOL/L (ref 98–107)
CHOLEST SERPL-MCNC: 228 MG/DL (ref 0–200)
CHOLEST/HDLC SERPL: 2.51 {RATIO}
CO2 SERPL-SCNC: 26.6 MMOL/L (ref 22–29)
CREAT SERPL-MCNC: 0.73 MG/DL (ref 0.57–1)
EGFRCR SERPLBLD CKD-EPI 2021: 95.5 ML/MIN/1.73
GLOBULIN SER CALC-MCNC: 2.8 GM/DL
GLUCOSE SERPL-MCNC: 85 MG/DL (ref 65–99)
HDLC SERPL-MCNC: 91 MG/DL (ref 40–60)
LDLC SERPL CALC-MCNC: 126 MG/DL (ref 0–100)
POTASSIUM SERPL-SCNC: 4.5 MMOL/L (ref 3.5–5.2)
PROT SERPL-MCNC: 7.1 G/DL (ref 6–8.5)
SODIUM SERPL-SCNC: 139 MMOL/L (ref 136–145)
TRIGL SERPL-MCNC: 63 MG/DL (ref 0–150)
VLDLC SERPL CALC-MCNC: 11 MG/DL (ref 5–40)

## 2025-03-28 PROCEDURE — 99214 OFFICE O/P EST MOD 30 MIN: CPT | Performed by: NURSE PRACTITIONER

## 2025-03-28 NOTE — PROGRESS NOTES
"    I N T E R N A L  M E D I C I N E  SETH DEE, APRN      ENCOUNTER DATE:  03/28/2025    Niesha Bui / 59 y.o. / female      CHIEF COMPLAINT / REASON FOR OFFICE VISIT     Hyperlipidemia and Hypertension      ASSESSMENT & PLAN     Problem List Items Addressed This Visit       Hyperlipidemia    Relevant Medications    pravastatin (PRAVACHOL) 40 MG tablet    Other Relevant Orders    Lipid Panel With / Chol / HDL Ratio     Other Visit Diagnoses         Primary hypertension    -  Primary    Relevant Orders    Comprehensive Metabolic Panel          Orders Placed This Encounter   Procedures    Comprehensive Metabolic Panel    Lipid Panel With / Chol / HDL Ratio     No orders of the defined types were placed in this encounter.      SUMMARY/DISCUSSION  Continue current medication regimen for hypertension and hyperlipidemia.  If she is unable to get, discussed that we could consider out-of-pocket costs around $450.  She will consider in the future if needed.      Next Appointment with me: 6/5/2025    Return in about 6 months (around 9/28/2025) for Annual physical cancel june .      VITAL SIGNS     Visit Vitals  /84 (BP Location: Left arm, Patient Position: Sitting, Cuff Size: Adult)   Pulse 78   Temp 98.1 °F (36.7 °C) (Oral)   Ht 165.1 cm (65\")   Wt 88.7 kg (195 lb 9.6 oz)   LMP 03/07/2018 (Exact Date)   SpO2 97%   BMI 32.55 kg/m²       Wt Readings from Last 3 Encounters:   03/28/25 88.7 kg (195 lb 9.6 oz)   10/25/24 89.4 kg (197 lb)   10/03/24 91.4 kg (201 lb 9.6 oz)     Body mass index is 32.55 kg/m².      MEDICATIONS AT THE TIME OF OFFICE VISIT     Current Outpatient Medications on File Prior to Visit   Medication Sig    amLODIPine (NORVASC) 10 MG tablet TAKE 1/2 TABLET BY MOUTH DAILY    escitalopram (LEXAPRO) 10 MG tablet Take 1 tablet by mouth Daily.    hydroCHLOROthiazide 25 MG tablet TAKE 1 TABLET BY MOUTH EVERY DAY    Multiple Minerals-Vitamins (CALCIUM CITRATE PLUS/MAGNESIUM) tablet Take  by mouth.    " pravastatin (PRAVACHOL) 40 MG tablet TAKE 1 TABLET BY MOUTH EVERY DAY    ramipril (ALTACE) 10 MG capsule Take 2 capsules by mouth Daily.    Vitamin D, Cholecalciferol, 1000 units capsule Take  by mouth.     No current facility-administered medications on file prior to visit.          HISTORY OF PRESENT ILLNESS     Colon cancer screening due in 2027, completed in 2017 with recall of 10 years.  Due for Pap smear in 2025, followed by OB/GYN, Lexapro for mild anxiety and hot flashes controlling well.  Mammogram up-to-date, completing yearly.     Hydrochlorothiazide 25 mg and amlodipine 10 mg and ramipril 10 now well controlling blood pressure.  Has lost approximately 5 pounds weight loss since last office visit on her scale.  She is on Wegovy through weight loss clinic.  Blood pressure is now much better controlled.      REVIEW OF SYSTEMS     Constitutional neg except per HPI   Resp neg  CV neg     PHYSICAL EXAMINATION     Physical Exam  Constitutional  No distress  Cardiovascular Rate  normal . Rhythm: regular . Heart sounds:  normal  Pulmonary/Chest  Effort normal. Breath sounds:  normal  Psychiatric  Alert. Judgment and thought content normal. Mood normal      REVIEWED DATA     Labs:   Lab Results   Component Value Date    GLUCOSE 88 10/03/2024    BUN 12 10/03/2024    CREATININE 0.57 10/03/2024    EGFR 105.5 10/03/2024    BCR 21.1 10/03/2024    K 4.4 10/03/2024    CO2 27.7 10/03/2024    CALCIUM 9.8 10/03/2024    ALBUMIN 4.6 10/03/2024    BILITOT 0.4 10/03/2024    AST 24 10/03/2024    ALT 22 10/03/2024     Lab Results   Component Value Date    WBC 4.45 06/03/2024    HGB 14.4 06/03/2024    HCT 42.7 06/03/2024    MCV 93.6 06/03/2024     06/03/2024     Lab Results   Component Value Date    CHLPL 224 (H) 10/03/2024    TRIG 56 10/03/2024    HDL 92 (H) 10/03/2024     (H) 10/03/2024      Lab Results   Component Value Date    TSH 1.700 06/03/2024     Brief Urine Lab Results  (Last result in the past 365 days)         Color   Clarity   Blood   Leuk Est   Nitrite   Protein   CREAT   Urine HCG        06/03/24 1406 Yellow   Clear   Negative   Trace   Negative   Negative                 Lab Results   Component Value Date    HGBA1C 5.60 10/03/2024       Imaging:           Medical Tests:           Summary of old records / correspondence / consultant report:           Request outside records:

## 2025-03-30 DIAGNOSIS — E78.5 HYPERLIPIDEMIA, UNSPECIFIED HYPERLIPIDEMIA TYPE: Primary | ICD-10-CM

## 2025-03-30 RX ORDER — PRAVASTATIN SODIUM 80 MG/1
80 TABLET ORAL DAILY
Qty: 90 TABLET | Refills: 1 | Status: SHIPPED | OUTPATIENT
Start: 2025-03-30

## 2025-05-20 DIAGNOSIS — I10 WHITE COAT SYNDROME WITH DIAGNOSIS OF HYPERTENSION: ICD-10-CM

## 2025-05-20 RX ORDER — RAMIPRIL 10 MG/1
20 CAPSULE ORAL DAILY
Qty: 180 CAPSULE | Refills: 1 | Status: SHIPPED | OUTPATIENT
Start: 2025-05-20

## 2025-05-22 DIAGNOSIS — I10 WHITE COAT SYNDROME WITH DIAGNOSIS OF HYPERTENSION: ICD-10-CM

## 2025-05-22 RX ORDER — AMLODIPINE BESYLATE 10 MG/1
5 TABLET ORAL DAILY
Qty: 45 TABLET | Refills: 1 | Status: SHIPPED | OUTPATIENT
Start: 2025-05-22

## 2025-06-20 DIAGNOSIS — E66.9 OBESITY, UNSPECIFIED CLASS, UNSPECIFIED OBESITY TYPE, UNSPECIFIED WHETHER SERIOUS COMORBIDITY PRESENT: ICD-10-CM

## 2025-06-20 DIAGNOSIS — E66.9 OBESITY, UNSPECIFIED CLASS, UNSPECIFIED OBESITY TYPE, UNSPECIFIED WHETHER SERIOUS COMORBIDITY PRESENT: Primary | ICD-10-CM

## 2025-06-20 RX ORDER — SEMAGLUTIDE 0.25 MG/.5ML
0.25 INJECTION, SOLUTION SUBCUTANEOUS WEEKLY
Qty: 2 ML | Refills: 0 | Status: SHIPPED | OUTPATIENT
Start: 2025-06-20 | End: 2025-06-20 | Stop reason: SDUPTHER

## 2025-06-20 RX ORDER — SEMAGLUTIDE 0.25 MG/.5ML
0.25 INJECTION, SOLUTION SUBCUTANEOUS WEEKLY
Qty: 2 ML | Refills: 0 | Status: SHIPPED | OUTPATIENT
Start: 2025-06-20

## 2025-07-22 DIAGNOSIS — E78.5 HYPERLIPIDEMIA, UNSPECIFIED HYPERLIPIDEMIA TYPE: Primary | ICD-10-CM

## 2025-08-16 DIAGNOSIS — E78.5 HYPERLIPIDEMIA, UNSPECIFIED HYPERLIPIDEMIA TYPE: ICD-10-CM

## 2025-08-18 RX ORDER — PRAVASTATIN SODIUM 40 MG
40 TABLET ORAL DAILY
Qty: 90 TABLET | Refills: 1 | OUTPATIENT
Start: 2025-08-18

## 2025-08-21 DIAGNOSIS — E78.5 HYPERLIPIDEMIA, UNSPECIFIED HYPERLIPIDEMIA TYPE: ICD-10-CM

## 2025-08-21 RX ORDER — PRAVASTATIN SODIUM 80 MG/1
80 TABLET ORAL DAILY
Qty: 90 TABLET | Refills: 1 | Status: SHIPPED | OUTPATIENT
Start: 2025-08-21